# Patient Record
Sex: MALE | Race: WHITE | Employment: FULL TIME | URBAN - METROPOLITAN AREA
[De-identification: names, ages, dates, MRNs, and addresses within clinical notes are randomized per-mention and may not be internally consistent; named-entity substitution may affect disease eponyms.]

---

## 2017-02-09 ENCOUNTER — OFFICE VISIT (OUTPATIENT)
Dept: FAMILY MEDICINE | Facility: CLINIC | Age: 47
End: 2017-02-09
Payer: COMMERCIAL

## 2017-02-09 ENCOUNTER — TELEPHONE (OUTPATIENT)
Dept: FAMILY MEDICINE | Facility: CLINIC | Age: 47
End: 2017-02-09

## 2017-02-09 VITALS — DIASTOLIC BLOOD PRESSURE: 81 MMHG | HEART RATE: 83 BPM | SYSTOLIC BLOOD PRESSURE: 137 MMHG

## 2017-02-09 DIAGNOSIS — F41.1 GENERALIZED ANXIETY DISORDER: ICD-10-CM

## 2017-02-09 DIAGNOSIS — M62.831 CHARLEYHORSE: Primary | ICD-10-CM

## 2017-02-09 DIAGNOSIS — R79.89 ELEVATED FERRITIN: ICD-10-CM

## 2017-02-09 DIAGNOSIS — G25.81 RESTLESS LEG SYNDROME: ICD-10-CM

## 2017-02-09 DIAGNOSIS — Z78.9 HISTORY OF CLENCHING OF MANDIBLE: ICD-10-CM

## 2017-02-09 DIAGNOSIS — R07.89 ATYPICAL CHEST PAIN: ICD-10-CM

## 2017-02-09 LAB
ANION GAP SERPL CALCULATED.3IONS-SCNC: 9 MMOL/L (ref 3–14)
BUN SERPL-MCNC: 17 MG/DL (ref 7–30)
CALCIUM SERPL-MCNC: 8.7 MG/DL (ref 8.5–10.1)
CHLORIDE SERPL-SCNC: 105 MMOL/L (ref 94–109)
CO2 SERPL-SCNC: 23 MMOL/L (ref 20–32)
CREAT SERPL-MCNC: 0.74 MG/DL (ref 0.66–1.25)
FERRITIN SERPL-MCNC: 664 NG/ML (ref 26–388)
GFR SERPL CREATININE-BSD FRML MDRD: ABNORMAL ML/MIN/1.7M2
GLUCOSE SERPL-MCNC: 123 MG/DL (ref 70–99)
MAGNESIUM SERPL-MCNC: 2.2 MG/DL (ref 1.6–2.3)
POTASSIUM SERPL-SCNC: 3.9 MMOL/L (ref 3.4–5.3)
SODIUM SERPL-SCNC: 137 MMOL/L (ref 133–144)

## 2017-02-09 PROCEDURE — 80048 BASIC METABOLIC PNL TOTAL CA: CPT | Performed by: FAMILY MEDICINE

## 2017-02-09 PROCEDURE — 83550 IRON BINDING TEST: CPT | Performed by: FAMILY MEDICINE

## 2017-02-09 PROCEDURE — 36415 COLL VENOUS BLD VENIPUNCTURE: CPT | Performed by: FAMILY MEDICINE

## 2017-02-09 PROCEDURE — 83540 ASSAY OF IRON: CPT | Performed by: FAMILY MEDICINE

## 2017-02-09 PROCEDURE — 83735 ASSAY OF MAGNESIUM: CPT | Performed by: FAMILY MEDICINE

## 2017-02-09 PROCEDURE — 82728 ASSAY OF FERRITIN: CPT | Performed by: FAMILY MEDICINE

## 2017-02-09 PROCEDURE — 99214 OFFICE O/P EST MOD 30 MIN: CPT | Performed by: FAMILY MEDICINE

## 2017-02-09 RX ORDER — CLONAZEPAM 1 MG/1
TABLET ORAL
Qty: 60 TABLET | Refills: 0 | Status: SHIPPED | OUTPATIENT
Start: 2017-02-09 | End: 2017-06-02

## 2017-02-09 ASSESSMENT — ANXIETY QUESTIONNAIRES
7. FEELING AFRAID AS IF SOMETHING AWFUL MIGHT HAPPEN: SEVERAL DAYS
3. WORRYING TOO MUCH ABOUT DIFFERENT THINGS: NOT AT ALL
GAD7 TOTAL SCORE: 8
2. NOT BEING ABLE TO STOP OR CONTROL WORRYING: SEVERAL DAYS
1. FEELING NERVOUS, ANXIOUS, OR ON EDGE: SEVERAL DAYS
5. BEING SO RESTLESS THAT IT IS HARD TO SIT STILL: MORE THAN HALF THE DAYS
IF YOU CHECKED OFF ANY PROBLEMS ON THIS QUESTIONNAIRE, HOW DIFFICULT HAVE THESE PROBLEMS MADE IT FOR YOU TO DO YOUR WORK, TAKE CARE OF THINGS AT HOME, OR GET ALONG WITH OTHER PEOPLE: SOMEWHAT DIFFICULT
6. BECOMING EASILY ANNOYED OR IRRITABLE: SEVERAL DAYS

## 2017-02-09 ASSESSMENT — PATIENT HEALTH QUESTIONNAIRE - PHQ9: 5. POOR APPETITE OR OVEREATING: MORE THAN HALF THE DAYS

## 2017-02-09 NOTE — NURSING NOTE
"Chief Complaint   Patient presents with     Chest Pain     chest pain and SOB ongoing for a couple weeks. has been seen twice for this. He also states he has tingling and burning in hands and weakness/fatigue over entire body with occasional lightheadedness upon standing.        Initial /81 mmHg  Pulse 83 Estimated body mass index is 36.39 kg/(m^2) as calculated from the following:    Height as of 11/18/16: 5' 11\" (1.803 m).    Weight as of 12/30/16: 260 lb 12.8 oz (118.298 kg).  Medication Reconciliation: complete   Nadya Campos CMA    "

## 2017-02-09 NOTE — MR AVS SNAPSHOT
"              After Visit Summary   2/9/2017    Phi Ortiz Jr.    MRN: [id number]           Patient Information     Date Of Birth          1970        Visit Information        Provider Department      2/9/2017 2:00 PM Cayla Betancourt MD [facility] [place]        Today's Diagnoses     [place]    -  1     Restless leg syndrome         History of clenching of mandible         Atypical chest pain         Generalized anxiety disorder           Care Instructions    Try the clonazepam 1/2 to 1 tablet  (o.5 to 1 mg) at night for a week or ten days to see if this helps your restless legs and cramps at night. If it does, then you may add 1/2 to 1 tablet in the AM to reduce the jaw clenching. Recheck with your regular nurse practitioner or physician in a month or so.        Follow-ups after your visit        Who to contact     If you have questions or need follow up information about today's clinic visit or your schedule please contact [facility] directly at [phone].  Normal or non-critical lab and imaging results will be communicated to you by [employer], letter or phone within 4 business days after the clinic has received the results. If you do not hear from us within 7 days, please contact the clinic through [employer] or phone. If you have a critical or abnormal lab result, we will notify you by phone as soon as possible.  Submit refill requests through [employer] or call your pharmacy and they will forward the refill request to us. Please allow 3 business days for your refill to be completed.          Additional Information About Your Visit        [employer] Information     Hub[employer] lets you send messages to your doctor, view your test results, renew your prescriptions, schedule appointments and more. To sign up, go to [email or web address] . Click on \"Log in\" on the left side of the screen, which will take you to the Welcome page. Then click on \"Sign up Now\" on the right side " of the page.     You will be asked to enter the access code listed below, as well as some personal information. Please follow the directions to create your username and password.     Your access code is: [email or web address]  Expires: 2017  1:14 PM     Your access code will  in 90 days. If you need help or a new code, please call your [place] clinic or [phone].        Care EveryWhere ID     This is your Care EveryWhere ID. This could be used by other organizations to access your [place] medical records  [id number]        Your Vitals Were     Pulse                   83            Blood Pressure from Last 3 Encounters:   17 137/81   16 124/84   16 125/73    Weight from Last 3 Encounters:   16 260 lb 12.8 oz (118.298 kg)   16 261 lb (118.389 kg)   10/06/16 259 lb (117.482 kg)              We Performed the Following     Basic metabolic panel     Ferritin     Magnesium          Today's Medication Changes          These changes are accurate as of: 17  3:08 PM.  If you have any questions, ask your nurse or doctor.               Start taking these medicines.        Dose/Directions    clonazePAM 1 MG tablet   Commonly known as:  klonoPIN   Used for:  Restless leg syndrome, History of clenching of mandible, Generalized anxiety disorder   Started by:  Cayla Betancourt MD        Take 1/2 - 1 tablet at bedtime.  If cramps and restless legs improve at night, may increase to BID dosing prn.   Quantity:  60 tablet   Refills:  0            Where to get your medicines      Some of these will need a paper prescription and others can be bought over the counter.  Ask your nurse if you have questions.     Bring a paper prescription for each of these medications    - clonazePAM 1 MG tablet             Primary Care Provider Office Phone # Fax #    Genet Ortiz [phone]       [facility] [address] [facility] [address]        Thank you!     Thank  you for choosing [facility]  for your care. Our goal is always to provide you with excellent care. Hearing back from our patients is one way we can continue to improve our services. Please take a few minutes to complete the written survey that you may receive in the mail after your visit with us. Thank you!             Your Updated Medication List - Protect others around you: Learn how to safely use, store and throw away your medicines at www.disposemymeds.org.          This list is accurate as of: 2/9/17  3:08 PM.  Always use your most recent med list.                   Brand Name Dispense Instructions for use    albuterol 108 (90 BASE) MCG/ACT Inhaler    PROAIR HFA/PROVENTIL HFA/VENTOLIN HFA    1 Inhaler    Inhale 2 puffs into the lungs every 6 hours as needed for shortness of breath / dyspnea or wheezing       aspirin 81 MG EC tablet     90 tablet    Take 325 mg by mouth daily       blood glucose lancing device     30 each    Use to test blood sugars 1 times daily or as directed.       blood glucose monitoring meter device kit     1 kit    Use to test blood sugars 1 times daily or as directed.       blood glucose monitoring test strip    ONE TOUCH ULTRA    1 Box    Use to test blood sugars 1 times daily or as directed.       citalopram 40 MG tablet    celeXA    30 tablet    Take 1 tablet (40 mg) by mouth daily       clonazePAM 1 MG tablet    klonoPIN    60 tablet    Take 1/2 - 1 tablet at bedtime.  If cramps and restless legs improve at night, may increase to BID dosing prn.       lisinopril 2.5 MG tablet    PRINIVIL/Zestril    90 tablet    Take 1 tablet (2.5 mg) by mouth daily       metFORMIN 500 MG 24 hr tablet    GLUCOPHAGE-XR    180 tablet    Take 1 tablet (500 mg) by mouth 2 times daily (with meals)       omeprazole 40 MG capsule    priLOSEC    90 capsule    Take 1 capsule (40 mg) by mouth daily Take 30-60 minutes before a meal.       order for PATRICIA Yip was set up on [employer]  [employer]  Type REMstar Auto (System One 60 series)  Serial Number: [id number] Modem Number: [id number] Pressure: AUTO-TITRATE CPAP 7-12 cm H20 Mask of choice: AIRFIT F 10 FULL FACE MASK Size: MEDIUM HEATED TUBING AND MODEM PROVIDED       rosuvastatin 40 MG tablet    CRESTOR    90 tablet    Take 1 tablet (40 mg) by mouth daily

## 2017-02-09 NOTE — PROGRESS NOTES
SUBJECTIVE:                                                    Phi Ortiz Jr. is a 46 year old male who presents to clinic today for the following health issues:    Chief Complaint   Patient presents with     Chest Pain     chest pain and SOB ongoing for a couple weeks. has been seen twice for this and inhaler hasnt helped. He also states he has tingling and burning in hands, weakness/fatigue over entire body with occasional lightheadedness upon standing. He also added that he has been grinding his teeth and clenching his jaw lately.     Phi Ortiz Jr. is a 46 year old male with a history of RITA, mild depression, diabetes, GERD, hypertension, obstructive sleep apnea (now on CPAP), and non-obstructive coronary artery disease.  He has had intermittent episodes or periods of atypical chest pains, was last seen for this in the ED with a negative evaluation.  He does see a cardiologist, last had an angiogram in 2014. He presents now with complaints for two weeks of some focal chest discomfort which he can point to, over his lower sternum, left upper pectoral region, and left posterior shoulder. THis has not affected normal daily activitites.  He sometimes feels short of breath, but was seen somewhere else for this and given an inhaler which didn't help.   Additional complaints include periods of tingling in his hands. Once when he was driving, this sensation was accompanied by a burning feeling.  He notes Charley horses in his thighs and calves from time to time, mostly at night. He notes a need to move his legs, and even if sitting for a long period, will start to tap his feet or shuffle them.  He has a history of snoring, which apparently is still an issue despite his use of CPAP. He works a swing shift, which is difficult for him, but feels he gets eight hours of good quality sleep a night..  He notes that he has been clenching his jaws more recently, but denies dental issues. He is also not clear  whether this is happening more during the day or in sleep.    He does not think he is more anxious than previously, but admits to being more crabby.  There have been some changes in his life. His daughter had been living with her  significant other in [place], but the couple is now moving to [place], and she was just recently home for a visit.  His 18 year old son is graduating this year and will be attending college at [employer]. He is concerned about his son, who he feels has a more significant anxiety disorder than the patient.    Current Outpatient Prescriptions   Medication Sig     metFORMIN (GLUCOPHAGE-XR) 500 MG 24 hr tablet Take 1 tablet (500 mg) by mouth 2 times daily (with meals)     citalopram (CELEXA) 40 MG tablet Take 1 tablet (40 mg) by mouth daily     rosuvastatin (CRESTOR) 40 MG tablet Take 1 tablet (40 mg) by mouth daily     blood glucose monitoring (ONE TOUCH ULTRA 2) meter device kit Use to test blood sugars 1 times daily or as directed.     blood glucose (ONE TOUCH DELICA) lancing device Use to test blood sugars 1 times daily or as directed.     blood glucose monitoring (ONE TOUCH ULTRA) test strip Use to test blood sugars 1 times daily or as directed.     lisinopril (PRINIVIL,ZESTRIL) 2.5 MG tablet Take 1 tablet (2.5 mg) by mouth daily     ORDER FOR DME, SET TO Phi BYERS was set up on [employer]  Type REMstar Auto (System One 60 series)  Serial Number: [id number] Modem Number: [id number] Pressure: AUTO-TITRATE CPAP 7-12 cm H20 Mask of choice: AIRFIT F 10 FULL FACE MASK Size: MEDIUM HEATED TUBING AND MODEM PROVIDED     aspirin 81 MG EC tablet Take 4 tablets (325 mg) by mouth daily     omeprazole (PRILOSEC) 40 MG capsule Take 1 capsule (40 mg) by mouth daily Take 30-60 minutes before a meal.     albuterol (PROAIR HFA, PROVENTIL HFA, VENTOLIN HFA) 108 (90 BASE) MCG/ACT inhaler Inhale 2 puffs into the lungs every 6 hours as needed for shortness of breath / dyspnea or  wheezing           OBJECTIVE: /81 mmHg  Pulse 83  Recent recorded weigh: 260#    This is an obese pleasant gentleman in no apparent distress.  Ears are clear; TMs show no fluid or erythema.  Nose is unremarkable.  Throat is clear.  Neck is without adenopathy.  There is no tenderness over the TMJs. Dentition appears grossly normal.  The lungs are clear without wheezes, rales, or rhonchi.  Heart sounds are regular without murmur.  The abdomen is benign.  There is no edema.    Recent labs included the following:    Component      Latest Ref Rng 10/6/2016   Sodium      133 - 144 mmol/L 138   Potassium      3.4 - 5.3 mmol/L 3.9   Chloride      94 - 109 mmol/L 105   Carbon Dioxide      20 - 32 mmol/L 24   Anion Gap      3 - 14 mmol/L 9   Glucose      70 - 99 mg/dL 124 (H)   Urea Nitrogen      7 - 30 mg/dL 11   Creatinine      0.66 - 1.25 mg/dL 0.62 (L)   GFR Estimate      >60 mL/min/1.7m2 >90 . . .   GFR Estimate If Black      >60 mL/min/1.7m2 >90 . . .   Calcium      8.5 - 10.1 mg/dL 8.7   Hemoglobin A1C      4.3 - 6.0 % 6.1 (H)   Vitamin B12      193 - 986 pg/mL 860   TSH      0.40 - 4.00 mU/L 1.17       RITA-7   [employer], 2002; Used with Permission) 10/6/2016   RITA-7 Total Score =     1. Feeling nervous, anxious, or on edge 2   2. Not being able to stop or control worrying 3   3. Worrying too much about different things 3   4. Trouble relaxing 3   5. Being so restless that it is hard to sit still 3   6. Becoming easily annoyed or irritable 3   7. Feeling afraid, as if something awful might happen 1   RITA-7 Total Score 18   If you checked any problems, how difficult have they made it for you to do your work, take care of things at home, or get along with other people?      RITA-7   [employer], 2002; Used with Permission) 2/9/2017   RITA-7 Total Score =     1. Feeling nervous, anxious, or on edge 1   2. Not being able to stop or control worrying 1   3. Worrying too much about different things 0   4. Trouble  relaxing 2   5. Being so restless that it is hard to sit still 2   6. Becoming easily annoyed or irritable 1   7. Feeling afraid, as if something awful might happen 1   RITA-7 Total Score 8   If you checked any problems, how difficult have they made it for you to do your work, take care of things at home, or get along with other people? Somewhat difficult     ASSESSMENT:    1) atypical chest pain with jaw clenching and teeth grinding -- in the absence of other apparent etiologies, I suspect this is a reflection of his underlying anxiety, despite the fact that he is giving himself lower scores on his RITA-7 than previously. He is already on a maximum dose of citalopram.  2)lower extremity muscle cramps and probably restless legs    PLAN: We will check a BMP, magnesium level, and ferritin, as low iron could contribute to restless legs.   Rx clonazepam to assist both with anxiety and muscle tension and restless legs.  Start with 0.5 - 1 mg at bedtime for a week or so, and if nighttime symptoms improve, then may add 0.5-1 mg in the AM to help with jaw clenching.   He is normally followed at [facility] and will recheck with his PCP regarding whether the Klonopin is of benefit.    Cayla Betancourt md

## 2017-02-09 NOTE — PATIENT INSTRUCTIONS
Try the clonazepam 1/2 to 1 tablet  (o.5 to 1 mg) at night for a week or ten days to see if this helps your restless legs and cramps at night. If it does, then you may add 1/2 to 1 tablet in the AM to reduce the jaw clenching. Recheck with your regular nurse practitioner or physician in a month or so.

## 2017-02-09 NOTE — Clinical Note
[facility]  [address] [facility] [address]  Phone: [phone]    February 10, 2017    Phi Ortiz Jr.  [address] [facility] [address]          Dear Phi,    The results of your recent lab tests were The ferritin, which measures stored iron, was high, but your iron levels are normal. Ferritin can also be increased by obesity and diabetes, and the primary reason we had checked this was to make sure he was not too low on iron, since that could account for some of your restless legs. But the iron is fine.  Magnesium and all the other electrolytes and kidney functions are fine too. Enclosed is a copy of these results.  If you have any further questions or problems, please contact our office.  Results for orders placed or performed in visit on 02/09/17   Ferritin   Result Value Ref Range    Ferritin 664 (H) 26 - 388 ng/mL   Magnesium   Result Value Ref Range    Magnesium 2.2 1.6 - 2.3 mg/dL   Basic metabolic panel   Result Value Ref Range    Sodium 137 133 - 144 mmol/L    Potassium 3.9 3.4 - 5.3 mmol/L    Chloride 105 94 - 109 mmol/L    Carbon Dioxide 23 20 - 32 mmol/L    Anion Gap 9 3 - 14 mmol/L    Glucose 123 (H) 70 - 99 mg/dL    Urea Nitrogen 17 7 - 30 mg/dL    Creatinine 0.74 0.66 - 1.25 mg/dL    GFR Estimate >90  Non  GFR Calc   >60 mL/min/1.7m2    GFR Estimate If Black >90   GFR Calc   >60 mL/min/1.7m2    Calcium 8.7 8.5 - 10.1 mg/dL   Iron and iron binding capacity   Result Value Ref Range    Iron 95 35 - 180 ug/dL    Iron Binding Cap 299 240 - 430 ug/dL    Iron Saturation Index 32 15 - 46 %     Sincerely,      Cayla Betancourt MD/ llc

## 2017-02-09 NOTE — TELEPHONE ENCOUNTER
Dr. Betancourt is seeing a pt at 2pm today that normally sees Rad[facility]. It is for chest pain with sob, and weakness. I think they wanted him to talk to the care team but he ended up being transferred to [facility] . Can someone call him but he is at work.

## 2017-02-09 NOTE — TELEPHONE ENCOUNTER
Spoke with pt who reports onset recurrent irritating pains lt collar bone area 2 wks ago.   States pain/ irritation fairly constant 3/10 with periodic brief sharp pains.   Pain worsened with any activity involving arm muscle use.  Also reports noting some mild exertional SOA, fatigue.  Denies fevers, chills.   In addition, reports burning palms of hands 2 wks ago, no sx currently.  Reports current URI/ sinus sx so not sure if has anything to do with how he is feeling.   Has seen card in past 04-26-16-     ASSESSMENT AND PLAN:    Mr Ortiz is a 46 yo male with non-obstructive coronary artery disease, hypercholesterolemia, strong family history of premature CAD and obesity who is here for a follow up visit.  --LDL was 86, will increase crestor from 20 mg daily to 40 mg daily  --Continue ASA 81 mg daily.    --Given non-obstructive CAD and his risk profile we'll continue with risk factor modification which includes weight loss, regular exercise, low salt, low fat, low caloric diet with portion control. Applauded on 25 pound weight loss over the last one year.      Hx similar sx in past., eval in ED 08-17-16-      Assessments & Plan (with Medical Decision Making) labs EKG and chest x-ray were obtained.  EKG was within normal limits.  Unchanged from previous.  CBC and basic metabolic panel within normal limits.  BNP was not elevated.  Initial troponin was negative.  Patient had been initially given aspirin and later was given Toradol with significant improvement of his pain.  Findings were discussed with patient.  He is having minimal pain at this time.  It is only present with movement.  A repeat troponin was obtained roughly 3 hours from previous and was also negative.  He was fed and ambulated and denied any pain at this time.  I discussed that with his significant risk factors I felt admission for continued rule out and possible stress testing would be the safest thing for the patient.  Patient understands his risks  but does not think this is cardiac in nature he feels more comfortable following up with his cardiologist in the morning.  He is strongly advised to return if any return of chest pain.  He understands I cannot completely rule out a cardiac nature to this chest pain.  I considered numerous causes of chest pain including: MI, ACS, pneumonia, pneumothorax, pulmonary embolism, pericarditis, pleurisy, myocarditis, musculoskeletal, GERD, PUD etc..             Due to nature of sx, hx similar sx advised ED however pt insists sx are not cardiac related and will keep appt with Dr. Betancourt today.   KAYLEIGH Elizabeth RN

## 2017-02-10 LAB
IRON SATN MFR SERPL: 32 % (ref 15–46)
IRON SERPL-MCNC: 95 UG/DL (ref 35–180)
TIBC SERPL-MCNC: 299 UG/DL (ref 240–430)

## 2017-02-10 ASSESSMENT — ANXIETY QUESTIONNAIRES: GAD7 TOTAL SCORE: 8

## 2017-02-13 ENCOUNTER — TELEPHONE (OUTPATIENT)
Dept: FAMILY MEDICINE | Facility: CLINIC | Age: 47
End: 2017-02-13

## 2017-02-13 NOTE — TELEPHONE ENCOUNTER
Reason for Call:  Other call back    Detailed comments: Patient states he received a box of catheters and has no idea what to do with them.    Phone Number Patient can be reached at: Home number on file [phone] (home)    Best Time: any    Can we leave a detailed message on this number? YES    Call taken on 2/13/2017 at 8:57 AM by Cayla Green

## 2017-02-14 NOTE — TELEPHONE ENCOUNTER
I spoke with pt.    He clarifies that he received a box of catheters in the mail from [place].  Pt does not know why he received these.  There was no packing slip with the package.    Pt will bring the box in and meet with the RN at his next opportunity for assistance to determine if this is something he is supposed to be using vs if the package was received by error.  Thi Venegas, RN

## 2017-03-16 ENCOUNTER — OFFICE VISIT (OUTPATIENT)
Dept: FAMILY MEDICINE | Facility: CLINIC | Age: 47
End: 2017-03-16
Payer: COMMERCIAL

## 2017-03-16 VITALS
HEIGHT: 71 IN | OXYGEN SATURATION: 96 % | WEIGHT: 266 LBS | HEART RATE: 79 BPM | DIASTOLIC BLOOD PRESSURE: 76 MMHG | BODY MASS INDEX: 37.24 KG/M2 | RESPIRATION RATE: 20 BRPM | SYSTOLIC BLOOD PRESSURE: 128 MMHG | TEMPERATURE: 98.7 F

## 2017-03-16 DIAGNOSIS — K21.9 GASTROESOPHAGEAL REFLUX DISEASE WITHOUT ESOPHAGITIS: Primary | ICD-10-CM

## 2017-03-16 DIAGNOSIS — R10.13 EPIGASTRIC PAIN: ICD-10-CM

## 2017-03-16 PROCEDURE — 99214 OFFICE O/P EST MOD 30 MIN: CPT | Performed by: NURSE PRACTITIONER

## 2017-03-16 NOTE — PATIENT INSTRUCTIONS
[place] diet, rest, fluids as tolerated.     Use Medication as directed    Follow up with PCP in 2 weeks.    Go to Emergency Room if sx worsen or change.   May use acetaminophen, ibuprofen prn.    Patient voiced understanding of instructions given.        Follow-up with your primary care provider next week and as needed.    Indications for emergent return to emergency department discussed with patient, who verbalized good understanding and agreement.  Patient understands the limitations of today's evaluation.         Medications for Acid Reflux  Your health care provider has told you that you have acid reflux. This is a condition that causes stomach acid to wash up into your throat. For most people, acid reflux is troubling but not dangerous. However, left untreated, acid reflux sometimes damages the esophagus. Medications can help control acid reflux and limit your risk of future problems.  Medications for Acid Reflux  Your health care provider may prescribe medication to help treat your acid reflux. Medication will be based on your symptoms and the results of any tests. Your health care provider will explain how to take your medication. You will also be told about possible side effects.  Reducing Stomach Acid  Your doctor may suggest antacids that you can buy over the counter. Antacids can give fast relief. Or you may be told to take a type of medication called H2 blockers. These are available over the counter and by prescription (for higher doses).  Blocking Stomach Acid  In more severe cases, your doctor may suggest stronger medications such as proton pump inhibitors (PPIs). These keep the stomach from making acid. They are often prescribed for long-term use.  Other Medications  If medications to reduce or block stomach acid don t work, you may be switched to another type of medication that helps the stomach empty better.    0892-5708 [employer]. [address] [place] [address], [place] [address]. All  rights reserved. This information is not intended as a substitute for professional medical care. Always follow your healthcare professional's instructions.        Tips to Control Acid Reflux  To control acid reflux, you ll need to make some basic diet and lifestyle changes. The simple steps outlined below may be all you ll need to relieve discomfort.  Watch What You Eat      Avoid fatty foods and spicy foods.    Eat fewer acidic foods, such as citrus and tomato-based foods. These can increase symptoms.    Limit drinking alcohol, caffeine, and fizzy beverages. All increase acid reflux.    Try limiting chocolate, peppermint, and spearmint. These can worsen acid reflux in some people.  Watch When You Eat    Avoid lying down for 3 hours after eating.    Do not snack before going to bed.  Raise Your Head    Raising your head and upper body by 4 inches to 6 inches helps limit reflux when you re lying down. Put blocks under the head of the bed frame to raise it.  Other Changes    Lose weight, if you need to.    Don t work out near bedtime.    Avoid tight-fitting clothes.    Limit aspirin and ibuprofen.    Stop smoking.     5998-8407 [employer]. [address] [place] [address]. All rights reserved. This information is not intended as a substitute for professional medical care. Always follow your healthcare professional's instructions.        GERD (Adult)    The esophagus is a tube that carries food from the mouth to the stomach. A valve at the lower end of the esophagus prevents stomach acid from flowing upward. When this valve doesn't work properly, stomach contents may repeatedly flow back up (reflux) into the esophagus. This is called gastroesophageal reflux disease (GERD). GERD can irritate the esophagus. It can cause problems with swallowing or breathing. In severe cases, GERD can cause recurrent pneumonia or other serious problems.  Symptoms of reflux include burning, pressure or sharp pain in  "the upper abdomen or mid to lower chest. The pain can spread to the neck, back, or shoulder. There may be belching, an acid taste in the back of the throat, chronic cough, or sore throat or hoarseness. GERD symptoms often occur during the day after a big meal. They can also occur at night when lying down.   Home care  Lifestyle changes can help reduce symptoms. If needed, medicines may be prescribed. Symptoms often improve with treatment, but if treatment is stopped, the symptoms often return after a few months. So most persons with GERD will need to continue treatment.  Lifestyle changes    Limit or avoid fatty, fried, and spicy foods, as well as coffee, chocolate, mint, and foods with high acid content such as tomatoes and citrus fruit and juices (orange, grapefruit, lemon).    Don t eat large meals, especially at night. Frequent, smaller meals are best. Do not lie down right after eating. And don t eat anything 3 hours before going to bed.    Avoid drinking alcohol and smoking. As much as possible, stay away from second hand smoke.    If you are overweight, losing weight will reduce symptoms.     Avoid wearing tight clothing around your stomach area.    If your symptoms occur during sleep, use a foam wedge to elevate your upper body (not just your head.) Or, place 4\" blocks under the head of your bed.  Medicines  If needed, medicines can help relieve the symptoms of GERD and prevent damage to the esophagus. Discuss a medicine plan with your healthcare provider. This may include one or more of the following medicines:    Antacids to help neutralize the normal acids in your stomach.    Acid blockers (H2 blockers) to decrease acid production.    Acid inhibitors (PPIs) to decrease acid production in a different way than the blockers. They may work better, but can take a little longer to take effect.  Take an antacid 30-60 minutes after eating and at bedtime, but not at the same time as an acid blocker.  Try not to " take medicines such as ibuprofen and aspirin. If you are taking aspirin for your heart or other medical reasons, talk to your healthcare provider about stopping it.  Follow-up care  Follow up with your healthcare provider or as advised by our staff.  When to seek medical advice  Call your healthcare provider if any of the following occur:    Stomach pain gets worse or moves to the lower right abdomen (appendix area)    Chest pain appears or gets worse, or spreads to the back, neck, shoulder, or arm    Frequent vomiting (can t keep down liquids)    Blood in the stool or vomit (red or black in color)    Feeling weak or dizzy    Fever of 100.4 F (38 C) or higher, or as directed by your healthcare provider    1869-2585 [employer]. [address] [place] [address], [place] [address]. All rights reserved. This information is not intended as a substitute for professional medical care. Always follow your healthcare professional's instructions.

## 2017-03-16 NOTE — LETTER
My Depression Action Plan  Name: Phi Ortiz Jr.   Date of Birth 1970  Date: 3/16/2017    My doctor: Genet Ortiz   My clinic: [address] [id number] [phone]          GREEN    ZONE   Good Control    What it looks like:     Things are going generally well. You have normal up s and down s. You may even feel depressed from time to time, but bad moods usually last less than a day.   What you need to do:  1. Continue to care for yourself (see self care plan)  2. Check your depression survival kit and update it as needed  3. Follow your physician s recommendations including any medication.  4. Do not stop taking medication unless you consult with your physician first.           YELLOW         ZONE Getting Worse    What it looks like:     Depression is starting to interfere with your life.     It may be hard to get out of bed; you may be starting to isolate yourself from others.    Symptoms of depression are starting to last most all day and this has happened for several days.     You may have suicidal thoughts but they are not constant.   What you need to do:     1. Call your care team, your response to treatment will improve if you keep your care team informed of your progress. Yellow periods are signs an adjustment may need to be made.     2. Continue your self-care, even if you have to fake it!    3. Talk to someone in your support network    4. Open up your depression survival kit           RED    ZONE Medical Alert - Get Help    What it looks like:     Depression is seriously interfering with your life.     You may experience these or other symptoms: You can t get out of bed most days, can t work or engage in other necessary activities, you have trouble taking care of basic hygiene, or basic responsibilities, thoughts of suicide or death that will not go away, self-injurious behavior.     What you need to do:  1. Call your care  team and request a same-day appointment. If they are not available (weekends or after hours) call your local crisis line, emergency room or 911.      Electronically signed by: Mariza Richardson, March 16, 2017    Depression Self Care Plan / Survival Kit    Self-Care for Depression  Here s the deal. Your body and mind are really not as separate as most people think.  What you do and think affects how you feel and how you feel influences what you do and think. This means if you do things that people who feel good do, it will help you feel better.  Sometimes this is all it takes.  There is also a place for medication and therapy depending on how severe your depression is, so be sure to consult with your medical provider and/ or Behavioral Health Consultant if your symptoms are worsening or not improving.     In order to better manage my stress, I will:    Exercise  Get some form of exercise, every day. This will help reduce pain and release endorphins, the  feel good  chemicals in your brain. This is almost as good as taking antidepressants!  This is not the same as joining a gym and then never going! (they count on that by the way ) It can be as simple as just going for a walk or doing some gardening, anything that will get you moving.      Hygiene   Maintain good hygiene (Get out of bed in the morning, Make your bed, Brush your teeth, Take a shower, and Get dressed like you were going to work, even if you are unemployed).  If your clothes don't fit try to get ones that do.    Diet  I will strive to eat foods that are good for me, drink plenty of water, and avoid excessive sugar, caffeine, alcohol, and other mood-altering substances.  Some foods that are helpful in depression are: complex carbohydrates, B vitamins, flaxseed, fish or fish oil, fresh fruits and vegetables.    Psychotherapy  I agree to participate in Individual Therapy (if recommended).    Medication  If prescribed medications, I agree to take them.   Missing doses can result in serious side effects.  I understand that drinking alcohol, or other illicit drug use, may cause potential side effects.  I will not stop my medication abruptly without first discussing it with my provider.    Staying Connected With Others  I will stay in touch with my friends, family members, and my primary care provider/team.    Use your imagination  Be creative.  We all have a creative side; it doesn t matter if it s oil painting, sand castles, or mud pies! This will also kick up the endorphins.    Witness Beauty  (AKA stop and smell the roses) Take a look outside, even in mid-winter. Notice colors, textures. Watch the squirrels and birds.     Service to others  Be of service to others.  There is always someone else in need.  By helping others we can  get out of ourselves  and remember the really important things.  This also provides opportunities for practicing all the other parts of the program.    Humor  Laugh and be silly!  Adjust your TV habits for less news and crime-drama and more comedy.    Control your stress  Try breathing deep, massage therapy, biofeedback, and meditation. Find time to relax each day.     My support system    Clinic Contact:  Phone number:    Contact 1:  Phone number:    Contact 2:  Phone number:    Worship/:  Phone number:    Therapist:  Phone number:    Local crisis center:    Phone number:    Other community support:  Phone number:

## 2017-03-16 NOTE — NURSING NOTE
"Chief Complaint   Patient presents with     Gastric Problem     heart burn       Initial /76  Pulse 79  Temp 98.7  F (37.1  C) (Tympanic)  Resp 20  Ht 5' 11\" (1.803 m)  Wt 266 lb (120.7 kg)  SpO2 96%  BMI 37.1 kg/m2 Estimated body mass index is 37.1 kg/(m^2) as calculated from the following:    Height as of this encounter: 5' 11\" (1.803 m).    Weight as of this encounter: 266 lb (120.7 kg).  Medication Reconciliation: complete     Encouraged microalbumin and lipid screen. Did dap today!  "

## 2017-03-16 NOTE — PROGRESS NOTES
SUBJECTIVE:                                                    Phi Ortiz Jr. is a 46 year old male who presents to clinic today for the following health issues:      GERD/Heartburn      Duration: today    Description (location/character/radiation): substernal, up into neck behind L ear    Intensity:  severe    Accompanying signs and symptoms:  food getting stuck: no   nausea/vomiting/blood: no   abdominal pain: no   black/tarry or bloody stools: no :    History (similar episodes/previous evaluation): None    Precipitating or alleviating factors none.  current NSAID/Aspirin use: YES    Therapies tried and outcome: Omeprazole (Prilosec)- currently taking omeprazole- has eaten bland diet today    Denies chest pain shortness of breath or other concerns      Problem list and histories reviewed & adjusted, as indicated.  Additional history: as documented    Patient Active Problem List   Diagnosis     GERD (gastroesophageal reflux disease)     Generalized anxiety disorder     Fatty liver     Family history of coronary artery disease     Hypertriglyceridemia     Vitamin D deficiency     Chest pain     Chronic ischemic heart disease     Hyperlipidemia LDL goal <70     Type 2 diabetes mellitus without complications (H)     Mild major depression (H)     overweight BMI greater than 35     Essential hypertension, benign     Past Surgical History   Procedure Laterality Date     No history of surgery         Social History   Substance Use Topics     Smoking status: Never Smoker     Smokeless tobacco: Former User     Alcohol use Yes      Comment: 6 pack a month     Family History   Problem Relation Age of Onset     C.A.D. Father      heart attack 30's     CEREBROVASCULAR DISEASE Father      DIABETES Father      Hypertension Father      Hypertension Mother      GASTROINTESTINAL DISEASE Mother      colitis     DIABETES Maternal Grandmother      snores     C.A.D. Maternal Grandfather      MI, leg circulation problems      WILLIAMASTEFANIA. Paternal Grandfather      snores     Neurologic Disorder Sister      vertigo.  fibromyalgia     Hyperlipidemia Daughter      Breast Cancer No family hx of      Cancer - colorectal No family hx of      Prostate Cancer No family hx of          Current Outpatient Prescriptions   Medication Sig Dispense Refill     aspirin 81 MG EC tablet Take 4 tablets (325 mg) by mouth daily 90 tablet 3     omeprazole (PRILOSEC) 40 MG capsule Take 1 capsule (40 mg) by mouth daily Take 30-60 minutes before a meal. 90 capsule 2     clonazePAM (KLONOPIN) 1 MG tablet Take 1/2 - 1 tablet at bedtime.  If cramps and restless legs improve at night, may increase to BID dosing prn. 60 tablet 0     metFORMIN (GLUCOPHAGE-XR) 500 MG 24 hr tablet Take 1 tablet (500 mg) by mouth 2 times daily (with meals) 180 tablet 0     citalopram (CELEXA) 40 MG tablet Take 1 tablet (40 mg) by mouth daily 30 tablet 3     albuterol (PROAIR HFA, PROVENTIL HFA, VENTOLIN HFA) 108 (90 BASE) MCG/ACT inhaler Inhale 2 puffs into the lungs every 6 hours as needed for shortness of breath / dyspnea or wheezing 1 Inhaler 0     rosuvastatin (CRESTOR) 40 MG tablet Take 1 tablet (40 mg) by mouth daily 90 tablet 3     blood glucose monitoring (ONE TOUCH ULTRA 2) meter device kit Use to test blood sugars 1 times daily or as directed. 1 kit 0     blood glucose (ONE TOUCH DELICA) lancing device Use to test blood sugars 1 times daily or as directed. 30 each 6     blood glucose monitoring (ONE TOUCH ULTRA) test strip Use to test blood sugars 1 times daily or as directed. 1 Box 6     lisinopril (PRINIVIL,ZESTRIL) 2.5 MG tablet Take 1 tablet (2.5 mg) by mouth daily 90 tablet 1     ORDER FOR PATRICIA, SET TO Phi BYERS was set up on V-Key  Type [employer] Auto (System One 60 series)  Serial Number: [id number] Modem Number: [id number] Pressure: AUTO-TITRATE CPAP 7-12 cm H20 Mask of choice: AIRFIT F 10 FULL FACE MASK Size: MEDIUM HEATED TUBING AND MODEM PROVIDED       No  "Known Allergies    Reviewed and updated as needed this visit by clinical staff  Tobacco  Allergies  Med Hx  Surg Hx  Fam Hx  Soc Hx      Reviewed and updated as needed this visit by Provider         ROS:  Constitutional, HEENT, cardiovascular, pulmonary, GI, , musculoskeletal, neuro, skin, endocrine and psych systems are negative, except as otherwise noted.    OBJECTIVE:                                                    /76  Pulse 79  Temp 98.7  F (37.1  C) (Tympanic)  Resp 20  Ht 5' 11\" (1.803 m)  Wt 266 lb (120.7 kg)  SpO2 96%  BMI 37.1 kg/m2  Body mass index is 37.1 kg/(m^2).   GENERAL: healthy, alert and no distress  EYES: Eyes grossly normal to inspection, PERRL and conjunctivae and sclerae normal  HENT: ear canals and TM's normal, nose and mouth without ulcers or lesions  NECK: no adenopathy, no asymmetry, masses, or scars and thyroid normal to palpation  RESP: lungs clear to auscultation - no rales, rhonchi or wheezes  CV: regular rate and rhythm, normal S1 S2, no S3 or S4, no murmur, click or rub, no peripheral edema and peripheral pulses strong  Abdomen: No rebound tenderness, Markle sign, Rovsing sign and obturator and psoas sign negative   Positive for epigastric tenderness.  MS: no gross musculoskeletal defects noted, no edema  SKIN: no suspicious lesions or rashes  NEURO: Normal strength and tone, mentation intact and speech normal  PSYCH: mentation appears normal, affect normal/bright         ASSESSMENT:                                                        ICD-10-CM    1. Gastroesophageal reflux disease without esophagitis K21.9 ranitidine (ZANTAC) 15 MG/ML syrup   2. Epigastric pain R10.13          PLAN:                                                    Patient given a GI coccktail with relief of his GERD  Will have him start on zantac one table at night in addition to his Prilosec.     Patient Instructions       Saline diet, rest, fluids as tolerated.     Use Medication as " directed    Follow up with PCP in 2 weeks.    Go to Emergency Room if sx worsen or change.   May use acetaminophen, ibuprofen prn.    Patient voiced understanding of instructions given.        Follow-up with your primary care provider next week and as needed.    Indications for emergent return to emergency department discussed with patient, who verbalized good understanding and agreement.  Patient understands the limitations of today's evaluation.         Medications for Acid Reflux  Your health care provider has told you that you have acid reflux. This is a condition that causes stomach acid to wash up into your throat. For most people, acid reflux is troubling but not dangerous. However, left untreated, acid reflux sometimes damages the esophagus. Medications can help control acid reflux and limit your risk of future problems.  Medications for Acid Reflux  Your health care provider may prescribe medication to help treat your acid reflux. Medication will be based on your symptoms and the results of any tests. Your health care provider will explain how to take your medication. You will also be told about possible side effects.  Reducing Stomach Acid  Your doctor may suggest antacids that you can buy over the counter. Antacids can give fast relief. Or you may be told to take a type of medication called H2 blockers. These are available over the counter and by prescription (for higher doses).  Blocking Stomach Acid  In more severe cases, your doctor may suggest stronger medications such as proton pump inhibitors (PPIs). These keep the stomach from making acid. They are often prescribed for long-term use.  Other Medications  If medications to reduce or block stomach acid don t work, you may be switched to another type of medication that helps the stomach empty better.    0156-6464 [employer]. [address] [place] [address], [place] [address]. All rights reserved. This information is not intended as a substitute  for professional medical care. Always follow your healthcare professional's instructions.        Tips to Control Acid Reflux  To control acid reflux, you ll need to make some basic diet and lifestyle changes. The simple steps outlined below may be all you ll need to relieve discomfort.  Watch What You Eat      Avoid fatty foods and spicy foods.    Eat fewer acidic foods, such as citrus and tomato-based foods. These can increase symptoms.    Limit drinking alcohol, caffeine, and fizzy beverages. All increase acid reflux.    Try limiting chocolate, peppermint, and spearmint. These can worsen acid reflux in some people.  Watch When You Eat    Avoid lying down for 3 hours after eating.    Do not snack before going to bed.  Raise Your Head    Raising your head and upper body by 4 inches to 6 inches helps limit reflux when you re lying down. Put blocks under the head of the bed frame to raise it.  Other Changes    Lose weight, if you need to.    Don t work out near bedtime.    Avoid tight-fitting clothes.    Limit aspirin and ibuprofen.    Stop smoking.     0761-4311 [employer]. [address] [place] [address]. All rights reserved. This information is not intended as a substitute for professional medical care. Always follow your healthcare professional's instructions.        GERD (Adult)    The esophagus is a tube that carries food from the mouth to the stomach. A valve at the lower end of the esophagus prevents stomach acid from flowing upward. When this valve doesn't work properly, stomach contents may repeatedly flow back up (reflux) into the esophagus. This is called gastroesophageal reflux disease (GERD). GERD can irritate the esophagus. It can cause problems with swallowing or breathing. In severe cases, GERD can cause recurrent pneumonia or other serious problems.  Symptoms of reflux include burning, pressure or sharp pain in the upper abdomen or mid to lower chest. The pain can spread to  "the neck, back, or shoulder. There may be belching, an acid taste in the back of the throat, chronic cough, or sore throat or hoarseness. GERD symptoms often occur during the day after a big meal. They can also occur at night when lying down.   Home care  Lifestyle changes can help reduce symptoms. If needed, medicines may be prescribed. Symptoms often improve with treatment, but if treatment is stopped, the symptoms often return after a few months. So most persons with GERD will need to continue treatment.  Lifestyle changes    Limit or avoid fatty, fried, and spicy foods, as well as coffee, chocolate, mint, and foods with high acid content such as tomatoes and citrus fruit and juices (orange, grapefruit, lemon).    Don t eat large meals, especially at night. Frequent, smaller meals are best. Do not lie down right after eating. And don t eat anything 3 hours before going to bed.    Avoid drinking alcohol and smoking. As much as possible, stay away from second hand smoke.    If you are overweight, losing weight will reduce symptoms.     Avoid wearing tight clothing around your stomach area.    If your symptoms occur during sleep, use a foam wedge to elevate your upper body (not just your head.) Or, place 4\" blocks under the head of your bed.  Medicines  If needed, medicines can help relieve the symptoms of GERD and prevent damage to the esophagus. Discuss a medicine plan with your healthcare provider. This may include one or more of the following medicines:    Antacids to help neutralize the normal acids in your stomach.    Acid blockers (H2 blockers) to decrease acid production.    Acid inhibitors (PPIs) to decrease acid production in a different way than the blockers. They may work better, but can take a little longer to take effect.  Take an antacid 30-60 minutes after eating and at bedtime, but not at the same time as an acid blocker.  Try not to take medicines such as ibuprofen and aspirin. If you are taking " aspirin for your heart or other medical reasons, talk to your healthcare provider about stopping it.  Follow-up care  Follow up with your healthcare provider or as advised by our staff.  When to seek medical advice  Call your healthcare provider if any of the following occur:    Stomach pain gets worse or moves to the lower right abdomen (appendix area)    Chest pain appears or gets worse, or spreads to the back, neck, shoulder, or arm    Frequent vomiting (can t keep down liquids)    Blood in the stool or vomit (red or black in color)    Feeling weak or dizzy    Fever of 100.4 F (38 C) or higher, or as directed by your healthcare provider    1107-2624 [employer]. [address] [place] [address], [place] [address]. All rights reserved. This information is not intended as a substitute for professional medical care. Always follow your healthcare professional's instructions.            MICHELLE Nassar [facility]

## 2017-03-17 ENCOUNTER — OFFICE VISIT (OUTPATIENT)
Dept: FAMILY MEDICINE | Facility: CLINIC | Age: 47
End: 2017-03-17
Payer: COMMERCIAL

## 2017-03-17 VITALS
HEIGHT: 71 IN | BODY MASS INDEX: 37.24 KG/M2 | RESPIRATION RATE: 16 BRPM | TEMPERATURE: 97.1 F | OXYGEN SATURATION: 97 % | WEIGHT: 266 LBS | SYSTOLIC BLOOD PRESSURE: 128 MMHG | HEART RATE: 75 BPM | DIASTOLIC BLOOD PRESSURE: 81 MMHG

## 2017-03-17 DIAGNOSIS — R07.89 ATYPICAL CHEST PAIN: Primary | ICD-10-CM

## 2017-03-17 DIAGNOSIS — K21.9 GASTROESOPHAGEAL REFLUX DISEASE WITHOUT ESOPHAGITIS: ICD-10-CM

## 2017-03-17 PROCEDURE — 93000 ELECTROCARDIOGRAM COMPLETE: CPT | Performed by: NURSE PRACTITIONER

## 2017-03-17 PROCEDURE — 99213 OFFICE O/P EST LOW 20 MIN: CPT | Performed by: NURSE PRACTITIONER

## 2017-03-17 RX ORDER — PANTOPRAZOLE SODIUM 40 MG/1
40 TABLET, DELAYED RELEASE ORAL DAILY
Qty: 30 TABLET | Refills: 1 | Status: SHIPPED | OUTPATIENT
Start: 2017-03-17 | End: 2017-06-19

## 2017-03-17 NOTE — MR AVS SNAPSHOT
After Visit Summary   3/17/2017    Phi Ortiz Jr.    MRN: [id number]           Patient Information     Date Of Birth          1970        Visit Information        Provider Department      3/17/2017 2:20 PM Peyton Mustafa APRN [facility]        Today's Diagnoses     Atypical chest pain    -  1    Gastroesophageal reflux disease without esophagitis          Care Instructions      GERD (Adult)    The esophagus is a tube that carries food from the mouth to the stomach. A valve at the lower end of the esophagus prevents stomach acid from flowing upward. When this valve doesn't work properly, stomach contents may repeatedly flow back up (reflux) into the esophagus. This is called gastroesophageal reflux disease (GERD). GERD can irritate the esophagus. It can cause problems with swallowing or breathing. In severe cases, GERD can cause recurrent pneumonia or other serious problems.  Symptoms of reflux include burning, pressure or sharp pain in the upper abdomen or mid to lower chest. The pain can spread to the neck, back, or shoulder. There may be belching, an acid taste in the back of the throat, chronic cough, or sore throat or hoarseness. GERD symptoms often occur during the day after a big meal. They can also occur at night when lying down.   Home care  Lifestyle changes can help reduce symptoms. If needed, medicines may be prescribed. Symptoms often improve with treatment, but if treatment is stopped, the symptoms often return after a few months. So most persons with GERD will need to continue treatment.  Lifestyle changes    Limit or avoid fatty, fried, and spicy foods, as well as coffee, chocolate, mint, and foods with high acid content such as tomatoes and citrus fruit and juices (orange, grapefruit, lemon).    Don t eat large meals, especially at night. Frequent, smaller meals are best. Do not lie down right after eating. And don t eat anything 3 hours before  "going to bed.    Avoid drinking alcohol and smoking. As much as possible, stay away from second hand smoke.    If you are overweight, losing weight will reduce symptoms.     Avoid wearing tight clothing around your stomach area.    If your symptoms occur during sleep, use a foam wedge to elevate your upper body (not just your head.) Or, place 4\" blocks under the head of your bed.  Medicines  If needed, medicines can help relieve the symptoms of GERD and prevent damage to the esophagus. Discuss a medicine plan with your healthcare provider. This may include one or more of the following medicines:    Antacids to help neutralize the normal acids in your stomach.    Acid blockers (H2 blockers) to decrease acid production.    Acid inhibitors (PPIs) to decrease acid production in a different way than the blockers. They may work better, but can take a little longer to take effect.  Take an antacid 30-60 minutes after eating and at bedtime, but not at the same time as an acid blocker.  Try not to take medicines such as ibuprofen and aspirin. If you are taking aspirin for your heart or other medical reasons, talk to your healthcare provider about stopping it.  Follow-up care  Follow up with your healthcare provider or as advised by our staff.  When to seek medical advice  Call your healthcare provider if any of the following occur:    Stomach pain gets worse or moves to the lower right abdomen (appendix area)    Chest pain appears or gets worse, or spreads to the back, neck, shoulder, or arm    Frequent vomiting (can t keep down liquids)    Blood in the stool or vomit (red or black in color)    Feeling weak or dizzy    Fever of 100.4 F (38 C) or higher, or as directed by your healthcare provider    2679-0072 [employer]. [address] [place] [address], [place] [address]. All rights reserved. This information is not intended as a substitute for professional medical care. Always follow your healthcare professional's " instructions.        Lifestyle Changes for Controlling GERD  When you have GERD, stomach acid feels as if it s backing up toward your mouth. Whether or not you take medication to control your GERD, your symptoms can often be improved with lifestyle changes. Talk to your doctor about the following suggestions, which may help you get relief from your symptoms.  Raise Your Head    Reflux is more likely to strike when you re lying down flat, because stomach fluid can flow backward more easily. Raising the head of your bed 4 to 6 inches can help. To do this:    Slide blocks or books under the legs at the head of your bed. Or, place a wedge under the mattress. [employer] can make a suitable wedge for you. The wedge should run from your waist to the top of your head.    Don t just prop your head on several pillows. This increases pressure on your stomach. It can make GERD worse.  Watch Your Eating Habits  Certain foods may increase the acid in your stomach or relax the lower esophageal sphincter, making GERD more likely. It s best to avoid the following:    Coffee, tea, and carbonated drinks (with and without caffeine)    Fatty, fried, or spicy food    Mint, chocolate, onions, and tomatoes    Any other foods that seem to irritate your stomach or cause you pain  Relieve the Pressure    Eat smaller meals, even if you have to eat more often.    Don t lie down right after you eat. Wait a few hours for your stomach to empty.    Avoid tight belts and tight-fitting clothes.    Lose excess weight.  Tobacco and Alcohol  Avoid smoking tobacco and drinking alcohol. They can make GERD symptoms worse.    4021-6128 [employer]. [address] [place] [address], [place] [address]. All rights reserved. This information is not intended as a substitute for professional medical care. Always follow your healthcare professional's instructions.        Surgery for GERD (Fundoplication)  You have gastroesophageal reflux disease (GERD).  This is a problem where food and fluid flow back (reflux) into your esophagus. Other treatments have not brought relief. Your doctor is now recommending a surgery called fundoplication. Read on to learn more.        The top of the stomach is wrapped around the esophagus.         The wrap is permanently stitched in place. Two commonly used wraps are full and partial.      What the Surgery Does  Your lower esophageal sphincter (LES) is a one-way valve at the top of the stomach. It keeps food and fluid from flowing backward. Your LES is weak. It does not close off the top of the stomach. This allows food and fluid to reflux into the esophagus. During fundoplication, the LES is restructured. This is done by wrapping the very top of the stomach around the lower part of the esophagus.  Two Techniques for Surgery  The surgery is most often done with laparoscopy. But it may also be done with open surgery.    Laparoscopy: This is surgery through a few small incisions. A thin, lighted tube called a laparoscope is used. The scope allows the doctor to see inside the body and work through the small incisions.    Open surgery: This is surgery through one larger incision. The doctor sees and works through this incision. It may be used if your doctor feels it isn t safe to continue with laparoscopic surgery.  During the Surgery  An intravenous line is put into a vein in your arm or hand. This line gives you fluids and medications. You are then given anesthesia. This is medication to keep you free from pain during surgery. Most often, general anesthesia is used. This puts you into a state like deep sleep during the surgery. Once the surgery begins:    If done by laparoscopy.    The doctor makes 2 to 4 small incisions in the abdomen. The scope is put through one of the incisions. The scope sends live pictures to a video screen. This allows the doctor see inside the abdomen.    Surgical tools are placed through the other small  incisions.    Your abdomen is  inflated with carbon dioxide. This gas provides space for the doctor to see and work.    If done by open surgery.    One larger incision will be made that will allow the doctor to see and work through.      The opening in the diaphragm that the esophagus travels through is called the hiatus. If the hiatus is too large, it s called a hiatal hernia. If this is present, the hiatus is tightened with a few stitches.    The stomach is wrapped around the outside of the esophagus. The wrap is stitched into place.    When the surgery is done, all tools are removed. Any incisions are closed with sutures or staples.  Risks and Complications of Fundoplication    Injury to the liver, spleen, esophagus, or stomach    Infection    Increased gas or bloating    Bleeding    Inability to vomit    Trouble swallowing    Failure to eliminate GERD     4520-6310 The [employer]. [address] [place] [address]. All rights reserved. This information is not intended as a substitute for professional medical care. Always follow your healthcare professional's instructions.              Follow-ups after your visit        Additional Services     GASTROENTEROLOGY ADULT REF PROCEDURE ONLY       Last Lab Result: Creatinine (mg/dL)       Date                     Value                 02/09/2017               0.74             ----------  Body mass index is 37.1 kg/(m^2).     Needed:  No  Language:  English    Patient will be contacted to schedule procedure.     Please be aware that coverage of these services is subject to the terms and limitations of your health insurance plan.  Call member services at your health plan with any benefit or coverage questions.  Any procedures must be performed at a [facility] facility OR coordinated by your clinic's referral office.    Please bring the following with you to your appointment:    (1) Any X-Rays, CTs or MRIs which have been performed.  Contact the  "facility where they were done to arrange for  prior to your scheduled appointment.    (2) List of current medications   (3) This referral request   (4) Any documents/labs given to you for this referral                  Who to contact     If you have questions or need follow up information about today's clinic visit or your schedule please contact [facility] directly at [phone].  Normal or non-critical lab and imaging results will be communicated to you by MyChart, letter or phone within 4 business days after the clinic has received the results. If you do not hear from us within 7 days, please contact the clinic through [employer] or phone. If you have a critical or abnormal lab result, we will notify you by phone as soon as possible.  Submit refill requests through [employer] or call your pharmacy and they will forward the refill request to us. Please allow 3 business days for your refill to be completed.          Additional Information About Your Visit        [employer] Information     [employer] lets you send messages to your doctor, view your test results, renew your prescriptions, schedule appointments and more. To sign up, go to [email or web address] . Click on \"Log in\" on the left side of the screen, which will take you to the Welcome page. Then click on \"Sign up Now\" on the right side of the page.     You will be asked to enter the access code listed below, as well as some personal information. Please follow the directions to create your username and password.     Your access code is: [email or web address]  Expires: 2017  4:44 PM     Your access code will  in 90 days. If you need help or a new code, please call your [facility] or [phone].        Care EveryWhere ID     This is your Care EveryWhere ID. This could be used by other organizations to access your [facility] medical records  [id number]        Your Vitals Were     Pulse Temperature Respirations Height Pulse Oximetry " "BMI (Body Mass Index)    75 97.1  F (36.2  C) (Tympanic) 16 5' 11\" (1.803 m) 97% 37.1 kg/m2       Blood Pressure from Last 3 Encounters:   03/17/17 128/81   03/16/17 128/76   02/09/17 137/81    Weight from Last 3 Encounters:   03/17/17 266 lb (120.7 kg)   03/16/17 266 lb (120.7 kg)   12/30/16 260 lb 12.8 oz (118.3 kg)              We Performed the Following     EKG 12-lead complete w/read - Clinics     GASTROENTEROLOGY ADULT REF PROCEDURE ONLY          Today's Medication Changes          These changes are accurate as of: 3/17/17  3:31 PM.  If you have any questions, ask your nurse or doctor.               Start taking these medicines.        Dose/Directions    pantoprazole 40 MG EC tablet   Commonly known as:  PROTONIX   Used for:  Gastroesophageal reflux disease without esophagitis   Started by:  Peyton Mustafa APRN CNP        Dose:  40 mg   Take 1 tablet (40 mg) by mouth daily Take 30-60 minutes before a meal.   Quantity:  30 tablet   Refills:  1         Stop taking these medicines if you haven't already. Please contact your care team if you have questions.     omeprazole 40 MG capsule   Commonly known as:  priLOSEC   Stopped by:  Peyton Mustafa APRN CNP                Where to get your medicines      These medications were sent to [facility] – [place], [place] - [address]  [address] [address] [facility] [address]     Phone:  [phone]     pantoprazole 40 MG EC tablet                Primary Care Provider Office Phone # Fax #    Genet Ortiz [phone]       [facility] [address] [facility] [address]        Thank you!     Thank you for choosing [facility]  for your care. Our goal is always to provide you with excellent care. Hearing back from our patients is one way we can continue to improve our services. Please take a few minutes to complete the written survey that you may receive in the mail " after your visit with us. Thank you!             Your Updated Medication List - Protect others around you: Learn how to safely use, store and throw away your medicines at www.disposemymeds.org.          This list is accurate as of: 3/17/17  3:31 PM.  Always use your most recent med list.                   Brand Name Dispense Instructions for use    albuterol 108 (90 BASE) MCG/ACT Inhaler    PROAIR HFA/PROVENTIL HFA/VENTOLIN HFA    1 Inhaler    Inhale 2 puffs into the lungs every 6 hours as needed for shortness of breath / dyspnea or wheezing       aspirin 81 MG EC tablet     90 tablet    Take 4 tablets (325 mg) by mouth daily       blood glucose lancing device     30 each    Use to test blood sugars 1 times daily or as directed.       blood glucose monitoring meter device kit     1 kit    Use to test blood sugars 1 times daily or as directed.       blood glucose monitoring test strip    ONE TOUCH ULTRA    1 Box    Use to test blood sugars 1 times daily or as directed.       citalopram 40 MG tablet    celeXA    30 tablet    Take 1 tablet (40 mg) by mouth daily       clonazePAM 1 MG tablet    klonoPIN    60 tablet    Take 1/2 - 1 tablet at bedtime.  If cramps and restless legs improve at night, may increase to BID dosing prn.       lisinopril 2.5 MG tablet    PRINIVIL/Zestril    90 tablet    Take 1 tablet (2.5 mg) by mouth daily       metFORMIN 500 MG 24 hr tablet    GLUCOPHAGE-XR    180 tablet    Take 1 tablet (500 mg) by mouth 2 times daily (with meals)       order for DME      Phi was set up on [employer]  Type [employer] (System One 60 series)  Serial Number: [id number] Modem Number: [id number] Pressure: AUTO-TITRATE CPAP 7-12 cm H20 Mask of choice: AIRFIT F 10 FULL FACE MASK Size: MEDIUM HEATED TUBING AND MODEM PROVIDED       pantoprazole 40 MG EC tablet    PROTONIX    30 tablet    Take 1 tablet (40 mg) by mouth daily Take 30-60 minutes before a meal.       rosuvastatin 40 MG tablet    CRESTOR     90 tablet    Take 1 tablet (40 mg) by mouth daily

## 2017-03-17 NOTE — PATIENT INSTRUCTIONS
"  GERD (Adult)    The esophagus is a tube that carries food from the mouth to the stomach. A valve at the lower end of the esophagus prevents stomach acid from flowing upward. When this valve doesn't work properly, stomach contents may repeatedly flow back up (reflux) into the esophagus. This is called gastroesophageal reflux disease (GERD). GERD can irritate the esophagus. It can cause problems with swallowing or breathing. In severe cases, GERD can cause recurrent pneumonia or other serious problems.  Symptoms of reflux include burning, pressure or sharp pain in the upper abdomen or mid to lower chest. The pain can spread to the neck, back, or shoulder. There may be belching, an acid taste in the back of the throat, chronic cough, or sore throat or hoarseness. GERD symptoms often occur during the day after a big meal. They can also occur at night when lying down.   Home care  Lifestyle changes can help reduce symptoms. If needed, medicines may be prescribed. Symptoms often improve with treatment, but if treatment is stopped, the symptoms often return after a few months. So most persons with GERD will need to continue treatment.  Lifestyle changes    Limit or avoid fatty, fried, and spicy foods, as well as coffee, chocolate, mint, and foods with high acid content such as tomatoes and citrus fruit and juices (orange, grapefruit, lemon).    Don t eat large meals, especially at night. Frequent, smaller meals are best. Do not lie down right after eating. And don t eat anything 3 hours before going to bed.    Avoid drinking alcohol and smoking. As much as possible, stay away from second hand smoke.    If you are overweight, losing weight will reduce symptoms.     Avoid wearing tight clothing around your stomach area.    If your symptoms occur during sleep, use a foam wedge to elevate your upper body (not just your head.) Or, place 4\" blocks under the head of your bed.  Medicines  If needed, medicines can help relieve " the symptoms of GERD and prevent damage to the esophagus. Discuss a medicine plan with your healthcare provider. This may include one or more of the following medicines:    Antacids to help neutralize the normal acids in your stomach.    Acid blockers (H2 blockers) to decrease acid production.    Acid inhibitors (PPIs) to decrease acid production in a different way than the blockers. They may work better, but can take a little longer to take effect.  Take an antacid 30-60 minutes after eating and at bedtime, but not at the same time as an acid blocker.  Try not to take medicines such as ibuprofen and aspirin. If you are taking aspirin for your heart or other medical reasons, talk to your healthcare provider about stopping it.  Follow-up care  Follow up with your healthcare provider or as advised by our staff.  When to seek medical advice  Call your healthcare provider if any of the following occur:    Stomach pain gets worse or moves to the lower right abdomen (appendix area)    Chest pain appears or gets worse, or spreads to the back, neck, shoulder, or arm    Frequent vomiting (can t keep down liquids)    Blood in the stool or vomit (red or black in color)    Feeling weak or dizzy    Fever of 100.4 F (38 C) or higher, or as directed by your healthcare provider    0536-3722 [employer]. [address] [place] [address], [place] [address]. All rights reserved. This information is not intended as a substitute for professional medical care. Always follow your healthcare professional's instructions.        Lifestyle Changes for Controlling GERD  When you have GERD, stomach acid feels as if it s backing up toward your mouth. Whether or not you take medication to control your GERD, your symptoms can often be improved with lifestyle changes. Talk to your doctor about the following suggestions, which may help you get relief from your symptoms.  Raise Your Head    Reflux is more likely to strike when you re lying down  flat, because stomach fluid can flow backward more easily. Raising the head of your bed 4 to 6 inches can help. To do this:    Slide blocks or books under the legs at the head of your bed. Or, place a wedge under the mattress. Many foam stores can make a suitable wedge for you. The wedge should run from your waist to the top of your head.    Don t just prop your head on several pillows. This increases pressure on your stomach. It can make GERD worse.  Watch Your Eating Habits  Certain foods may increase the acid in your stomach or relax the lower esophageal sphincter, making GERD more likely. It s best to avoid the following:    Coffee, tea, and carbonated drinks (with and without caffeine)    Fatty, fried, or spicy food    Mint, chocolate, onions, and tomatoes    Any other foods that seem to irritate your stomach or cause you pain  Relieve the Pressure    Eat smaller meals, even if you have to eat more often.    Don t lie down right after you eat. Wait a few hours for your stomach to empty.    Avoid tight belts and tight-fitting clothes.    Lose excess weight.  Tobacco and Alcohol  Avoid smoking tobacco and drinking alcohol. They can make GERD symptoms worse.    8968-3237 [employer]. [address] [place] [address]. All rights reserved. This information is not intended as a substitute for professional medical care. Always follow your healthcare professional's instructions.        Surgery for GERD (Fundoplication)  You have gastroesophageal reflux disease (GERD). This is a problem where food and fluid flow back (reflux) into your esophagus. Other treatments have not brought relief. Your doctor is now recommending a surgery called fundoplication. Read on to learn more.        The top of the stomach is wrapped around the esophagus.         The wrap is permanently stitched in place. Two commonly used wraps are full and partial.      What the Surgery Does  Your lower esophageal sphincter (LES)  is a one-way valve at the top of the stomach. It keeps food and fluid from flowing backward. Your LES is weak. It does not close off the top of the stomach. This allows food and fluid to reflux into the esophagus. During fundoplication, the LES is restructured. This is done by wrapping the very top of the stomach around the lower part of the esophagus.  Two Techniques for Surgery  The surgery is most often done with laparoscopy. But it may also be done with open surgery.    Laparoscopy: This is surgery through a few small incisions. A thin, lighted tube called a laparoscope is used. The scope allows the doctor to see inside the body and work through the small incisions.    Open surgery: This is surgery through one larger incision. The doctor sees and works through this incision. It may be used if your doctor feels it isn t safe to continue with laparoscopic surgery.  During the Surgery  An intravenous line is put into a vein in your arm or hand. This line gives you fluids and medications. You are then given anesthesia. This is medication to keep you free from pain during surgery. Most often, general anesthesia is used. This puts you into a state like deep sleep during the surgery. Once the surgery begins:    If done by laparoscopy.    The doctor makes 2 to 4 small incisions in the abdomen. The scope is put through one of the incisions. The scope sends live pictures to a video screen. This allows the doctor see inside the abdomen.    Surgical tools are placed through the other small incisions.    Your abdomen is  inflated with carbon dioxide. This gas provides space for the doctor to see and work.    If done by open surgery.    One larger incision will be made that will allow the doctor to see and work through.      The opening in the diaphragm that the esophagus travels through is called the hiatus. If the hiatus is too large, it s called a hiatal hernia. If this is present, the hiatus is tightened with a few  stitches.    The stomach is wrapped around the outside of the esophagus. The wrap is stitched into place.    When the surgery is done, all tools are removed. Any incisions are closed with sutures or staples.  Risks and Complications of Fundoplication    Injury to the liver, spleen, esophagus, or stomach    Infection    Increased gas or bloating    Bleeding    Inability to vomit    Trouble swallowing    Failure to eliminate GERD     2475-1330 [employer]. [address] [place] [address], [place] [address]. All rights reserved. This information is not intended as a substitute for professional medical care. Always follow your healthcare professional's instructions.

## 2017-03-17 NOTE — NURSING NOTE
"Chief Complaint   Patient presents with     Abdominal Pain     heartburn       Initial /81 (BP Location: Right arm, Patient Position: Chair, Cuff Size: Adult Large)  Pulse 75  Temp 97.1  F (36.2  C) (Tympanic)  Resp 16  Ht 5' 11\" (1.803 m)  Wt 266 lb (120.7 kg)  SpO2 97%  BMI 37.1 kg/m2 Estimated body mass index is 37.1 kg/(m^2) as calculated from the following:    Height as of this encounter: 5' 11\" (1.803 m).    Weight as of this encounter: 266 lb (120.7 kg).  Medication Reconciliation: complete  "

## 2017-05-03 ENCOUNTER — TRANSFERRED RECORDS (OUTPATIENT)
Dept: HEALTH INFORMATION MANAGEMENT | Facility: CLINIC | Age: 47
End: 2017-05-03

## 2017-05-24 ENCOUNTER — OFFICE VISIT (OUTPATIENT)
Dept: FAMILY MEDICINE | Facility: CLINIC | Age: 47
End: 2017-05-24
Payer: COMMERCIAL

## 2017-05-24 VITALS
WEIGHT: 268 LBS | HEART RATE: 62 BPM | RESPIRATION RATE: 16 BRPM | HEIGHT: 71 IN | TEMPERATURE: 97.9 F | DIASTOLIC BLOOD PRESSURE: 84 MMHG | SYSTOLIC BLOOD PRESSURE: 128 MMHG | BODY MASS INDEX: 37.52 KG/M2

## 2017-05-24 DIAGNOSIS — R30.0 DYSURIA: ICD-10-CM

## 2017-05-24 DIAGNOSIS — R35.0 URINARY FREQUENCY: Primary | ICD-10-CM

## 2017-05-24 LAB
ALBUMIN UR-MCNC: NEGATIVE MG/DL
APPEARANCE UR: CLEAR
BACTERIA #/AREA URNS HPF: ABNORMAL /HPF
BILIRUB UR QL STRIP: NEGATIVE
COLOR UR AUTO: YELLOW
GLUCOSE UR STRIP-MCNC: NEGATIVE MG/DL
HGB UR QL STRIP: ABNORMAL
KETONES UR STRIP-MCNC: NEGATIVE MG/DL
LEUKOCYTE ESTERASE UR QL STRIP: NEGATIVE
NITRATE UR QL: NEGATIVE
NON-SQ EPI CELLS #/AREA URNS LPF: ABNORMAL /LPF
PH UR STRIP: 6 PH (ref 5–7)
RBC #/AREA URNS AUTO: ABNORMAL /HPF (ref 0–2)
SP GR UR STRIP: 1.02 (ref 1–1.03)
URN SPEC COLLECT METH UR: ABNORMAL
UROBILINOGEN UR STRIP-ACNC: 0.2 EU/DL (ref 0.2–1)
WBC #/AREA URNS AUTO: ABNORMAL /HPF (ref 0–2)

## 2017-05-24 PROCEDURE — 87086 URINE CULTURE/COLONY COUNT: CPT | Performed by: NURSE PRACTITIONER

## 2017-05-24 PROCEDURE — 81001 URINALYSIS AUTO W/SCOPE: CPT | Performed by: NURSE PRACTITIONER

## 2017-05-24 PROCEDURE — 99213 OFFICE O/P EST LOW 20 MIN: CPT | Performed by: NURSE PRACTITIONER

## 2017-05-24 ASSESSMENT — ANXIETY QUESTIONNAIRES
7. FEELING AFRAID AS IF SOMETHING AWFUL MIGHT HAPPEN: SEVERAL DAYS
5. BEING SO RESTLESS THAT IT IS HARD TO SIT STILL: NEARLY EVERY DAY
2. NOT BEING ABLE TO STOP OR CONTROL WORRYING: SEVERAL DAYS
6. BECOMING EASILY ANNOYED OR IRRITABLE: MORE THAN HALF THE DAYS
1. FEELING NERVOUS, ANXIOUS, OR ON EDGE: NEARLY EVERY DAY
GAD7 TOTAL SCORE: 15
3. WORRYING TOO MUCH ABOUT DIFFERENT THINGS: MORE THAN HALF THE DAYS

## 2017-05-24 ASSESSMENT — PATIENT HEALTH QUESTIONNAIRE - PHQ9: 5. POOR APPETITE OR OVEREATING: NEARLY EVERY DAY

## 2017-05-24 NOTE — NURSING NOTE
"Chief Complaint   Patient presents with     Urinary Problem       Initial /84 (BP Location: Right arm, Patient Position: Chair, Cuff Size: Adult Large)  Pulse 62  Temp 97.9  F (36.6  C) (Oral)  Resp 16  Ht 5' 11\" (1.803 m)  Wt 268 lb (121.6 kg)  BMI 37.38 kg/m2 Estimated body mass index is 37.38 kg/(m^2) as calculated from the following:    Height as of this encounter: 5' 11\" (1.803 m).    Weight as of this encounter: 268 lb (121.6 kg).  Medication Reconciliation: complete  "

## 2017-05-24 NOTE — PROGRESS NOTES
"  SUBJECTIVE:                                                    Phi Ortiz Jr. is a 46 year old male who presents to clinic today for the following health issues:      Genitourinary symptoms      Duration: last week    Description:  frequency and nocturia x 2-3 towards the end of his night.    Intensity:  moderate    Accompanying signs and symptoms (fever/discharge/nausea/vomiting/back or abdominal pain):  Right back pain, dizzy, and left chest pain that he has had for 1 month, has had a work up done and nothing has been found. No change in the chest pain that he has had the work up done on, they are treating it as anxiety.    History (frequent UTI's/kidney stones/prostate problems): history of kidney stones.  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    Therapies tried and outcome: none   Outcome: na           Problem list and histories reviewed & adjusted, as indicated.  Additional history: he is monogamous, . He states symptoms have remained unchanged for the past week or so.  States it's just he feels he needs to void more often. Denies waking more at night, denies dribbling, denies feeling like he's not emptying his bladder. Denies dysuria.  Denies hematuria. Denies pelvic pain. He states his blood sugars have been \"good\" he is due for an a1c and diabetes check up and states he'll see his PCP for that.  He has a history of kidney stones, but has no pain with current complaints. He has switched his work shift and as a result of that, has missed some of his medications. He was recently started on klonopin for leg cramps and sleep. He denies any other changes or new medications recently.  Denies any other concerns and is otherwise feeling well.      Patient Active Problem List   Diagnosis     GERD (gastroesophageal reflux disease)     Generalized anxiety disorder     Fatty liver     Family history of coronary artery disease     " Hypertriglyceridemia     Vitamin D deficiency     Chest pain     Chronic ischemic heart disease     Hyperlipidemia LDL goal <70     Type 2 diabetes mellitus without complications (H)     Mild major depression (H)     overweight BMI greater than 35     Essential hypertension, benign     Past Surgical History:   Procedure Laterality Date     NO HISTORY OF SURGERY         Social History   Substance Use Topics     Smoking status: Never Smoker     Smokeless tobacco: Former User     Alcohol use Yes      Comment: 6 pack a month     Family History   Problem Relation Age of Onset     C.A.D. Father      heart attack 30's     CEREBROVASCULAR DISEASE Father      DIABETES Father      Hypertension Father      Hypertension Mother      GASTROINTESTINAL DISEASE Mother      colitis     DIABETES Maternal Grandmother      snores     C.A.D. Maternal Grandfather      MI, leg circulation problems     C.A.D. Paternal Grandfather      snores     Neurologic Disorder Sister      vertigo.  fibromyalgia     Hyperlipidemia Daughter      Breast Cancer No family hx of      Cancer - colorectal No family hx of      Prostate Cancer No family hx of          Current Outpatient Prescriptions   Medication Sig Dispense Refill     ASPIRIN EC PO Take 325 mg by mouth daily       pantoprazole (PROTONIX) 40 MG EC tablet Take 1 tablet (40 mg) by mouth daily Take 30-60 minutes before a meal. 30 tablet 1     clonazePAM (KLONOPIN) 1 MG tablet Take 1/2 - 1 tablet at bedtime.  If cramps and restless legs improve at night, may increase to BID dosing prn. 60 tablet 0     metFORMIN (GLUCOPHAGE-XR) 500 MG 24 hr tablet Take 1 tablet (500 mg) by mouth 2 times daily (with meals) 180 tablet 0     citalopram (CELEXA) 40 MG tablet Take 1 tablet (40 mg) by mouth daily 30 tablet 3     albuterol (PROAIR HFA, PROVENTIL HFA, VENTOLIN HFA) 108 (90 BASE) MCG/ACT inhaler Inhale 2 puffs into the lungs every 6 hours as needed for shortness of breath / dyspnea or wheezing 1 Inhaler 0  "    rosuvastatin (CRESTOR) 40 MG tablet Take 1 tablet (40 mg) by mouth daily 90 tablet 3     blood glucose monitoring (ONE TOUCH ULTRA 2) meter device kit Use to test blood sugars 1 times daily or as directed. 1 kit 0     blood glucose (ONE TOUCH DELICA) lancing device Use to test blood sugars 1 times daily or as directed. 30 each 6     blood glucose monitoring (ONE TOUCH ULTRA) test strip Use to test blood sugars 1 times daily or as directed. 1 Box 6     lisinopril (PRINIVIL,ZESTRIL) 2.5 MG tablet Take 1 tablet (2.5 mg) by mouth daily 90 tablet 1     ORDER FOR DME, SET TO FAX Phi was set up on [employer]  Type [employer] Auto (System One 60 series)  Serial Number: [id number] Modem Number: [id number] Pressure: AUTO-TITRATE CPAP 7-12 cm H20 Mask of choice: AIRFIT F 10 FULL FACE MASK Size: MEDIUM HEATED TUBING AND MODEM PROVIDED       No Known Allergies    Reviewed and updated as needed this visit by clinical staff  Tobacco  Allergies  Med Hx  Surg Hx  Fam Hx  Soc Hx      Reviewed and updated as needed this visit by Provider          ROS: 10 point ROS neg other than the symptoms noted above in the HPI.    OBJECTIVE:                                                    /84 (BP Location: Right arm, Patient Position: Chair, Cuff Size: Adult Large)  Pulse 62  Temp 97.9  F (36.6  C) (Oral)  Resp 16  Ht 5' 11\" (1.803 m)  Wt 268 lb (121.6 kg)  BMI 37.38 kg/m2  Body mass index is 37.38 kg/(m^2).  GENERAL: healthy, alert and no distress  HENT: ear canals and TM's normal, pharynx without erythema  NECK: no adenopathy, no asymmetry  RESP: lungs clear to auscultation - no rales, rhonchi or wheezes  CV: regular rate and rhythm, normal S1 S2, no S3 or S4, no murmur  ABDOMEN: soft, obese, nontender, no hepatosplenomegaly, no masses and bowel sounds normal, no CVA tenderness  MS: no gross musculoskeletal defects noted  Refused rectal exam.      Diagnostic Test Results:  Results for orders placed or " performed in visit on 05/24/17 (from the past 24 hour(s))   UA reflex to Microscopic and Culture   Result Value Ref Range    Color Urine Yellow     Appearance Urine Clear     Glucose Urine Negative NEG mg/dL    Bilirubin Urine Negative NEG    Ketones Urine Negative NEG mg/dL    Specific Gravity Urine 1.025 1.003 - 1.035    Blood Urine Trace (A) NEG    pH Urine 6.0 5.0 - 7.0 pH    Protein Albumin Urine Negative NEG mg/dL    Urobilinogen Urine 0.2 0.2 - 1.0 EU/dL    Nitrite Urine Negative NEG    Leukocyte Esterase Urine Negative NEG    Source Midstream Urine    Urine Microscopic   Result Value Ref Range    WBC Urine O - 2 0 - 2 /HPF    RBC Urine O - 2 0 - 2 /HPF    Squamous Epithelial /LPF Urine Few FEW /LPF    Bacteria Urine Few (A) NEG /HPF        ASSESSMENT/PLAN:                                                            1. Urinary frequency    - Urine Culture Aerobic Bacterial  No clear cut answers for his concerns. Could be prostatitis, will obtain UC and he will follow clinically. He will schedule appointment with PCP for diabetes check up. He had no glucose in urine and exam was normal.    2. Dysuria    - UA reflex to Microscopic and Culture  - Urine Microscopic    See Patient Instructions  Patient Instructions   Resume Citalopram daily.  Watch blood sugars. Schedule appointment for diabetes check up with PCP.  Will let you know results of urine culture.  If that's normal and you still have symptoms, recommend a consult with urology for further evaluation.  Follow up if symptoms persist or worsen and as needed.        Thank you for choosing [facility].  You may be receiving a survey in the mail from [employer] regarding your visit today.  Please take a few minutes to complete and return the survey to let us know how we are doing.      Our Clinic hours are:  Mondays    7:20 am - 7 pm  Tues -  Fri  7:20 am - 5 pm    Clinic Phone: [phone]    The clinic lab opens at 7:30 am Mon - Fri and appointments  are required.    [facility]  Ph. [phone]  Monday-Thursday 8 am - 7pm  Tues/Wed/Fri 8 am - 5:30 pm             MICHELLE Garcia [facility]

## 2017-05-24 NOTE — MR AVS SNAPSHOT
After Visit Summary   5/24/2017    Phi Ortiz Jr.    MRN: [id number]           Patient Information     Date Of Birth          1970        Visit Information        Provider Department      5/24/2017 8:00 AM Mckenna Caldwell APRN CNP [facility]        Today's Diagnoses     Urinary frequency    -  1    Dysuria          Care Instructions    Resume Citalopram daily.  Watch blood sugars. Schedule appointment for diabetes check up with PCP.  Will let you know results of urine culture.  If that's normal and you still have symptoms, recommend a consult with urology for further evaluation.  Follow up if symptoms persist or worsen and as needed.        Thank you for choosing [facility].  You may be receiving a survey in the mail from [employer] regarding your visit today.  Please take a few minutes to complete and return the survey to let us know how we are doing.      Our Clinic hours are:  Mondays    7:20 am - 7 pm  Tues -  Fri  7:20 am - 5 pm    Clinic Phone: [phone]    The clinic lab opens at 7:30 am Mon - Fri and appointments are required.    [facility] [place]  Ph. [phone]  Monday-Thursday 8 am - 7pm  Tues/Wed/Fri 8 am - 5:30 pm                 Follow-ups after your visit        Who to contact     If you have questions or need follow up information about today's clinic visit or your schedule please contact [facility] directly at [phone].  Normal or non-critical lab and imaging results will be communicated to you by MyChart, letter or phone within 4 business days after the clinic has received the results. If you do not hear from us within 7 days, please contact the clinic through MyChart or phone. If you have a critical or abnormal lab result, we will notify you by phone as soon as possible.  Submit refill requests through [employer] or call your pharmacy and they will forward the refill request to us. Please allow 3 business  "days for your refill to be completed.          Additional Information About Your Visit        [employer] Information     [employer] lets you send messages to your doctor, view your test results, renew your prescriptions, schedule appointments and more. To sign up, go to [email or web address] . Click on \"Log in\" on the left side of the screen, which will take you to the Welcome page. Then click on \"Sign up Now\" on the right side of the page.     You will be asked to enter the access code listed below, as well as some personal information. Please follow the directions to create your username and password.     Your access code is: [email or web address]  Expires: 2017  4:44 PM     Your access code will  in 90 days. If you need help or a new code, please call your [place] clinic or [phone].        Care EveryWhere ID     This is your [facility] EveryWhere ID. This could be used by other organizations to access your [place] medical records  [id number]        Your Vitals Were     Pulse Temperature Respirations Height BMI (Body Mass Index)       62 97.9  F (36.6  C) (Oral) 16 5' 11\" (1.803 m) 37.38 kg/m2        Blood Pressure from Last 3 Encounters:   17 128/84   17 128/81   17 128/76    Weight from Last 3 Encounters:   17 268 lb (121.6 kg)   17 266 lb (120.7 kg)   17 266 lb (120.7 kg)              We Performed the Following     UA reflex to Microscopic and Culture     Urine Culture Aerobic Bacterial     Urine Microscopic          Today's Medication Changes          These changes are accurate as of: 17  8:44 AM.  If you have any questions, ask your nurse or doctor.               These medicines have changed or have updated prescriptions.        Dose/Directions    ASPIRIN EC PO   This may have changed:  Another medication with the same name was removed. Continue taking this medication, and follow the directions you see here.   Changed by:  Mckenna Caldwell APRN CNP        Dose:  " 325 mg   Take 325 mg by mouth daily   Refills:  0                Primary Care Provider Office Phone # Fax Kenna Ortiz -[phone]       [place] [address] [address] [facility] [address]        Thank you!     Thank you for choosing [facility]  for your care. Our goal is always to provide you with excellent care. Hearing back from our patients is one way we can continue to improve our services. Please take a few minutes to complete the written survey that you may receive in the mail after your visit with us. Thank you!             Your Updated Medication List - Protect others around you: Learn how to safely use, store and throw away your medicines at www.disposemymeds.org.          This list is accurate as of: 5/24/17  8:44 AM.  Always use your most recent med list.                   Brand Name Dispense Instructions for use    albuterol 108 (90 BASE) MCG/ACT Inhaler    PROAIR HFA/PROVENTIL HFA/VENTOLIN HFA    1 Inhaler    Inhale 2 puffs into the lungs every 6 hours as needed for shortness of breath / dyspnea or wheezing       ASPIRIN EC PO      Take 325 mg by mouth daily       blood glucose lancing device     30 each    Use to test blood sugars 1 times daily or as directed.       blood glucose monitoring meter device kit     1 kit    Use to test blood sugars 1 times daily or as directed.       blood glucose monitoring test strip    ONE TOUCH ULTRA    1 Box    Use to test blood sugars 1 times daily or as directed.       citalopram 40 MG tablet    celeXA    30 tablet    Take 1 tablet (40 mg) by mouth daily       clonazePAM 1 MG tablet    klonoPIN    60 tablet    Take 1/2 - 1 tablet at bedtime.  If cramps and restless legs improve at night, may increase to BID dosing prn.       lisinopril 2.5 MG tablet    PRINIVIL/Zestril    90 tablet    Take 1 tablet (2.5 mg) by mouth daily       metFORMIN 500 MG 24 hr tablet    GLUCOPHAGE-XR    180 tablet    Take 1 tablet  (500 mg) by mouth 2 times daily (with meals)       order for DME      Phi was set up on [employer]  Type [employer] Auto (System One 60 series)  Serial Number: [id number] Modem Number: [id number] Pressure: AUTO-TITRATE CPAP 7-12 cm H20 Mask of choice: AIRFIT F 10 FULL FACE MASK Size: MEDIUM HEATED TUBING AND MODEM PROVIDED       pantoprazole 40 MG EC tablet    PROTONIX    30 tablet    Take 1 tablet (40 mg) by mouth daily Take 30-60 minutes before a meal.       rosuvastatin 40 MG tablet    CRESTOR    90 tablet    Take 1 tablet (40 mg) by mouth daily

## 2017-05-24 NOTE — PATIENT INSTRUCTIONS
Resume Citalopram daily.  Watch blood sugars. Schedule appointment for diabetes check up with PCP.  Will let you know results of urine culture.  If that's normal and you still have symptoms, recommend a consult with urology for further evaluation.  Follow up if symptoms persist or worsen and as needed.        Thank you for choosing [facility].  You may be receiving a survey in the mail from [facility] regarding your visit today.  Please take a few minutes to complete and return the survey to let us know how we are doing.      Our Clinic hours are:  Mondays    7:20 am - 7 pm  Tues -  Fri  7:20 am - 5 pm    Clinic Phone: [phone]    The clinic lab opens at 7:30 am Mon - Fri and appointments are required.    [facility]. [phone]  Monday-Thursday 8 am - 7pm  Tues/Wed/Fri 8 am - 5:30 pm

## 2017-05-25 ASSESSMENT — PATIENT HEALTH QUESTIONNAIRE - PHQ9: SUM OF ALL RESPONSES TO PHQ QUESTIONS 1-9: 13

## 2017-05-25 ASSESSMENT — ANXIETY QUESTIONNAIRES: GAD7 TOTAL SCORE: 15

## 2017-05-26 LAB
BACTERIA SPEC CULT: NORMAL
MICRO REPORT STATUS: NORMAL
SPECIMEN SOURCE: NORMAL

## 2017-05-30 DIAGNOSIS — R35.0 INCREASED FREQUENCY OF URINATION: Primary | ICD-10-CM

## 2017-06-01 ENCOUNTER — TELEPHONE (OUTPATIENT)
Dept: CARDIOLOGY | Facility: CLINIC | Age: 47
End: 2017-06-01

## 2017-06-01 NOTE — TELEPHONE ENCOUNTER
"Pt called to report that he continues to experience CP, SOB, particularly with exertion. Pt states he has been experiencing this on/off for \"years\" and is frustrated that \"no one has an answer for me.\" Pt saw Dr. Dey in 2016, and states he has been to the ER and started on anti-anxiety medications with no improvement. Informed pt that I would update Dr. Dey's nurse of his symptoms and they will follow-up with him. Pt verbalized understanding.  "

## 2017-06-02 ENCOUNTER — OFFICE VISIT (OUTPATIENT)
Dept: FAMILY MEDICINE | Facility: CLINIC | Age: 47
End: 2017-06-02
Payer: COMMERCIAL

## 2017-06-02 VITALS
WEIGHT: 260.3 LBS | HEIGHT: 71 IN | BODY MASS INDEX: 36.44 KG/M2 | HEART RATE: 77 BPM | SYSTOLIC BLOOD PRESSURE: 121 MMHG | DIASTOLIC BLOOD PRESSURE: 78 MMHG | TEMPERATURE: 98.2 F

## 2017-06-02 DIAGNOSIS — R53.83 OTHER FATIGUE: ICD-10-CM

## 2017-06-02 DIAGNOSIS — I10 ESSENTIAL HYPERTENSION, BENIGN: ICD-10-CM

## 2017-06-02 DIAGNOSIS — E11.9 TYPE 2 DIABETES MELLITUS WITHOUT COMPLICATION, WITHOUT LONG-TERM CURRENT USE OF INSULIN (H): ICD-10-CM

## 2017-06-02 DIAGNOSIS — R07.89 ATYPICAL CHEST PAIN: Primary | ICD-10-CM

## 2017-06-02 DIAGNOSIS — R35.0 URINARY FREQUENCY: ICD-10-CM

## 2017-06-02 LAB
ALBUMIN UR-MCNC: ABNORMAL MG/DL
APPEARANCE UR: CLEAR
BACTERIA #/AREA URNS HPF: ABNORMAL /HPF
BASOPHILS # BLD AUTO: 0.1 10E9/L (ref 0–0.2)
BASOPHILS NFR BLD AUTO: 0.9 %
BILIRUB UR QL STRIP: NEGATIVE
COLOR UR AUTO: YELLOW
DIFFERENTIAL METHOD BLD: NORMAL
EOSINOPHIL # BLD AUTO: 0.1 10E9/L (ref 0–0.7)
EOSINOPHIL NFR BLD AUTO: 1.2 %
ERYTHROCYTE [DISTWIDTH] IN BLOOD BY AUTOMATED COUNT: 12.7 % (ref 10–15)
GLUCOSE UR STRIP-MCNC: NEGATIVE MG/DL
HBA1C MFR BLD: 7 % (ref 4.3–6)
HCT VFR BLD AUTO: 46.7 % (ref 40–53)
HGB BLD-MCNC: 15.9 G/DL (ref 13.3–17.7)
HGB UR QL STRIP: ABNORMAL
KETONES UR STRIP-MCNC: NEGATIVE MG/DL
LEUKOCYTE ESTERASE UR QL STRIP: NEGATIVE
LYMPHOCYTES # BLD AUTO: 2 10E9/L (ref 0.8–5.3)
LYMPHOCYTES NFR BLD AUTO: 33.9 %
MCH RBC QN AUTO: 31.1 PG (ref 26.5–33)
MCHC RBC AUTO-ENTMCNC: 34 G/DL (ref 31.5–36.5)
MCV RBC AUTO: 91 FL (ref 78–100)
MONOCYTES # BLD AUTO: 0.6 10E9/L (ref 0–1.3)
MONOCYTES NFR BLD AUTO: 9.7 %
NEUTROPHILS # BLD AUTO: 3.1 10E9/L (ref 1.6–8.3)
NEUTROPHILS NFR BLD AUTO: 54.3 %
NITRATE UR QL: NEGATIVE
NON-SQ EPI CELLS #/AREA URNS LPF: ABNORMAL /LPF
PH UR STRIP: 5 PH (ref 5–7)
PLATELET # BLD AUTO: 203 10E9/L (ref 150–450)
PSA SERPL-MCNC: 0.75 UG/L (ref 0–4)
RBC # BLD AUTO: 5.11 10E12/L (ref 4.4–5.9)
RBC #/AREA URNS AUTO: ABNORMAL /HPF (ref 0–2)
SP GR UR STRIP: 1.02 (ref 1–1.03)
URN SPEC COLLECT METH UR: ABNORMAL
UROBILINOGEN UR STRIP-ACNC: 0.2 EU/DL (ref 0.2–1)
WBC # BLD AUTO: 5.8 10E9/L (ref 4–11)
WBC #/AREA URNS AUTO: ABNORMAL /HPF (ref 0–2)

## 2017-06-02 PROCEDURE — 85025 COMPLETE CBC W/AUTO DIFF WBC: CPT | Performed by: FAMILY MEDICINE

## 2017-06-02 PROCEDURE — 36415 COLL VENOUS BLD VENIPUNCTURE: CPT | Performed by: FAMILY MEDICINE

## 2017-06-02 PROCEDURE — 93000 ELECTROCARDIOGRAM COMPLETE: CPT | Performed by: FAMILY MEDICINE

## 2017-06-02 PROCEDURE — 99214 OFFICE O/P EST MOD 30 MIN: CPT | Performed by: FAMILY MEDICINE

## 2017-06-02 PROCEDURE — 83036 HEMOGLOBIN GLYCOSYLATED A1C: CPT | Performed by: FAMILY MEDICINE

## 2017-06-02 PROCEDURE — 84153 ASSAY OF PSA TOTAL: CPT | Performed by: FAMILY MEDICINE

## 2017-06-02 PROCEDURE — 81001 URINALYSIS AUTO W/SCOPE: CPT | Performed by: FAMILY MEDICINE

## 2017-06-02 NOTE — PATIENT INSTRUCTIONS
Thank you for choosing [facility].  You may be receiving a survey in the mail from [facility] regarding your visit today.  Please take a few minutes to complete and return the survey to let us know how we are doing.      Our Clinic hours are:  Mondays    7:20 am - 7 pm  Tues -  Fri  7:20 am - 5 pm    Clinic Phone: [phone]    The clinic lab opens at 7:30 am Mon - Fri and appointments are required.    [facility]. [phone]  Monday-Thursday 8 am - 7pm  Tues/Wed/Fri 8 am - 5:30 pm

## 2017-06-02 NOTE — MR AVS SNAPSHOT
After Visit Summary   6/2/2017    Phi Ortiz Jr.    MRN: [id number]           Patient Information     Date Of Birth          1970        Visit Information        Provider Department      6/2/2017 11:00 AM Zhao Crook MD [facility]        Today's Diagnoses     Atypical chest pain    -  1    Essential hypertension, benign        Type 2 diabetes mellitus without complication, without long-term current use of insulin (H)        Urinary frequency        Other fatigue          Care Instructions          Thank you for choosing [facility].  You may be receiving a survey in the mail from [employer] regarding your visit today.  Please take a few minutes to complete and return the survey to let us know how we are doing.      Our Clinic hours are:  Mondays    7:20 am - 7 pm  Tues -  Fri  7:20 am - 5 pm    Clinic Phone: [phone]    The clinic lab opens at 7:30 am Mon - Fri and appointments are required.    [facility] [place]  Ph. [phone]  Monday-Thursday 8 am - 7pm  Tues/Wed/Fri 8 am - 5:30 pm                 Follow-ups after your visit        Your next 10 appointments already scheduled     Jul 06, 2017  2:00 PM CDT   New Visit with [facility]    [address] [facility] [address] [phone]              Future tests that were ordered for you today     Open Future Orders        Priority Expected Expires Ordered    NM Exercise stress test Routine  6/2/2018 6/2/2017            Who to contact     If you have questions or need follow up information about today's clinic visit or your schedule please contact [facility] directly at [phone].  Normal or non-critical lab and imaging results will be communicated to you by MyChart, letter or phone within 4 business days after the clinic has received the results. If you do not hear from us within  "7 days, please contact the clinic through [employer] or phone. If you have a critical or abnormal lab result, we will notify you by phone as soon as possible.  Submit refill requests through [employer] or call your pharmacy and they will forward the refill request to us. Please allow 3 business days for your refill to be completed.          Additional Information About Your Visit        [employer] Information     [employer] lets you send messages to your doctor, view your test results, renew your prescriptions, schedule appointments and more. To sign up, go to [email or web address] . Click on \"Log in\" on the left side of the screen, which will take you to the Welcome page. Then click on \"Sign up Now\" on the right side of the page.     You will be asked to enter the access code listed below, as well as some personal information. Please follow the directions to create your username and password.     Your access code is: [email or web address]  Expires: 2017  4:44 PM     Your access code will  in 90 days. If you need help or a new code, please call your [place] clinic or [phone].        Care EveryWhere ID     This is your Care EveryWhere ID. This could be used by other organizations to access your [place] medical records  [id number]        Your Vitals Were     Pulse Temperature Height BMI (Body Mass Index)          77 98.2  F (36.8  C) (Tympanic) 5' 11\" (1.803 m) 36.3 kg/m2         Blood Pressure from Last 3 Encounters:   17 121/78   17 128/84   17 128/81    Weight from Last 3 Encounters:   17 260 lb 4.8 oz (118.1 kg)   17 268 lb (121.6 kg)   17 266 lb (120.7 kg)              We Performed the Following     CBC with platelets differential     EKG 12-lead complete w/read - Clinics     Hemoglobin A1c     PSA tumor marker     UA reflex to Microscopic and Culture     Urine Microscopic          Today's Medication Changes          These changes are accurate as of: 17 12:56 PM.  If you " have any questions, ask your nurse or doctor.               Start taking these medicines.        Dose/Directions    order for DME   Used for:  Essential hypertension, benign        Automated blood pressure cuff.   Quantity:  1 each   Refills:  0         Stop taking these medicines if you haven't already. Please contact your care team if you have questions.     citalopram 40 MG tablet   Commonly known as:  celeXA           clonazePAM 1 MG tablet   Commonly known as:  klonoPIN                Where to get your medicines      Some of these will need a paper prescription and others can be bought over the counter.  Ask your nurse if you have questions.     Bring a paper prescription for each of these medications     order for DME                Primary Care Provider Office Phone # Fax #    Genet JONE Ortiz [phone]       [address] [address]        Thank you!     Thank you for choosing [facility]  for your care. Our goal is always to provide you with excellent care. Hearing back from our patients is one way we can continue to improve our services. Please take a few minutes to complete the written survey that you may receive in the mail after your visit with us. Thank you!             Your Updated Medication List - Protect others around you: Learn how to safely use, store and throw away your medicines at www.disposemymeds.org.          This list is accurate as of: 6/2/17 12:56 PM.  Always use your most recent med list.                   Brand Name Dispense Instructions for use    albuterol 108 (90 BASE) MCG/ACT Inhaler    PROAIR HFA/PROVENTIL HFA/VENTOLIN HFA    1 Inhaler    Inhale 2 puffs into the lungs every 6 hours as needed for shortness of breath / dyspnea or wheezing       ASPIRIN EC PO      Take 325 mg by mouth daily       blood glucose lancing device     30 each    Use to test blood sugars 1 times daily or as directed.       blood  glucose monitoring meter device kit     1 kit    Use to test blood sugars 1 times daily or as directed.       blood glucose monitoring test strip    ONE TOUCH ULTRA    1 Box    Use to test blood sugars 1 times daily or as directed.       lisinopril 2.5 MG tablet    PRINIVIL/Zestril    90 tablet    Take 1 tablet (2.5 mg) by mouth daily       metFORMIN 500 MG 24 hr tablet    GLUCOPHAGE-XR    180 tablet    Take 1 tablet (500 mg) by mouth 2 times daily (with meals)       order for DME      Phi was set up on [employer]  Type [employer] Auto (System One 60 series)  Serial Number: [id number] Modem Number: [id number] Pressure: AUTO-TITRATE CPAP 7-12 cm H20 Mask of choice: AIRFIT F 10 FULL FACE MASK Size: MEDIUM HEATED TUBING AND MODEM PROVIDED       order for DME     1 each    Automated blood pressure cuff.       pantoprazole 40 MG EC tablet    PROTONIX    30 tablet    Take 1 tablet (40 mg) by mouth daily Take 30-60 minutes before a meal.       rosuvastatin 40 MG tablet    CRESTOR    90 tablet    Take 1 tablet (40 mg) by mouth daily

## 2017-06-02 NOTE — PROGRESS NOTES
SUBJECTIVE:                                                    Phi Ortiz Jr. is a 46 year old male who presents to clinic today for the following health issues:    Chief Complaint   Patient presents with     Memory Loss     /forgetfulness, x3 weeks       Chest Pain     chest pains, shortness of breath off and on for years. Has a history of 30% LAD occlusion on angiogram last year - saw cardiology medically managed. Has noticed that chest pain is occurring more often recently. Feeling increasingly short of breath. More easily short of breath in general. No pain at rest.     Frequency     urine frequency 3-4 weeks, waking up at night to urinate. No dysuria. No hematuria. No change in stream quality. 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     Anxiety     Not feeling anxious,  stopped taking anxiety medication - didn't really seem to have any effect on chest pain.     Diabetes     would like labs checked for his diabetes     Medication Reconciliation     pt not taking klonopin and celexa         Diabetes Follow-up    Patient is checking blood sugars: once daily.  Results are as follows:         Ranging around 108 - 112    Diabetic concerns: None     Symptoms of hypoglycemia (low blood sugar): none     Paresthesias (numbness or burning in feet) or sores: No     Date of last diabetic eye exam: 2017 [place]     Hyperlipidemia Follow-Up      Rate your low fat/cholesterol diet?: good    Taking statin?  Yes, no muscle aches from statin    Other lipid medications/supplements?:  none     Hypertension Follow-up      Outpatient blood pressures are not being checked.    Low Salt Diet: not monitoring salt         Amount of exercise or physical activity: routine daily activies    Problems taking medications regularly: No    Medication side effects: none    Diet: regular (no restrictions) and breakfast skipped    ADDITIONAL HPI: 46 year old male here for above issue.       ROS: 10 point review of systems negative except as per HPI.    PAST MEDICAL HISTORY:  Past Medical History:   Diagnosis Date     Anxiety      Chest pain 1/27/2014     GERD (gastroesophageal reflux disease)         ACTIVE MEDICAL PROBLEMS:  Patient Active Problem List   Diagnosis     GERD (gastroesophageal reflux disease)     Generalized anxiety disorder     Fatty liver     Family history of coronary artery disease     Hypertriglyceridemia     Vitamin D deficiency     Chest pain     Chronic ischemic heart disease     Hyperlipidemia LDL goal <70     Type 2 diabetes mellitus without complications (H)     Mild major depression (H)     overweight BMI greater than 35     Essential hypertension, benign        FAMILY HISTORY:  Family History   Problem Relation Age of Onset     C.A.D. Father      heart attack 30's     CEREBROVASCULAR DISEASE Father      DIABETES Father      Hypertension Father      Hypertension Mother      GASTROINTESTINAL DISEASE Mother      colitis     DIABETES Maternal Grandmother      snores     C.A.D. Maternal Grandfather      MI, leg circulation problems     C.A.D. Paternal Grandfather      snores     Neurologic Disorder Sister      vertigo.  fibromyalgia     Hyperlipidemia Daughter      Breast Cancer No family hx of      Cancer - colorectal No family hx of      Prostate Cancer No family hx of        SOCIAL HISTORY:  Social History     Social History     Marital status:      Spouse name: N/A     Number of children: N/A     Years of education: N/A     Occupational History     Not on file.     Social History Main Topics     Smoking status: Never Smoker     Smokeless tobacco: Former User     Alcohol use Yes      Comment: 6 pack a month     Drug use: No     Sexual activity: Yes     Partners: Female     Other Topics Concern     Parent/Sibling W/ Cabg, Mi Or Angioplasty Before 65f 55m? Yes     Social History Narrative       MEDICATIONS:  Current Outpatient Prescriptions   Medication Sig Dispense  "Refill     order for DME Automated blood pressure cuff. 1 each 0     ASPIRIN EC PO Take 325 mg by mouth daily       pantoprazole (PROTONIX) 40 MG EC tablet Take 1 tablet (40 mg) by mouth daily Take 30-60 minutes before a meal. 30 tablet 1     metFORMIN (GLUCOPHAGE-XR) 500 MG 24 hr tablet Take 1 tablet (500 mg) by mouth 2 times daily (with meals) 180 tablet 0     albuterol (PROAIR HFA, PROVENTIL HFA, VENTOLIN HFA) 108 (90 BASE) MCG/ACT inhaler Inhale 2 puffs into the lungs every 6 hours as needed for shortness of breath / dyspnea or wheezing 1 Inhaler 0     rosuvastatin (CRESTOR) 40 MG tablet Take 1 tablet (40 mg) by mouth daily 90 tablet 3     blood glucose monitoring (ONE TOUCH ULTRA 2) meter device kit Use to test blood sugars 1 times daily or as directed. 1 kit 0     blood glucose (ONE TOUCH DELICA) lancing device Use to test blood sugars 1 times daily or as directed. 30 each 6     blood glucose monitoring (ONE TOUCH ULTRA) test strip Use to test blood sugars 1 times daily or as directed. 1 Box 6     ORDER FOR DME, SET TO FAX, Phi was set up on [employer]  Type [employer] Auto (System One 60 series)  Serial Number: [id number] Modem Number: [id number] Pressure: AUTO-TITRATE CPAP 7-12 cm H20 Mask of choice: AIRFIT F 10 FULL FACE MASK Size: MEDIUM HEATED TUBING AND MODEM PROVIDED       lisinopril (PRINIVIL,ZESTRIL) 2.5 MG tablet Take 1 tablet (2.5 mg) by mouth daily (Patient not taking: Reported on 6/2/2017) 90 tablet 1       ALLERGIES:   No Known Allergies    Problem list, Medication list, Allergies, and Medical/Social/Surgical histories reviewed in [facility] and updated as appropriate.    OBJECTIVE:                                                    VITALS: /78 (BP Location: Right arm, Cuff Size: Adult Large)  Pulse 77  Temp 98.2  F (36.8  C) (Tympanic)  Ht 5' 11\" (1.803 m)  Wt 260 lb 4.8 oz (118.1 kg)  BMI 36.3 kg/m2 Body mass index is 36.3 kg/(m^2).  GENERAL: Pleasant, well appearing " male.  HEENT: PERRL, EOMI, oropharynx clear.  NECK: supple, no thyromegaly or thyroid masses, no lymphadenopathy.  CV: RRR, no murmurs, rubs or gallops. No carotid bruits.   LUNGS: Clear to auscultation bilaterally, normal effort.  ABDOMEN: Soft, non-distended, non-tender.  No hepatosplenomegaly or palpable masses.    SKIN: warm and dry without obvious rashes.   EXTREMITIES: No edema, normal pulses.     EKG: (read and interpreted by me) NSR no acute st-t changes.    Results for orders placed or performed in visit on 06/02/17   UA reflex to Microscopic and Culture   Result Value Ref Range    Color Urine Yellow     Appearance Urine Clear     Glucose Urine Negative NEG mg/dL    Bilirubin Urine Negative NEG    Ketones Urine Negative NEG mg/dL    Specific Gravity Urine 1.025 1.003 - 1.035    Blood Urine Trace (A) NEG    pH Urine 5.0 5.0 - 7.0 pH    Protein Albumin Urine Trace (A) NEG mg/dL    Urobilinogen Urine 0.2 0.2 - 1.0 EU/dL    Nitrite Urine Negative NEG    Leukocyte Esterase Urine Negative NEG    Source Midstream Urine    Urine Microscopic   Result Value Ref Range    WBC Urine O - 2 0 - 2 /HPF    RBC Urine O - 2 0 - 2 /HPF    Squamous Epithelial /LPF Urine Few FEW /LPF    Bacteria Urine Few (A) NEG /HPF        ASSESSMENT/PLAN:                                                    1. Atypical chest pain  Has a history of known CAD. 30% LAD occlusion on 2014 cath.  Given progression of exertional chest pain/sob needs further cardiac work-up despite normal ekg. Will schedule for nuc stress. Further work-up and management as dictated by results.   - NM Exercise stress test; Future  - EKG 12-lead complete w/read - Clinics    2. Essential hypertension, benign  Stable    - order for DME; Automated blood pressure cuff.  Dispense: 1 each; Refill: 0    3. Type 2 diabetes mellitus without complication, without long-term current use of insulin (H)  Stable check labs.   - Hemoglobin A1c    4. Urinary frequency  Unclear etiology.  Will check PSA. Had urine cultured recently and this was negative. May have to do with increase water intake. Further work-up and management as dictated by results.   - UA reflex to Microscopic and Culture  - Urine Microscopic  - PSA tumor marker    5. Other fatigue  Discussed often multi-factorial etiology of fatigue. Will initiate further work-up with labs as below. Further work-up and management as dictated by results. Also discussed good sleep hygiene and stress management as contributing lifestyle factors.    - CBC with platelets differential

## 2017-06-02 NOTE — LETTER
Formerly named [facility]  [address] [address]  Phone: [phone]    June 8, 2017    Phi Ortiz Jr.  [address] [address] [address]          Dear Phi,    The results of your recent lab tests were Sugars slightly worse than previous but within acceptable range. Otherwise labs look good. Enclosed is a copy of these results.  If you have any further questions or problems, please contact our office.  Results for orders placed or performed in visit on 06/02/17   UA reflex to Microscopic and Culture   Result Value Ref Range    Color Urine Yellow     Appearance Urine Clear     Glucose Urine Negative NEG mg/dL    Bilirubin Urine Negative NEG    Ketones Urine Negative NEG mg/dL    Specific Gravity Urine 1.025 1.003 - 1.035    Blood Urine Trace (A) NEG    pH Urine 5.0 5.0 - 7.0 pH    Protein Albumin Urine Trace (A) NEG mg/dL    Urobilinogen Urine 0.2 0.2 - 1.0 EU/dL    Nitrite Urine Negative NEG    Leukocyte Esterase Urine Negative NEG    Source Midstream Urine    Urine Microscopic   Result Value Ref Range    WBC Urine O - 2 0 - 2 /HPF    RBC Urine O - 2 0 - 2 /HPF    Squamous Epithelial /LPF Urine Few FEW /LPF    Bacteria Urine Few (A) NEG /HPF   Hemoglobin A1c   Result Value Ref Range    Hemoglobin A1C 7.0 (H) 4.3 - 6.0 %   CBC with platelets differential   Result Value Ref Range    WBC 5.8 4.0 - 11.0 10e9/L    RBC Count 5.11 4.4 - 5.9 10e12/L    Hemoglobin 15.9 13.3 - 17.7 g/dL    Hematocrit 46.7 40.0 - 53.0 %    MCV 91 78 - 100 fl    MCH 31.1 26.5 - 33.0 pg    MCHC 34.0 31.5 - 36.5 g/dL    RDW 12.7 10.0 - 15.0 %    Platelet Count 203 150 - 450 10e9/L    Diff Method Automated Method     % Neutrophils 54.3 %    % Lymphocytes 33.9 %    % Monocytes 9.7 %    % Eosinophils 1.2 %    % Basophils 0.9 %    Absolute Neutrophil 3.1 1.6 - 8.3 10e9/L    Absolute Lymphocytes 2.0 0.8 - 5.3 10e9/L    Absolute Monocytes 0.6 0.0 - 1.3 10e9/L    Absolute Eosinophils 0.1 0.0 - 0.7 10e9/L     Absolute Basophils 0.1 0.0 - 0.2 10e9/L   PSA tumor marker   Result Value Ref Range    PSA 0.75 0 - 4 ug/L     Sincerely,      Zhao Crook MD

## 2017-06-02 NOTE — NURSING NOTE
"Chief Complaint   Patient presents with     Memory Loss     /forgetfulness, x3 weeks       Chest Pain     chest pains, shortness of breath off and on for years     Frequency     urine frequency 3-4 weeks, waking up at night to urinate     Anxiety     feeling anxious,  stopped taking anxiety medication      Diabetes     would like labs checked for his diabetes     Medication Reconciliation     pt not taking klonopin and celexa       Initial /78 (BP Location: Right arm, Cuff Size: Adult Large)  Pulse 77  Temp 98.2  F (36.8  C) (Tympanic)  Ht 5' 11\" (1.803 m)  Wt 260 lb 4.8 oz (118.1 kg)  BMI 36.3 kg/m2 Estimated body mass index is 36.3 kg/(m^2) as calculated from the following:    Height as of this encounter: 5' 11\" (1.803 m).    Weight as of this encounter: 260 lb 4.8 oz (118.1 kg).  Medication Reconciliation: complete    "

## 2017-06-12 ENCOUNTER — HOSPITAL ENCOUNTER (OUTPATIENT)
Facility: CLINIC | Age: 47
Setting detail: OBSERVATION
Discharge: HOME | End: 2017-06-12
Attending: EMERGENCY MEDICINE | Admitting: INTERNAL MEDICINE
Payer: COMMERCIAL

## 2017-06-12 ENCOUNTER — APPOINTMENT (OUTPATIENT)
Dept: GENERAL RADIOLOGY | Facility: CLINIC | Age: 47
End: 2017-06-12
Attending: PHYSICIAN ASSISTANT
Payer: COMMERCIAL

## 2017-06-12 ENCOUNTER — APPOINTMENT (OUTPATIENT)
Dept: NUCLEAR MEDICINE | Facility: CLINIC | Age: 47
End: 2017-06-12
Attending: PHYSICIAN ASSISTANT
Payer: COMMERCIAL

## 2017-06-12 VITALS
BODY MASS INDEX: 36.82 KG/M2 | TEMPERATURE: 98 F | DIASTOLIC BLOOD PRESSURE: 72 MMHG | RESPIRATION RATE: 16 BRPM | WEIGHT: 263.01 LBS | OXYGEN SATURATION: 98 % | SYSTOLIC BLOOD PRESSURE: 115 MMHG | HEIGHT: 71 IN

## 2017-06-12 DIAGNOSIS — R07.9 ACUTE CHEST PAIN: ICD-10-CM

## 2017-06-12 LAB
ALBUMIN SERPL-MCNC: 3.5 G/DL (ref 3.4–5)
ALP SERPL-CCNC: 57 U/L (ref 40–150)
ALT SERPL W P-5'-P-CCNC: 55 U/L (ref 0–70)
ANION GAP SERPL CALCULATED.3IONS-SCNC: 10 MMOL/L (ref 3–14)
AST SERPL W P-5'-P-CCNC: 30 U/L (ref 0–45)
BASOPHILS # BLD AUTO: 0 10E9/L (ref 0–0.2)
BASOPHILS NFR BLD AUTO: 0.8 %
BILIRUB SERPL-MCNC: 0.4 MG/DL (ref 0.2–1.3)
BUN SERPL-MCNC: 20 MG/DL (ref 7–30)
CALCIUM SERPL-MCNC: 8.7 MG/DL (ref 8.5–10.1)
CHLORIDE SERPL-SCNC: 107 MMOL/L (ref 94–109)
CO2 SERPL-SCNC: 23 MMOL/L (ref 20–32)
CREAT SERPL-MCNC: 0.75 MG/DL (ref 0.66–1.25)
DIFFERENTIAL METHOD BLD: NORMAL
EOSINOPHIL # BLD AUTO: 0.1 10E9/L (ref 0–0.7)
EOSINOPHIL NFR BLD AUTO: 2 %
ERYTHROCYTE [DISTWIDTH] IN BLOOD BY AUTOMATED COUNT: 12 % (ref 10–15)
GFR SERPL CREATININE-BSD FRML MDRD: ABNORMAL ML/MIN/1.7M2
GLUCOSE BLDC GLUCOMTR-MCNC: 165 MG/DL (ref 70–99)
GLUCOSE BLDC GLUCOMTR-MCNC: 210 MG/DL (ref 70–99)
GLUCOSE BLDC GLUCOMTR-MCNC: 221 MG/DL (ref 70–99)
GLUCOSE SERPL-MCNC: 230 MG/DL (ref 70–99)
HCT VFR BLD AUTO: 41.2 % (ref 40–53)
HGB BLD-MCNC: 14.6 G/DL (ref 13.3–17.7)
IMM GRANULOCYTES # BLD: 0 10E9/L (ref 0–0.4)
IMM GRANULOCYTES NFR BLD: 0.3 %
LYMPHOCYTES # BLD AUTO: 0.9 10E9/L (ref 0.8–5.3)
LYMPHOCYTES NFR BLD AUTO: 22.2 %
MCH RBC QN AUTO: 31.5 PG (ref 26.5–33)
MCHC RBC AUTO-ENTMCNC: 35.4 G/DL (ref 31.5–36.5)
MCV RBC AUTO: 89 FL (ref 78–100)
MONOCYTES # BLD AUTO: 0.6 10E9/L (ref 0–1.3)
MONOCYTES NFR BLD AUTO: 13.9 %
NEUTROPHILS # BLD AUTO: 2.4 10E9/L (ref 1.6–8.3)
NEUTROPHILS NFR BLD AUTO: 60.8 %
NT-PROBNP SERPL-MCNC: 8 PG/ML (ref 0–450)
PLATELET # BLD AUTO: 162 10E9/L (ref 150–450)
POTASSIUM SERPL-SCNC: 4 MMOL/L (ref 3.4–5.3)
PROT SERPL-MCNC: 7 G/DL (ref 6.8–8.8)
RBC # BLD AUTO: 4.64 10E12/L (ref 4.4–5.9)
SODIUM SERPL-SCNC: 140 MMOL/L (ref 133–144)
TROPONIN I SERPL-MCNC: NORMAL UG/L (ref 0–0.04)
TROPONIN I SERPL-MCNC: NORMAL UG/L (ref 0–0.04)
WBC # BLD AUTO: 4 10E9/L (ref 4–11)

## 2017-06-12 PROCEDURE — 93018 CV STRESS TEST I&R ONLY: CPT | Performed by: INTERNAL MEDICINE

## 2017-06-12 PROCEDURE — 93017 CV STRESS TEST TRACING ONLY: CPT

## 2017-06-12 PROCEDURE — 78452 HT MUSCLE IMAGE SPECT MULT: CPT

## 2017-06-12 PROCEDURE — 93005 ELECTROCARDIOGRAM TRACING: CPT | Mod: XU

## 2017-06-12 PROCEDURE — 99285 EMERGENCY DEPT VISIT HI MDM: CPT | Mod: 25 | Performed by: EMERGENCY MEDICINE

## 2017-06-12 PROCEDURE — G0378 HOSPITAL OBSERVATION PER HR: HCPCS

## 2017-06-12 PROCEDURE — 25000132 ZZH RX MED GY IP 250 OP 250 PS 637: Performed by: EMERGENCY MEDICINE

## 2017-06-12 PROCEDURE — 93010 ELECTROCARDIOGRAM REPORT: CPT | Performed by: EMERGENCY MEDICINE

## 2017-06-12 PROCEDURE — 85025 COMPLETE CBC W/AUTO DIFF WBC: CPT | Performed by: EMERGENCY MEDICINE

## 2017-06-12 PROCEDURE — 99285 EMERGENCY DEPT VISIT HI MDM: CPT | Mod: 25

## 2017-06-12 PROCEDURE — 00000146 ZZHCL STATISTIC GLUCOSE BY METER IP

## 2017-06-12 PROCEDURE — 99220 ZZC INITIAL OBSERVATION CARE,LEVL III: CPT | Performed by: PHYSICIAN ASSISTANT

## 2017-06-12 PROCEDURE — 96372 THER/PROPH/DIAG INJ SC/IM: CPT

## 2017-06-12 PROCEDURE — 36415 COLL VENOUS BLD VENIPUNCTURE: CPT | Performed by: EMERGENCY MEDICINE

## 2017-06-12 PROCEDURE — 84484 ASSAY OF TROPONIN QUANT: CPT | Mod: 91 | Performed by: EMERGENCY MEDICINE

## 2017-06-12 PROCEDURE — 93016 CV STRESS TEST SUPVJ ONLY: CPT | Performed by: INTERNAL MEDICINE

## 2017-06-12 PROCEDURE — 80053 COMPREHEN METABOLIC PANEL: CPT | Performed by: EMERGENCY MEDICINE

## 2017-06-12 PROCEDURE — A9502 TC99M TETROFOSMIN: HCPCS | Performed by: INTERNAL MEDICINE

## 2017-06-12 PROCEDURE — 78452 HT MUSCLE IMAGE SPECT MULT: CPT | Mod: 26 | Performed by: INTERNAL MEDICINE

## 2017-06-12 PROCEDURE — 25000131 ZZH RX MED GY IP 250 OP 636 PS 637: Performed by: PHYSICIAN ASSISTANT

## 2017-06-12 PROCEDURE — 34300033 ZZH RX 343: Performed by: INTERNAL MEDICINE

## 2017-06-12 PROCEDURE — 83880 ASSAY OF NATRIURETIC PEPTIDE: CPT | Performed by: EMERGENCY MEDICINE

## 2017-06-12 PROCEDURE — 71010 XR CHEST PORT 1 VW: CPT

## 2017-06-12 RX ORDER — ALUMINA, MAGNESIA, AND SIMETHICONE 2400; 2400; 240 MG/30ML; MG/30ML; MG/30ML
15-30 SUSPENSION ORAL EVERY 4 HOURS PRN
Status: DISCONTINUED | OUTPATIENT
Start: 2017-06-12 | End: 2017-06-12 | Stop reason: HOSPADM

## 2017-06-12 RX ORDER — LIDOCAINE 40 MG/G
CREAM TOPICAL
Status: DISCONTINUED | OUTPATIENT
Start: 2017-06-12 | End: 2017-06-12 | Stop reason: HOSPADM

## 2017-06-12 RX ORDER — NITROGLYCERIN 0.4 MG/1
0.4 TABLET SUBLINGUAL EVERY 5 MIN PRN
Status: DISCONTINUED | OUTPATIENT
Start: 2017-06-12 | End: 2017-06-12 | Stop reason: HOSPADM

## 2017-06-12 RX ORDER — NICOTINE POLACRILEX 4 MG
15-30 LOZENGE BUCCAL
Status: DISCONTINUED | OUTPATIENT
Start: 2017-06-12 | End: 2017-06-12 | Stop reason: HOSPADM

## 2017-06-12 RX ORDER — ACETAMINOPHEN 500 MG
1000 TABLET ORAL ONCE
Status: COMPLETED | OUTPATIENT
Start: 2017-06-12 | End: 2017-06-12

## 2017-06-12 RX ORDER — NITROGLYCERIN 0.4 MG/1
0.4 TABLET SUBLINGUAL EVERY 5 MIN PRN
Status: DISCONTINUED | OUTPATIENT
Start: 2017-06-12 | End: 2017-06-12

## 2017-06-12 RX ORDER — LISINOPRIL 2.5 MG/1
2.5 TABLET ORAL DAILY
Status: DISCONTINUED | OUTPATIENT
Start: 2017-06-12 | End: 2017-06-12 | Stop reason: HOSPADM

## 2017-06-12 RX ORDER — ACETAMINOPHEN 650 MG/1
650 SUPPOSITORY RECTAL EVERY 4 HOURS PRN
Status: DISCONTINUED | OUTPATIENT
Start: 2017-06-12 | End: 2017-06-12 | Stop reason: HOSPADM

## 2017-06-12 RX ORDER — DEXTROSE MONOHYDRATE 25 G/50ML
25-50 INJECTION, SOLUTION INTRAVENOUS
Status: DISCONTINUED | OUTPATIENT
Start: 2017-06-12 | End: 2017-06-12 | Stop reason: HOSPADM

## 2017-06-12 RX ORDER — ROSUVASTATIN CALCIUM 20 MG/1
40 TABLET, COATED ORAL DAILY
Status: DISCONTINUED | OUTPATIENT
Start: 2017-06-12 | End: 2017-06-12 | Stop reason: HOSPADM

## 2017-06-12 RX ORDER — NALOXONE HYDROCHLORIDE 0.4 MG/ML
.1-.4 INJECTION, SOLUTION INTRAMUSCULAR; INTRAVENOUS; SUBCUTANEOUS
Status: DISCONTINUED | OUTPATIENT
Start: 2017-06-12 | End: 2017-06-12 | Stop reason: HOSPADM

## 2017-06-12 RX ORDER — ACETAMINOPHEN 325 MG/1
650 TABLET ORAL EVERY 4 HOURS PRN
Status: DISCONTINUED | OUTPATIENT
Start: 2017-06-12 | End: 2017-06-12 | Stop reason: HOSPADM

## 2017-06-12 RX ADMIN — NITROGLYCERIN 0.4 MG: 0.4 TABLET SUBLINGUAL at 05:10

## 2017-06-12 RX ADMIN — RANITIDINE HYDROCHLORIDE 150 MG: 150 TABLET, FILM COATED ORAL at 10:24

## 2017-06-12 RX ADMIN — ACETAMINOPHEN 1000 MG: 500 TABLET ORAL at 06:40

## 2017-06-12 RX ADMIN — ASPIRIN 325 MG: 325 TABLET, COATED ORAL at 05:09

## 2017-06-12 RX ADMIN — TETROFOSMIN 11.6 MCI.: 1.38 INJECTION, POWDER, LYOPHILIZED, FOR SOLUTION INTRAVENOUS at 09:00

## 2017-06-12 RX ADMIN — TETROFOSMIN 37.7 MCI.: 1.38 INJECTION, POWDER, LYOPHILIZED, FOR SOLUTION INTRAVENOUS at 11:15

## 2017-06-12 RX ADMIN — NITROGLYCERIN 0.4 MG: 0.4 TABLET SUBLINGUAL at 05:18

## 2017-06-12 RX ADMIN — INSULIN ASPART 3 UNITS: 100 INJECTION, SOLUTION INTRAVENOUS; SUBCUTANEOUS at 09:09

## 2017-06-12 ASSESSMENT — ENCOUNTER SYMPTOMS
PALPITATIONS: 0
LIGHT-HEADEDNESS: 1
WEAKNESS: 0
HEADACHES: 1
APPETITE CHANGE: 0
FATIGUE: 1
NECK STIFFNESS: 1
DYSURIA: 0
NAUSEA: 0
VOMITING: 0
WOUND: 0
CHEST TIGHTNESS: 0
COUGH: 0
FEVER: 0
BACK PAIN: 0
NUMBNESS: 0
CHILLS: 0
SHORTNESS OF BREATH: 1
ABDOMINAL PAIN: 0

## 2017-06-12 NOTE — PROGRESS NOTES
Nuclear stress test prelim report    9:30 on [employer] protocol  87% target HR achieved  EKG negative for ischemia  No chest pain at baseline, 6/10 chest pain with stress that resolved shortly in recovery  Normal perfusion, no ischemia or infarct  EF 55%, no WMA.    Full report to follow when network is back up.    Tim Mora MD  Cardiology - [facility]  Pager: [phone]  Text Page  June 12, 2017

## 2017-06-12 NOTE — H&P
[facility]    History and Physical  Hospital Medicine       Date of Admission:  6/12/2017  Date of Service: 6/12/2017     Assessment & Plan   Phi Ortiz Jr. is a 46 year old male with PMH significant for T2DM, CAD, HTN, HLD, and GERD who now presents with acute, exertional chest pain.      Acute Chest Pain   DDx: cardiac origin (unstable angina) versus pulmonary origin (pneumonia) versus GI origin (known GERD) versus musculoskeletal origin  Troponin negative, EKG negative x1 on admit, CXR was not done in ED.  Received aspirin, Zantac and nitro x2 in the ED.  Had cardiac cath in 2014 which showed diffuse CAD, none of which was stentable/severe enough to stent at that point in time.    - order portable CXR, rule out pulmonary origin  - order GI cocktail  - continue telemetry monitoring  - EKG PRN for chest pain   - order serial troponin Q6  - cardiac testing (NM MPI exercise stress)  - order Morphine and nitroglycerin PRN for pain    - cardiac and diabetic diet, No caffeine    CAD  Last Cath 2014 revealed 30% stenosis to the LAD.  No stents were placed - follows with [facility] [place]  - continue PTA aspirin  - recommend outpatient cardiology follow up (has not seen since 04/2016).    Type 2 diabetes mellitus without complications (H)  A1c 7.0 06/02.  - hold home metformin today given stress test  - start high SSI      Essential hypertension, benign  BP reviewed, stable  - continue PTA lisinopril      GERD (gastroesophageal reflux disease)  Started on Zantac in ED.  - continue Zantac  - hold home protonix    Hypertriglyceridemia  Compensated  - continue PTA Crestor       F: not indicated  E: not indicated  N: diabetic and cardiac diet without caffeine  DVT Prophylaxis: not indicated    Code Status: Full Code    Disposition: Anticipate discharge in 1 days. Appropriate for observation care.    Case discussed with Dr. Tolu Hernadez.  Assessment and plan as written above.    Mary Zelaya,  "CORINA  [facility]    History is obtained from the patient and review of the EMR.    Past Medical History    Past Medical History:   Diagnosis Date     Anxiety      Chest pain 1/27/2014     GERD (gastroesophageal reflux disease)        Past Surgical History   Past Surgical History:   Procedure Laterality Date     NO HISTORY OF SURGERY         Family History    Family History   Problem Relation Age of Onset     C.A.D. Father      heart attack 30's     CEREBROVASCULAR DISEASE Father      DIABETES Father      Hypertension Father      Hypertension Mother      GASTROINTESTINAL DISEASE Mother      colitis     DIABETES Maternal Grandmother      snores     C.A.D. Maternal Grandfather      MI, leg circulation problems     C.A.D. Paternal Grandfather      MI in 30s, snores     Neurologic Disorder Sister      vertigo.  fibromyalgia     Hyperlipidemia Daughter      Breast Cancer No family hx of      Cancer - colorectal No family hx of      Prostate Cancer No family hx of        History of Present Illness   Phi Ortiz Jr. is a 46 year old male with PMH significant for T2DM, HTN, HLD, and GERD who now presents with acute, exertional chest pain.    The patient reports chronic, intermittent chest pain which is \"sharp\" in character over the last two years.  HE is followed by [facility] for this and known CAD (last cath 2014 with 30% stenosis to LAD).  He reports that last evening he awoke with 6/10 left sided chest pressure which was described as \"pressure and dull\" like pain; this is a deviation from his regular chest pain. He reports the pain was constant and associated with both leg weakness and SOB; he reports his regular chest pain is NOT associated with this. 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  HE reports that sublingual nitroglycerin in the ED improved the patient's pain.  He denies radiation to his arms or " "neck.  He denies associated diaphoresis, nausea, numbness/paresthesias.  He otherwise denies fever, chills, vomiting, myalgias, cold-like symptoms (congestion, pharyngitis, sinus pressure, rhinorrhea), swollen glands, headaches, dizziness, cough, wheezes, abdominal pain, diarrhea/constipation, dysuria, hematuria, joint swelling, joint pain, associates to his job (factory work).    On the morning of admission, the patient reports he has had intermittent chest pain since administration of nitroglycerin; however, he notes that this is sharp in character and more akin to his chronic chest pain rather than the chest \"pressure\" he experienced yesterday evening.    The patient reports he's been under significant stress as he is  from his wife.  He has an extremely strong family history of CAD as both his father and father's father had MIs in the 30s.  He has significant risk factors in that he is a type 2 diabetic, has HTN and HLD.  He is a never smoker.  Repots intermittent EtOH.      Prior to Admission Medications   Prior to Admission Medications   Prescriptions Last Dose Informant Patient Reported? Taking?   ASPIRIN EC PO 6/11/2017 at am  Yes Yes   Sig: Take 325 mg by mouth daily   ORDER FOR DME, SET TO FAX,   Yes No   Sig: Phi was set up on [employer]  Type [employer] Auto (System One 60 series)  Serial Number: [id number] Modem Number: [id number] Pressure: AUTO-TITRATE CPAP 7-12 cm H20 Mask of choice: AIRFIT F 10 FULL FACE MASK Size: MEDIUM HEATED TUBING AND MODEM PROVIDED   albuterol (PROAIR HFA, PROVENTIL HFA, VENTOLIN HFA) 108 (90 BASE) MCG/ACT inhaler Unknown at Unknown time  No No   Sig: Inhale 2 puffs into the lungs every 6 hours as needed for shortness of breath / dyspnea or wheezing   blood glucose (ONE TOUCH DELICA) lancing device   No No   Sig: Use to test blood sugars 1 times daily or as directed.   blood glucose monitoring (ONE TOUCH ULTRA 2) meter device kit   No No   Sig: Use to " "test blood sugars 1 times daily or as directed.   blood glucose monitoring (ONE TOUCH ULTRA) test strip   No No   Sig: Use to test blood sugars 1 times daily or as directed.   lisinopril (PRINIVIL,ZESTRIL) 2.5 MG tablet 6/11/2017 at am  No Yes   Sig: Take 1 tablet (2.5 mg) by mouth daily   metFORMIN (GLUCOPHAGE-XR) 500 MG 24 hr tablet 6/11/2017 at am  No Yes   Sig: Take 1 tablet (500 mg) by mouth 2 times daily (with meals)   order for DME   No No   Sig: Automated blood pressure cuff.   pantoprazole (PROTONIX) 40 MG EC tablet 6/11/2017 at am  No Yes   Sig: Take 1 tablet (40 mg) by mouth daily Take 30-60 minutes before a meal.   rosuvastatin (CRESTOR) 40 MG tablet 6/11/2017 at am  No Yes   Sig: Take 1 tablet (40 mg) by mouth daily      Facility-Administered Medications: None       Allergies   No Known Allergies    Social History   Social History     Social History     Marital status:      Spouse name: N/A     Number of children: N/A     Years of education: N/A     Occupational History     Not on file.     Social History Main Topics     Smoking status: Never Smoker     Smokeless tobacco: Former User     Alcohol use Yes      Comment: 24 pack a month, intermittent drinks based off the shift (1st, 2nd or 3rd) he's working that week.     Drug use: No     Sexual activity: Yes     Partners: Female     Other Topics Concern     Parent/Sibling W/ Cabg, Mi Or Angioplasty Before 65f 55m? Yes     Social History Narrative       ROS: 10 point ROS neg other than the symptoms noted above in the HPI.    Physical Exam     /72  Temp 98  F (36.7  C) (Oral)  Resp 16  Ht 1.803 m (5' 11\")  Wt 119.3 kg (263 lb 0.1 oz)  SpO2 98%  BMI 36.68 kg/m2     Weight: 263 lbs .14 oz Body mass index is 36.68 kg/(m^2).     Constitutional: Alert and oriented x4.  Cooperative.  Appear stated age.  Appears in no acute distress.   HEENT: Oropharynx is clear and moist. No evidence of cranial trauma.  Lymph/Hematologic: No occipital, " submental, submandibular, anterior or posterior cervical, or supraclavicular lymphadenopathy is appreciated.  Cardiovascular: Regular rate/ rhythm.  S1 and S2 grossly normal.  No appreciable murmur, rub, gallop. No lower extremity edema.  Respiratory: Clear to auscultation bilaterally. Equal chest expansion.  GI: Soft, non-tender, normal bowel sounds, no hepatosplenomegaly.  Genitourinary: Deferred  Musculoskeletal: Normal muscle bulk and tone.  Skin: Warm and dry, no rashes.   Neurologic: Neck supple. Cranial nerves 3-12 are grossly intact.  is symmetric.     Data   Data reviewed today:     Recent Labs  Lab 06/12/17  0434   WBC 4.0   HGB 14.6   MCV 89         POTASSIUM 4.0   CHLORIDE 107   CO2 23   BUN 20   CR 0.75   ANIONGAP 10   SIENNA 8.7   *   ALBUMIN 3.5   PROTTOTAL 7.0   BILITOTAL 0.4   ALKPHOS 57   ALT 55   AST 30   TROPI <0.015The 99th percentile for upper reference range is 0.045 ug/L.  Troponin values in the range of 0.045 - 0.120 ug/L may be associated with risks of adverse clinical events.       Recent Results (from the past 24 hour(s))   XR Chest Port 1 View    Narrative    XR CHEST PORT 1 VW 6/12/2017 8:25 AM     HISTORY: chest pain      Impression    IMPRESSION: The lungs appear clear. No pleural effusions.    PAWAN QUIÑONEZ MD       I personally reviewed the EKG tracing showing no acute ST changes or T-wave inversions.    Mary Zelaya PAJHON  [facility]

## 2017-06-12 NOTE — ED NOTES
patient having upper left chest discomfort which feels achy and someone sitting on his chest.  Started about 2300 last evening

## 2017-06-12 NOTE — PLAN OF CARE
Problem: Goal Outcome Summary  Goal: Goal Outcome Summary  Outcome: Completed Date Met:  06/12/17  DANIEL RAMEY DISCHARGE NOTE     Patient discharged to home at 5:09 PM via ambulation. Accompanied by spouse and staff. Discharge instructions reviewed with patient, opportunity offered to ask questions. Prescriptions - None ordered for discharge. All belongings sent with patient.     Andreea Vázquez

## 2017-06-12 NOTE — ED PROVIDER NOTES
History     Chief Complaint   Patient presents with     Chest Pain     patient having upper left chest discomfort which feels achy and someone sitting on his chest.  Started about 2300 last evening     HPI  Phi Rothman Jr. is a 46 year old male with a history of hypertension, obesity, diabetes, hyperlipidemia, and coronary artery disease presents for evaluation of chest pressure tonight.  Patient reports intermittent episodes of chest pressure associated with dyspnea and some mild diaphoresis over the past few weeks.  Symptoms more prominent with exertion and seemed to resolve with rest.  No associated nausea.  Patient does have history of coronary artery disease and saw his primary care provider for reassessment acute weeks ago and is scheduled for an outpatient stress test this week.  Patient denies any significant cough or fever.  He does report he has had a history of chemical exposures at work to multiple acidic gasses but no recent exposure.  Patient was wondering whether he may have some pulmonary disease.  He is a former cigar smoker but quit about 10 years ago.  Denies history of blood clots.  No recent trips or airline travel.  No history of recent trauma or surgery or immobility.    I have reviewed the Medications, Allergies, Past Medical and Surgical History, and Social History in the Epic system.    Chart review:    Cardiac cath: 1/30/14: Mild diffuse atherosclerotic disease with 30% occlusion of the LAD  Study Result   GATED EXERCISE MYOCARDIAL PERFUSION SCINTIGRAPHY       1/20/2014 11:14 AM ,PHI ROTHMAN  43 years  Male. 1970        Indication/Clinical History: Chest pain     Impression     I    Exercise Test       1.  The patient exercised to a adequate cardiac workload with                  Average functional aerobic capacity demonstrated.       2. The patient experienced left arm pain and bilateral calf pain  during the test.       3. EKG report done under separate cover.     II     Myocardial Perfusion Scintigraphy  1.  Myocardial perfusion imaging using single isotope technique  demonstrated mild reversibility in the anterior wall suggesting  possible mild ischemia.   2. Gated images demonstrated a normal LV cavity size and systolic  function.  The left ventricular systolic function is 55%.  3. Compared to the prior study from no previous study .       Allergies: No Known Allergies      No current facility-administered medications on file prior to encounter.   Current Outpatient Prescriptions on File Prior to Encounter:  ASPIRIN EC PO Take 325 mg by mouth daily   pantoprazole (PROTONIX) 40 MG EC tablet Take 1 tablet (40 mg) by mouth daily Take 30-60 minutes before a meal.   metFORMIN (GLUCOPHAGE-XR) 500 MG 24 hr tablet Take 1 tablet (500 mg) by mouth 2 times daily (with meals)   rosuvastatin (CRESTOR) 40 MG tablet Take 1 tablet (40 mg) by mouth daily   lisinopril (PRINIVIL,ZESTRIL) 2.5 MG tablet Take 1 tablet (2.5 mg) by mouth daily   order for DME Automated blood pressure cuff.   albuterol (PROAIR HFA, PROVENTIL HFA, VENTOLIN HFA) 108 (90 BASE) MCG/ACT inhaler Inhale 2 puffs into the lungs every 6 hours as needed for shortness of breath / dyspnea or wheezing   blood glucose monitoring (ONE TOUCH ULTRA 2) meter device kit Use to test blood sugars 1 times daily or as directed.   blood glucose (ONE TOUCH DELICA) lancing device Use to test blood sugars 1 times daily or as directed.   blood glucose monitoring (ONE TOUCH ULTRA) test strip Use to test blood sugars 1 times daily or as directed.   ORDER FOR DME, SET TO FAXPhi was set up on [employer]  Type [employer] Auto (System One 60 series)  Serial Number: [id number] Modem Number: [id number] Pressure: AUTO-TITRATE CPAP 7-12 cm H20 Mask of choice: AIRFIT F 10 FULL FACE MASK Size: MEDIUM HEATED TUBING AND MODEM PROVIDED       Patient Active Problem List   Diagnosis     GERD (gastroesophageal reflux disease)     Generalized anxiety  "disorder     Fatty liver     Family history of coronary artery disease     Hypertriglyceridemia     Vitamin D deficiency     Chest pain     Chronic ischemic heart disease     Hyperlipidemia LDL goal <70     Type 2 diabetes mellitus without complications (H)     Mild major depression (H)     overweight BMI greater than 35     Essential hypertension, benign       Past Surgical History:   Procedure Laterality Date     NO HISTORY OF SURGERY         Social History   Substance Use Topics     Smoking status: Never Smoker     Smokeless tobacco: Former User     Alcohol use Yes      Comment: 6 pack a month       Most Recent Immunizations   Administered Date(s) Administered     Hepatitis A Vac Ped/Adol-2 Dose 04/18/2014     Hepatitis B 04/18/2014     Influenza (IIV3) 10/23/2015     Influenza Vaccine IM 3yrs+ 4 Valent IIV4 10/06/2016     Pneumococcal (PCV 13) 11/16/2015     TDAP Vaccine (Adacel) 09/17/2010       BMI: Estimated body mass index is 36.26 kg/(m^2) as calculated from the following:    Height as of this encounter: 1.803 m (5' 11\").    Weight as of this encounter: 117.9 kg (260 lb).      Review of Systems   Constitutional: Positive for fatigue. Negative for appetite change, chills and fever.   HENT: Negative for congestion.    Respiratory: Positive for shortness of breath. Negative for cough and chest tightness.    Cardiovascular: Positive for chest pain. Negative for palpitations and leg swelling.   Gastrointestinal: Negative for abdominal pain, nausea and vomiting.   Genitourinary: Negative for dysuria.   Musculoskeletal: Positive for neck stiffness. Negative for back pain.   Skin: Negative for rash and wound.   Neurological: Positive for light-headedness and headaches. Negative for weakness and numbness.   All other systems reviewed and are negative.      Physical Exam   BP: 131/86  Heart Rate: 74  Temp: 98  F (36.7  C)  Resp: 16  Height: 180.3 cm (5' 11\")  Weight: 117.9 kg (260 lb)  SpO2: 97 %  Physical Exam "   Constitutional: He is oriented to person, place, and time. He appears well-developed and well-nourished. No distress.   HENT:   Head: Normocephalic and atraumatic.   Eyes: Pupils are equal, round, and reactive to light.   Neck: Normal range of motion. Neck supple.   Cardiovascular: Normal rate and regular rhythm.    No murmur heard.  Pulmonary/Chest: Effort normal and breath sounds normal. He has no rales.   Abdominal: Soft. Bowel sounds are normal. There is no tenderness.   Musculoskeletal: Normal range of motion. He exhibits no edema or tenderness.   Neurological: He is alert and oriented to person, place, and time.   Skin: Skin is warm and dry. He is not diaphoretic.   Psychiatric: He has a normal mood and affect.   Nursing note and vitals reviewed.      ED Course     ED Course     Procedures             EKG Interpretation:      Interpreted by Sal Linares  Time reviewed: 0430  Symptoms at time of EKG: Chest pain   Rhythm: normal sinus   Rate: Normal  Axis: Normal  Ectopy: none  Conduction: normal  ST Segments/ T Waves: No acute ischemic changes  Q Waves: none  Comparison to prior: Not significantly changed compared to EKG 6/2/17    Clinical Impression: Sinus rhythm without acute ischemic abnormalities and unchanged from previous           Labs Ordered and Resulted from Time of ED Arrival Up to the Time of Departure from the ED   COMPREHENSIVE METABOLIC PANEL - Abnormal; Notable for the following:        Result Value    Glucose 230 (*)     All other components within normal limits   CBC WITH PLATELETS DIFFERENTIAL   TROPONIN I   NT PROBNP INPATIENT        WELLS PE SCORE for Pulmonary Embolism likelihood (calculator)  Background  Calculates likelihood of PE based on 7 criteria including suspected DVT, HR>100, recent surgery or immobilization, prior VTE, hemoptysis, active cancer.  Data  has GERD (gastroesophageal reflux disease); Generalized anxiety disorder; Fatty liver; Family history of coronary  artery disease; Hypertriglyceridemia; Vitamin D deficiency; Chest pain; Chronic ischemic heart disease; Hyperlipidemia LDL goal <70; Type 2 diabetes mellitus without complications (H); Mild major depression (H); overweight BMI greater than 35; and Essential hypertension, benign on his problem list.   has a past surgical history that includes no history of surgery.     Criteria   Of possible 12.5 points for 7 possible items:  NEGATIVE  Interpretation  Wells PE Score: 0  Score <=4 AND Negative D-Dimer: Low PE Probability (3% risk)          PERC Rule for risk stratifying PE to low risk (calculator)  Background  Risk stratifies patients to low risk of PE if all 8 criteria are present including age <50, heart rate <100, O2 Sat >94%, no unilateral leg edema, no hemoptysis, no recent surgery or trauma, no prior VTE, and no hormone use.  Data  46 year old  has GERD (gastroesophageal reflux disease); Generalized anxiety disorder; Fatty liver; Family history of coronary artery disease; Hypertriglyceridemia; Vitamin D deficiency; Chest pain; Chronic ischemic heart disease; Hyperlipidemia LDL goal <70; Type 2 diabetes mellitus without complications (H); Mild major depression (H); overweight BMI greater than 35; and Essential hypertension, benign on his problem list.  @cmedp@   has a past surgical history that includes no history of surgery.     SpO2: 97 %  Criteria   Of  8 possible items (all criteria must be present):  Age <50 years  Heart rate <100 bpm  Oxygen Saturation >94%  No unilateral leg swelling  No hemoptysis  No surgery or trauma within 4 weeks  No prior DVT or PE  No hormone use (oral, transdermal and intravaginal estrogens)  Interpretation  All eight criteria are met AND low clinical PE suspicion: No further evaluation for PE required        5:32 AM: Patient did get relief of his chest pain after the 2nd nitroglycerin but did also get a headache.  Troponin and BNP normal.  Glucose elevated at 230.    5:36 AM: PT  re-assessed. CP gone. HA improving. Discussed results and plan for observation admission for further CP devial.     5:38 AM :Discussed with Dr Rodriguez. Accepts for admission.     Assessments & Plan (with Medical Decision Making)  46-year-old male with history of hypertension, hyperlipidemia, diabetes, and coronary artery disease as well as obesity presents for evaluation of ongoing exertional chest discomfort symptoms associated with dyspnea, diaphoresis.  Symptoms improved with nitroglycerin in the emergency department.  Initial EKG and troponin negative.  Patient given aspirin in the emergency department. Recommended observation admission for stress test today.       I have reviewed the nursing notes.    I have reviewed the findings, diagnosis, plan and need for follow up with the patient.       New Prescriptions    No medications on file       Final diagnoses:   Acute chest pain       6/12/2017   [facility] EMERGENCY DEPARTMENT     Linares, Sal Richardson MD  06/12/17 0512

## 2017-06-12 NOTE — PROGRESS NOTES
"[facility] ADMISSION NOTE    Patient admitted to room 2309 at approximately 0500 via cart from emergency room. Patient was accompanied by transport tech.     Verbal SBAR report received from Anna prior to patient arrival.     Patient ambulated to bed independently. Patient alert and oriented X 3. Pain is controlled with current analgesics.  Medication(s) being used: acetaminophen. 0-10 Pain Scale: 6. Admission vital signs: Blood pressure 115/72, temperature 98  F (36.7  C), temperature source Oral, resp. rate 16, height 1.803 m (5' 11\"), weight 119.3 kg (263 lb 0.1 oz), SpO2 98 %. Patient was oriented to plan of care, call light, bed controls, tv, telephone, bathroom and visiting hours.     The following safety risks were identified during admission: none. Yellow risk band applied: MILIND Pereira    "

## 2017-06-12 NOTE — ED NOTES
"Patient has  D Lo to Observation  order. Patient has been given the Observation brochure -  What does Observation mean to me.\"  Patient has been given the opportunity to ask questions about observation status and their plan of care.      Jocelyne Bautista  "

## 2017-06-14 ENCOUNTER — OFFICE VISIT (OUTPATIENT)
Dept: FAMILY MEDICINE | Facility: CLINIC | Age: 47
End: 2017-06-14
Payer: COMMERCIAL

## 2017-06-14 VITALS
WEIGHT: 259 LBS | TEMPERATURE: 98.1 F | OXYGEN SATURATION: 97 % | HEIGHT: 71 IN | DIASTOLIC BLOOD PRESSURE: 94 MMHG | BODY MASS INDEX: 36.26 KG/M2 | SYSTOLIC BLOOD PRESSURE: 139 MMHG | HEART RATE: 91 BPM

## 2017-06-14 DIAGNOSIS — M25.50 PAIN IN JOINT, MULTIPLE SITES: ICD-10-CM

## 2017-06-14 DIAGNOSIS — R07.89 ATYPICAL CHEST PAIN: Primary | ICD-10-CM

## 2017-06-14 PROCEDURE — 36415 COLL VENOUS BLD VENIPUNCTURE: CPT | Performed by: FAMILY MEDICINE

## 2017-06-14 PROCEDURE — 99214 OFFICE O/P EST MOD 30 MIN: CPT | Performed by: FAMILY MEDICINE

## 2017-06-14 PROCEDURE — 86618 LYME DISEASE ANTIBODY: CPT | Performed by: FAMILY MEDICINE

## 2017-06-14 ASSESSMENT — PAIN SCALES - GENERAL: PAINLEVEL: EXTREME PAIN (8)

## 2017-06-14 NOTE — LETTER
[facility]  [address] [facility] [address]  Phone: [phone]    June 14, 2017        Phi Otriz Jr.  [address]  [place] [address]          To whom it may concern:    RE: Phi Ortiz Jr.    Patient may return to work 6/18/2017  with the following:  No working or lifting restrictions    Please contact me for questions or concerns.      Sincerely,        Zhao Crook MD

## 2017-06-14 NOTE — PROGRESS NOTES
Notes Recorded by Zhao Crook MD on 6/15/2017 at 4:48 PM  Please call the patient with the results. Notify that chest CT looks normal no blood clot or lung problems. I think possibly anxiety contributing to his symptoms. We discussed trying anti-anxiety medication. Xanax prescription printed. Have him try that for a few doses/episodes and let me know if that's helpful or not.Prescription printed please fax to pharmacy of patient's choice.    SUBJECTIVE:                                                    Phi Ortiz Jr. is a 46 year old male who presents to clinic today for the following health issues:          Hospital Follow-up Visit:    Hospital/Nursing Home/IP Rehab Facility: [facility]  Date of Admission: 6/12/2017  Date of Discharge: 6/12/2017  Reason(s) for Admission: Acute chest Pain          Pt states that he is currently having chest pain, left side; pt states that it is worse with exertion   Dizziness, sweats, fatigued and anxiety intermittently since discharge            Problems taking medications regularly:  None       Medication changes since discharge: None       Problems adhering to non-medication therapy:  None    Summary of hospitalization:  [employer] discharge summary reviewed  Diagnostic Tests/Treatments reviewed.  Follow up needed: none  Other Healthcare Providers Involved in Patient s Care:         None  Update since discharge: improved.     Post Discharge Medication Reconciliation: discharge medications reconciled, continue medications without change.  Plan of care communicated with patient     Coding guidelines for this visit:  Type of Medical   Decision Making Face-to-Face Visit       within 7 Days of discharge Face-to-Face Visit        within 14 days of discharge   Moderate Complexity [id number]   High Complexity [id number]          ADDITIONAL HPI: 46 year old male here for above issue.  Has also had multiple tick bites. concerned about possible  lyme. Has noticed achy joints. No notable rash.    ROS: 10 point review of systems negative except as per HPI.    PAST MEDICAL HISTORY:  Past Medical History:   Diagnosis Date     Anxiety      Chest pain 1/27/2014     GERD (gastroesophageal reflux disease)         ACTIVE MEDICAL PROBLEMS:  Patient Active Problem List   Diagnosis     GERD (gastroesophageal reflux disease)     Generalized anxiety disorder     Fatty liver     Family history of coronary artery disease     Hypertriglyceridemia     Vitamin D deficiency     Chest pain     Chronic ischemic heart disease     Hyperlipidemia LDL goal <70     Type 2 diabetes mellitus without complications (H)     Mild major depression (H)     overweight BMI greater than 35     Essential hypertension, benign        FAMILY HISTORY:  Family History   Problem Relation Age of Onset     C.A.D. Father      heart attack 30's     CEREBROVASCULAR DISEASE Father      DIABETES Father      Hypertension Father      Hypertension Mother      GASTROINTESTINAL DISEASE Mother      colitis     DIABETES Maternal Grandmother      snores     C.A.D. Maternal Grandfather      MI, leg circulation problems     C.A.D. Paternal Grandfather      MI in 30s, snores     Neurologic Disorder Sister      vertigo.  fibromyalgia     Hyperlipidemia Daughter      Breast Cancer No family hx of      Cancer - colorectal No family hx of      Prostate Cancer No family hx of        SOCIAL HISTORY:  Social History     Social History     Marital status:      Spouse name: N/A     Number of children: N/A     Years of education: N/A     Occupational History     Not on file.     Social History Main Topics     Smoking status: Never Smoker     Smokeless tobacco: Former User     Alcohol use Yes      Comment: 24 pack a month, intermittent drinks based off the shift (1st, 2nd or 3rd) he's working that week.     Drug use: No     Sexual activity: Yes     Partners: Female     Other Topics Concern     Parent/Sibling W/ Cabg, Mi Or  "Angioplasty Before 65f 55m? Yes     Social History Narrative       MEDICATIONS:  Current Outpatient Prescriptions   Medication Sig Dispense Refill     ASPIRIN EC PO Take 325 mg by mouth daily       pantoprazole (PROTONIX) 40 MG EC tablet Take 1 tablet (40 mg) by mouth daily Take 30-60 minutes before a meal. 30 tablet 1     metFORMIN (GLUCOPHAGE-XR) 500 MG 24 hr tablet Take 1 tablet (500 mg) by mouth 2 times daily (with meals) (Patient taking differently: Take 500 mg by mouth every evening ) 180 tablet 0     lisinopril (PRINIVIL,ZESTRIL) 2.5 MG tablet Take 1 tablet (2.5 mg) by mouth daily 90 tablet 1     order for DME Automated blood pressure cuff. 1 each 0     rosuvastatin (CRESTOR) 40 MG tablet Take 1 tablet (40 mg) by mouth daily 90 tablet 3     blood glucose monitoring (ONE TOUCH ULTRA 2) meter device kit Use to test blood sugars 1 times daily or as directed. 1 kit 0     blood glucose (ONE TOUCH DELICA) lancing device Use to test blood sugars 1 times daily or as directed. 30 each 6     blood glucose monitoring (ONE TOUCH ULTRA) test strip Use to test blood sugars 1 times daily or as directed. 1 Box 6     ORDER FOR DME, SET TO FAX, Phi was set up on [employer]  Type [employer] Auto (System One 60 series)  Serial Number: [id number] Modem Number: [id number] Pressure: AUTO-TITRATE CPAP 7-12 cm H20 Mask of choice: AIRFIT F 10 FULL FACE MASK Size: MEDIUM HEATED TUBING AND MODEM PROVIDED         ALLERGIES:   No Known Allergies    Problem list, Medication list, Allergies, and Medical/Social/Surgical histories reviewed in [facility] and updated as appropriate.    OBJECTIVE:                                                    VITALS: BP (!) 139/94 (BP Location: Right arm)  Pulse 91  Temp 98.1  F (36.7  C) (Tympanic)  Ht 5' 11\" (1.803 m)  Wt 259 lb (117.5 kg)  SpO2 97%  BMI 36.12 kg/m2 Body mass index is 36.12 kg/(m^2).  GENERAL: Pleasant, well appearing male.  HEENT: PERRL, EOMI, oropharynx clear.  NECK: " supple, no thyromegaly or thyroid masses, no lymphadenopathy.  CV: RRR, no murmurs, rubs or gallops.  LUNGS: Clear to auscultation bilaterally, normal effort.  ABDOMEN: Soft, non-distended, non-tender.  No hepatosplenomegaly or palpable masses.    SKIN: warm and dry without obvious rashes.   EXTREMITIES: No edema, normal pulses.     ASSESSMENT/PLAN:                                                    1. Atypical chest pain  Erythema work-up negative for cardiac pathology. Given pleuritic component will chest CT angio to rule out PE and also get a look at the lungs. If negative consider anxiety as possible etiology.   - CT Chest Pulmonary Embolism w Contrast; Future    2. Pain in joint, multiple sites  Will check lyme titer.  - Lyme Disease Rosa with reflex to WB Serum

## 2017-06-14 NOTE — MR AVS SNAPSHOT
After Visit Summary   6/14/2017    Phi Ortiz Jr.    MRN: [id number]           Patient Information     Date Of Birth          1970        Visit Information        Provider Department      6/14/2017 11:20 AM Zhao Crook MD [facility]        Today's Diagnoses     Atypical chest pain    -  1    Pain in joint, multiple sites           Follow-ups after your visit        Your next 10 appointments already scheduled     Jun 19, 2017  9:40 AM CDT   SHORT with Genet Ortiz NP   [facility] ([facility]    [address] [facility] [address]   [phone]            Jul 06, 2017  1:45 PM CDT   New Visit with Sal Ko MD   [facility]    [address] [facility] [address]   [phone]              Future tests that were ordered for you today     Open Future Orders        Priority Expected Expires Ordered    CT Chest Pulmonary Embolism w Contrast Routine  6/15/2018 6/14/2017            Who to contact     If you have questions or need follow up information about today's clinic visit or your schedule please contact [facility] directly at [phone].  Normal or non-critical lab and imaging results will be communicated to you by MyChart, letter or phone within 4 business days after the clinic has received the results. If you do not hear from us within 7 days, please contact the clinic through [employer] or phone. If you have a critical or abnormal lab result, we will notify you by phone as soon as possible.  Submit refill requests through [employer] or call your pharmacy and they will forward the refill request to us. Please allow 3 business days for your refill to be completed.          Additional Information About Your Visit        [employer] Information     [employer] lets you send messages to your doctor, view your test results, renew your  "prescriptions, schedule appointments and more. To sign up, go to [email or web address] . Click on \"Log in\" on the left side of the screen, which will take you to the Welcome page. Then click on \"Sign up Now\" on the right side of the page.     You will be asked to enter the access code listed below, as well as some personal information. Please follow the directions to create your username and password.     Your access code is: [email or web address]  Expires: 2017  4:48 PM     Your access code will  in 90 days. If you need help or a new code, please call your [place] clinic or [phone].        Care EveryWhere ID     This is your Care EveryWhere ID. This could be used by other organizations to access your [place] medical records  [id number]        Your Vitals Were     Pulse Temperature Height Pulse Oximetry BMI (Body Mass Index)       91 98.1  F (36.7  C) (Tympanic) 5' 11\" (1.803 m) 97% 36.12 kg/m2        Blood Pressure from Last 3 Encounters:   17 (!) 139/94   17 115/72   17 121/78    Weight from Last 3 Encounters:   17 259 lb (117.5 kg)   17 263 lb 0.1 oz (119.3 kg)   17 260 lb 4.8 oz (118.1 kg)              We Performed the Following     Lyme Disease Rosa with reflex to WB Serum          Today's Medication Changes          These changes are accurate as of: 17 11:59 PM.  If you have any questions, ask your nurse or doctor.               These medicines have changed or have updated prescriptions.        Dose/Directions    metFORMIN 500 MG 24 hr tablet   Commonly known as:  GLUCOPHAGE-XR   This may have changed:  when to take this   Used for:  Type 2 diabetes mellitus without complications (H)        Dose:  500 mg   Take 1 tablet (500 mg) by mouth 2 times daily (with meals)   Quantity:  180 tablet   Refills:  0                Primary Care Provider Office Phone # Fax #    Genet Ortiz [phone]       [address] " [facility] [address]        Thank you!     Thank you for choosing [facility]  for your care. Our goal is always to provide you with excellent care. Hearing back from our patients is one way we can continue to improve our services. Please take a few minutes to complete the written survey that you may receive in the mail after your visit with us. Thank you!             Your Updated Medication List - Protect others around you: Learn how to safely use, store and throw away your medicines at www.disposemymeds.org.          This list is accurate as of: 6/14/17 11:59 PM.  Always use your most recent med list.                   Brand Name Dispense Instructions for use    ASPIRIN EC PO      Take 325 mg by mouth daily       blood glucose lancing device     30 each    Use to test blood sugars 1 times daily or as directed.       blood glucose monitoring meter device kit     1 kit    Use to test blood sugars 1 times daily or as directed.       blood glucose monitoring test strip    ONE TOUCH ULTRA    1 Box    Use to test blood sugars 1 times daily or as directed.       lisinopril 2.5 MG tablet    PRINIVIL/Zestril    90 tablet    Take 1 tablet (2.5 mg) by mouth daily       metFORMIN 500 MG 24 hr tablet    GLUCOPHAGE-XR    180 tablet    Take 1 tablet (500 mg) by mouth 2 times daily (with meals)       order for DME      Phi was set up on [employer]  Type [employer] Auto (System One 60 series)  Serial Number: [id number] Modem Number: [id number] Pressure: AUTO-TITRATE CPAP 7-12 cm H20 Mask of choice: AIRFIT F 10 FULL FACE MASK Size: MEDIUM HEATED TUBING AND MODEM PROVIDED       order for DME     1 each    Automated blood pressure cuff.       pantoprazole 40 MG EC tablet    PROTONIX    30 tablet    Take 1 tablet (40 mg) by mouth daily Take 30-60 minutes before a meal.       rosuvastatin 40 MG tablet    CRESTOR    90 tablet    Take 1 tablet (40 mg) by mouth daily

## 2017-06-14 NOTE — NURSING NOTE
"Chief Complaint   Patient presents with     Hospital F/U       Initial BP (!) 139/94 (BP Location: Right arm)  Pulse 91  Temp 98.1  F (36.7  C) (Tympanic)  Ht 5' 11\" (1.803 m)  Wt 259 lb (117.5 kg)  SpO2 97%  BMI 36.12 kg/m2 Estimated body mass index is 36.12 kg/(m^2) as calculated from the following:    Height as of this encounter: 5' 11\" (1.803 m).    Weight as of this encounter: 259 lb (117.5 kg).  Medication Reconciliation: complete    "

## 2017-06-15 ENCOUNTER — HOSPITAL ENCOUNTER (OUTPATIENT)
Dept: CT IMAGING | Facility: CLINIC | Age: 47
Discharge: HOME | End: 2017-06-15
Attending: FAMILY MEDICINE | Admitting: FAMILY MEDICINE
Payer: COMMERCIAL

## 2017-06-15 DIAGNOSIS — R07.89 ATYPICAL CHEST PAIN: ICD-10-CM

## 2017-06-15 DIAGNOSIS — F41.9 ANXIETY: Primary | ICD-10-CM

## 2017-06-15 LAB — B BURGDOR IGG+IGM SER QL: 0.07 (ref 0–0.89)

## 2017-06-15 PROCEDURE — 71260 CT THORAX DX C+: CPT

## 2017-06-15 PROCEDURE — 25000128 H RX IP 250 OP 636: Performed by: FAMILY MEDICINE

## 2017-06-15 PROCEDURE — 25000125 ZZHC RX 250: Performed by: FAMILY MEDICINE

## 2017-06-15 RX ORDER — IOPAMIDOL 755 MG/ML
92 INJECTION, SOLUTION INTRAVASCULAR ONCE
Status: COMPLETED | OUTPATIENT
Start: 2017-06-15 | End: 2017-06-15

## 2017-06-15 RX ORDER — ALPRAZOLAM 0.25 MG
0.25 TABLET ORAL 3 TIMES DAILY PRN
Qty: 15 TABLET | Refills: 0 | Status: SHIPPED | OUTPATIENT
Start: 2017-06-15 | End: 2017-06-16

## 2017-06-15 RX ADMIN — SODIUM CHLORIDE 75 ML: 9 INJECTION, SOLUTION INTRAVENOUS at 15:05

## 2017-06-15 RX ADMIN — IOPAMIDOL 92 ML: 755 INJECTION, SOLUTION INTRAVENOUS at 15:05

## 2017-06-15 NOTE — PROGRESS NOTES
Please call the patient with the results. Notify that chest CT looks normal no blood clot or lung problems. I think possibly anxiety contributing to his symptoms. We discussed trying anti-anxiety medication. Xanax prescription printed. Have him try that for a few doses/episodes and let me know if that's helpful or not.Prescription printed please fax to pharmacy of patient's choice.

## 2017-06-16 ENCOUNTER — TELEPHONE (OUTPATIENT)
Dept: FAMILY MEDICINE | Facility: CLINIC | Age: 47
End: 2017-06-16

## 2017-06-16 DIAGNOSIS — F41.9 ANXIETY: ICD-10-CM

## 2017-06-16 RX ORDER — ALPRAZOLAM 0.25 MG
0.25 TABLET ORAL 3 TIMES DAILY PRN
Qty: 15 TABLET | Refills: 0 | Status: SHIPPED | OUTPATIENT
Start: 2017-06-16 | End: 2018-07-24

## 2017-06-16 NOTE — TELEPHONE ENCOUNTER
Pt called back with fax # to send letter below to:  Fax to [phone]  Pt also would like Rx sent to [facility] - [facility] did NOT receive Rx for Alprazolam - Please reprint and put in [facility] box to be faxed

## 2017-06-16 NOTE — TELEPHONE ENCOUNTER
Pt calling again wondering if this can be done soon    [facility]

## 2017-06-16 NOTE — TELEPHONE ENCOUNTER
Reason for Call:  Other Letter    Detailed comments: Pt saw Dr. FLORIAN Crook 06/14 and rec'd a work excuse letter for 06/14/17 - 06/18/17.  Pt's employer is also asking for a work excuse note for June 12th and 13th as well - Pt was seen in ER and admitted and could not work.  Pt will call back with work fax#       Phone Number Patient can be reached at: Home number on file [phone] (home)    Best Time: any    Can we leave a detailed message on this number? YES    Call taken on 6/16/2017 at 9:12 AM by Radha Willis

## 2017-06-16 NOTE — LETTER
[facility]  [address]  [facility] [address]  [phone]  Dept: [phone]      6/16/2017    Re: Phi Ortiz Jr.          To whom it may concern:    RE: Phi Ortiz Jr.    Please excuse Phi due to illness and hospitalization from June 12 th to June 17 th.     Patient may return to work 6/18/2017  with the following:  No working or lifting restrictions    Please contact me for questions or concerns.      Sincerely,      Zhao Crook MD  [facility]

## 2017-06-19 DIAGNOSIS — K21.9 GASTROESOPHAGEAL REFLUX DISEASE WITHOUT ESOPHAGITIS: ICD-10-CM

## 2017-06-19 NOTE — TELEPHONE ENCOUNTER
Protonix 40mg      Last Written Prescription Date: 3/17/17  Last Fill Quantity: 30,  # refills: 1   Last Office Visit with [facility], [facility] or [facility] prescribing provider: 6/14/17                                         Next 5 appointments (look out 90 days)     Jun 23, 2017 10:20 AM CDT   SHORT with Genet Ortiz NP   [facility] ([facility])    [address] [place] [address]   [phone]                    Thank You-  Radha Martinez CPhT  [facility] [place]

## 2017-06-21 RX ORDER — PANTOPRAZOLE SODIUM 40 MG/1
40 TABLET, DELAYED RELEASE ORAL DAILY
Qty: 30 TABLET | Refills: 1 | Status: SHIPPED | OUTPATIENT
Start: 2017-06-21 | End: 2017-08-25

## 2017-06-23 ENCOUNTER — TELEPHONE (OUTPATIENT)
Dept: FAMILY MEDICINE | Facility: CLINIC | Age: 47
End: 2017-06-23

## 2017-06-23 DIAGNOSIS — F41.9 ANXIETY: ICD-10-CM

## 2017-06-23 RX ORDER — ALPRAZOLAM 0.25 MG
0.25 TABLET ORAL 3 TIMES DAILY PRN
Qty: 15 TABLET | Refills: 0 | Status: CANCELLED | OUTPATIENT
Start: 2017-06-23

## 2017-06-23 ASSESSMENT — ANXIETY QUESTIONNAIRES
1. FEELING NERVOUS, ANXIOUS, OR ON EDGE: NEARLY EVERY DAY
5. BEING SO RESTLESS THAT IT IS HARD TO SIT STILL: NEARLY EVERY DAY
6. BECOMING EASILY ANNOYED OR IRRITABLE: NEARLY EVERY DAY
GAD7 TOTAL SCORE: 20
2. NOT BEING ABLE TO STOP OR CONTROL WORRYING: NEARLY EVERY DAY
3. WORRYING TOO MUCH ABOUT DIFFERENT THINGS: NEARLY EVERY DAY
7. FEELING AFRAID AS IF SOMETHING AWFUL MIGHT HAPPEN: MORE THAN HALF THE DAYS

## 2017-06-23 ASSESSMENT — PATIENT HEALTH QUESTIONNAIRE - PHQ9: 5. POOR APPETITE OR OVEREATING: NEARLY EVERY DAY

## 2017-06-23 NOTE — TELEPHONE ENCOUNTER
Pt struggling with anxiety, Xanax working well, needing a refill to last until appt 6/29   Xanax       Last Written Prescription Date: 6/16/2017  Last Fill Quantity: 15,  # refills: 0   Last Office Visit with [facility], [facility] or [facility] prescribing provider: 6/14/2017                                         Next 5 appointments (look out 90 days)     Jun 29, 2017  9:00 AM CDT   SHORT with Zhao Crook MD   [facility] ([facility])    [address]  [facility] [address]   [phone]

## 2017-06-23 NOTE — TELEPHONE ENCOUNTER
Dr. Ortiz, Doctor of the day.  This is a patient of Dr. FLORIAN Crook:      S-(situation): needing refill of xanax    B-(background): patient  Has many stressors.  , wife has boyfriend, son graduating, moving.    A-(assessment): anxiety    R-(recommendations): I have updated patient's PHQ-9 and RITA-7 for your review.  Lacey LARA RN    PHQ-9 ([employer]) 6/23/2017   No Interest In Doing Things    Feeling Depressed    Trouble Sleeping    Tired / No Energy    No appetite or Over-Eating    Feeling Bad about Self    Trouble Concentrating    Moving Slow or Restless    Suicidal Thoughts    Total Score    1.  Little interest or pleasure in doing things 2   2.  Feeling down, depressed, or hopeless 3   3.  Trouble falling or staying asleep, or sleeping too much 3   4.  Feeling tired or having little energy 3   5.  Poor appetite or overeating 3   6.  Feeling bad about yourself 2   7.  Trouble concentrating 2   8.  Moving slowly or restless 2   9.  Suicidal or self-harm thoughts 0   PHQ-9 Total Score 20   Difficulty at work, home, or with people    RITA-7   [employer], 2002; Used with Permission) 6/23/2017   Over the last 2 weeks, how often have you been bothered by feeling nervous, anxious or on edge?    Over the last 2 weeks, how often have you been bothered by not being able to stop or control worrying?    Over the last 2 weeks, how often have you been bothered by worrying too much about different things?    Over the last 2 weeks, how often have you been bothered by trouble relaxing?    Over the last 2 weeks, how often have you been bothered by being so restless that it is hard to sit still?    Over the last 2 weeks, how often have you been bothered by becoming easily annoyed or irritable?    Over the last 2 weeks, how often have you been bothered by feeling afraid as if something awful might happen?    RITA-7 Total Score =     1. Feeling nervous, anxious, or on edge 3   2. Not being able to stop or control worrying 3    3. Worrying too much about different things 3   4. Trouble relaxing 3   5. Being so restless that it is hard to sit still 3   6. Becoming easily annoyed or irritable 3   7. Feeling afraid, as if something awful might happen 2   RITA-7 Total Score 20   If you checked any problems, how difficult have they made it for you to do your work, take care of things at home, or get along with other people?

## 2017-06-23 NOTE — TELEPHONE ENCOUNTER
We were going to try xanax short-term to see if helpful for chest pain. If so, then anxiety may be likely cause - especially in light of normal cardiac work-up. If xanax is helping then I would recommend starting a daily controller medication. Xanax is not safe to use frequently or long-term due to risk of dependence/addiction. Advise a follow-up appointment to discuss medication options.

## 2017-06-24 ASSESSMENT — PATIENT HEALTH QUESTIONNAIRE - PHQ9: SUM OF ALL RESPONSES TO PHQ QUESTIONS 1-9: 20

## 2017-06-24 ASSESSMENT — ANXIETY QUESTIONNAIRES: GAD7 TOTAL SCORE: 20

## 2017-06-26 NOTE — TELEPHONE ENCOUNTER
Patient was notified of the instructions and information below.  Patient has appointment on 6/29/17.    Thank you  Ranjana NASSAR RN

## 2017-06-29 ENCOUNTER — OFFICE VISIT (OUTPATIENT)
Dept: FAMILY MEDICINE | Facility: CLINIC | Age: 47
End: 2017-06-29
Payer: COMMERCIAL

## 2017-06-29 ENCOUNTER — ALLIED HEALTH/NURSE VISIT (OUTPATIENT)
Dept: FAMILY MEDICINE | Facility: CLINIC | Age: 47
End: 2017-06-29
Payer: COMMERCIAL

## 2017-06-29 VITALS
DIASTOLIC BLOOD PRESSURE: 83 MMHG | WEIGHT: 247 LBS | TEMPERATURE: 97.5 F | HEART RATE: 69 BPM | BODY MASS INDEX: 34.58 KG/M2 | HEIGHT: 71 IN | SYSTOLIC BLOOD PRESSURE: 123 MMHG

## 2017-06-29 DIAGNOSIS — F41.1 GENERALIZED ANXIETY DISORDER: Chronic | ICD-10-CM

## 2017-06-29 DIAGNOSIS — F32.0 MILD MAJOR DEPRESSION (H): Primary | Chronic | ICD-10-CM

## 2017-06-29 DIAGNOSIS — F41.9 ANXIETY: Primary | ICD-10-CM

## 2017-06-29 DIAGNOSIS — K21.9 GASTROESOPHAGEAL REFLUX DISEASE WITHOUT ESOPHAGITIS: ICD-10-CM

## 2017-06-29 PROCEDURE — 99207 ZZC NO CHARGE NURSE ONLY: CPT

## 2017-06-29 PROCEDURE — 99214 OFFICE O/P EST MOD 30 MIN: CPT | Performed by: NURSE PRACTITIONER

## 2017-06-29 RX ORDER — FLUOXETINE 10 MG/1
CAPSULE ORAL
Qty: 60 CAPSULE | Refills: 1 | Status: SHIPPED | OUTPATIENT
Start: 2017-06-29 | End: 2018-05-08

## 2017-06-29 RX ORDER — HYDROXYZINE HYDROCHLORIDE 25 MG/1
25-50 TABLET, FILM COATED ORAL EVERY 8 HOURS PRN
Qty: 60 TABLET | Refills: 1 | Status: SHIPPED | OUTPATIENT
Start: 2017-06-29 | End: 2018-05-08

## 2017-06-29 NOTE — PROGRESS NOTES
"  SUBJECTIVE:                                                    Phi Ortiz Jr. is a 46 year old male who presents to clinic today for the following health issues:      Anxiety/depression Follow-Up  Not on any medication per patient      Status since last visit: Worsened     Other associated symptoms: heartburn/chest pain    Complicating factors:   Significant life event: Yes-  Going through divorce   Current substance abuse: None  Depression symptoms: Yes    RITA-7 SCORE 2/9/2017 5/24/2017 6/23/2017   Total Score - - -   Total Score 8 15 20       PHQ-9 SCORE 5/24/2017 6/23/2017 6/29/2017   Total Score - - -   Total Score 13 20 24       GERD:  Chronic issue.  Taking protonix - having symptoms despite.  Rare alcohol, no tobacco.  Tomatoes, garlic and onion are prominent in diet.        Problem list and histories reviewed & adjusted, as indicated.  Additional history: as documented      Reviewed and updated as needed this visit by clinical staff  Tobacco  Allergies  Meds       Reviewed and updated as needed this visit by Provider         ROS:  Constitutional, HEENT, cardiovascular, pulmonary, gi and gu systems are negative, except as otherwise noted.    OBJECTIVE:     /83  Pulse 69  Temp 97.5  F (36.4  C) (Oral)  Ht 5' 11\" (1.803 m)  Wt 247 lb (112 kg)  BMI 34.45 kg/m2  Body mass index is 34.45 kg/(m^2).  GENERAL: healthy, alert and no distress  PSYCH: mentation appears normal and affect flat      ASSESSMENT/PLAN:       ICD-10-CM    1. Mild major depression (H) F32.0 MENTAL HEALTH REFERRAL     FLUoxetine (PROZAC) 10 MG capsule   2. Generalized anxiety disorder F41.1 MENTAL HEALTH REFERRAL     FLUoxetine (PROZAC) 10 MG capsule     hydrOXYzine (ATARAX) 25 MG tablet   3. Gastroesophageal reflux disease without esophagitis K21.9        Patient Instructions   Make appointment with Billie Iraheta for counseling.      Hydroxyzine 1-2 tablets every 8 hours if needed for anxiety.      Start Prozac: One " capsule daily for 1 week, then increase to 2 capsules daily  Discussed risks/bennefits and possible side effects of antidepressants, including but not limited to GI upset, disrupted sleep, loss of libido, worsening of mood or even possible risk of increased suicidal thoughts.  These medications often take 4-6 weeks to be effective.    Also discussed the importance of using them for 6-9 months before stopping, to avoid rebound symptoms.   Contact the clinic if having any adverse effects.  Do not stop the medication abruptly.    Verbal contract for jayson discussed, patient should contact the clinic or 24 hour nurse line if any thoughts of self harm.    Follow up in one month or sooner if problems.        For heartburn:  1. Avoid eating within 3-4 hours of bedtime.    2. Eat frequent small meals per day rather than large meals.    3. Avoid tobacco and alcohol products, avoid tight fitting clothes, elevate head of bed with six inch blocks.  4. Take antacids, like TUMS, for occasional heart burn.    5. Avoid NSAIDS (ibuprofen and naproxen).  6. If overweight, weight loss is recommended. Losing even as little as 10 lbs may decrease symptoms.  7. Avoid high fat meals and other foods that aggravate the problem. Foods that may cause more symptoms are: chocolate, tomato-based foods, alcohol, peppermint, caffeinated products, citrus fruits and drinks, onions and garlic.        The risks, benefits and treatment options of prescribed medications or other treatments have been discussed with the patient. The patient verbalized their understanding and should call or follow up if no improvement or if they develop further problems.    MICHELLE Garnett [facility]

## 2017-06-29 NOTE — NURSING NOTE
Patient missed appt this morning with Dr. FLORIAN Crook.  He would like to discuss anxiety/depression medication.  Mental Health referral.  Doctor note for missing work today.  RN advised appt.  Patient had scheduled appt at [place] for later this afternoon.    Nanda LARSON RN

## 2017-06-29 NOTE — LETTER
[facility]  [address] [facility] [address]  Phone: [phone]    June 29, 2017    RE: Phi Ortiz Jr.  [address] [place] [address]              To Whom it May Concern,    Please excuse Phi Ortiz from work today for medical reasons.          Sincerely,          Farzaneh Thomas, CNP

## 2017-06-29 NOTE — PATIENT INSTRUCTIONS
Make appointment with Billie Iraheta for counseling.      Hydroxyzine 1-2 tablets every 8 hours if needed for anxiety.      Start Prozac: One capsule daily for 1 week, then increase to 2 capsules daily  Discussed risks/bennefits and possible side effects of antidepressants, including but not limited to GI upset, disrupted sleep, loss of libido, worsening of mood or even possible risk of increased suicidal thoughts.  These medications often take 4-6 weeks to be effective.    Also discussed the importance of using them for 6-9 months before stopping, to avoid rebound symptoms.   Contact the clinic if having any adverse effects.  Do not stop the medication abruptly.    Verbal contract for jayson discussed, patient should contact the clinic or 24 hour nurse line if any thoughts of self harm.    Follow up in one month or sooner if problems.        For heartburn:  1. Avoid eating within 3-4 hours of bedtime.    2. Eat frequent small meals per day rather than large meals.    3. Avoid tobacco and alcohol products, avoid tight fitting clothes, elevate head of bed with six inch blocks.  4. Take antacids, like TUMS, for occasional heart burn.    5. Avoid NSAIDS (ibuprofen and naproxen).  6. If overweight, weight loss is recommended. Losing even as little as 10 lbs may decrease symptoms.  7. Avoid high fat meals and other foods that aggravate the problem. Foods that may cause more symptoms are: chocolate, tomato-based foods, alcohol, peppermint, caffeinated products, citrus fruits and drinks, onions and garlic.        Thank you for choosing [facility].  You may be receiving a survey in the mail from [employer] regarding your visit today.  Please take a few minutes to complete and return the survey to let us know how we are doing.      If you have questions or concerns, please contact us via [employer] or you can contact your care team at [phone].    Our Clinic hours are:  Monday 6:40 am  to 7:00 pm  Tuesday -Friday  6:40 am to 5:00 pm    The [place] outpatient lab hours are:  Monday - Friday 6:10 am to 4:45 pm  Saturdays 7:00 am to 11:00 am  Appointments are required, call [phone]    If you have clinical questions after hours or would like to schedule an appointment,  call the clinic at [phone].

## 2017-06-29 NOTE — MR AVS SNAPSHOT
"              After Visit Summary   6/29/2017    Phi Ortiz Jr.    MRN: [id number]           Patient Information     Date Of Birth          1970        Visit Information        Provider Department      6/29/2017 11:00 AM [place] CL RN [facility]        Today's Diagnoses     Anxiety    -  1       Follow-ups after your visit        Your next 10 appointments already scheduled     Jun 29, 2017  2:00 PM CDT   SHORT with MICHELLE Cortes CNP   [facility]    [address] [address] [phone]            Jul 06, 2017  1:45 PM CDT   New Visit with Sal Ko MD   [facility]    [address] [address]   [phone]              Who to contact     If you have questions or need follow up information about today's clinic visit or your schedule please contact [facility] directly at [phone].  Normal or non-critical lab and imaging results will be communicated to you by [employer], letter or phone within 4 business days after the clinic has received the results. If you do not hear from us within 7 days, please contact the clinic through [employer] or phone. If you have a critical or abnormal lab result, we will notify you by phone as soon as possible.  Submit refill requests through [employer] or call your pharmacy and they will forward the refill request to us. Please allow 3 business days for your refill to be completed.          Additional Information About Your Visit        MyChart Information     [employer] lets you send messages to your doctor, view your test results, renew your prescriptions, schedule appointments and more. To sign up, go to [email or web address] . Click on \"Log in\" on the left side of the screen, which will take you to the Welcome page. Then click on \"Sign up Now\" on the right side of the page.     You " will be asked to enter the access code listed below, as well as some personal information. Please follow the directions to create your username and password.     Your access code is: [email or web address]  Expires: 2017  4:48 PM     Your access code will  in 90 days. If you need help or a new code, please call your [place] clinic or [phone].        Care EveryWhere ID     This is your Care EveryWhere ID. This could be used by other organizations to access your [place] medical records  [id number]         Blood Pressure from Last 3 Encounters:   17 (!) 139/94   17 115/72   17 121/78    Weight from Last 3 Encounters:   17 259 lb (117.5 kg)   17 263 lb 0.1 oz (119.3 kg)   17 260 lb 4.8 oz (118.1 kg)              Today, you had the following     No orders found for display         Today's Medication Changes          These changes are accurate as of: 17  1:45 PM.  If you have any questions, ask your nurse or doctor.               These medicines have changed or have updated prescriptions.        Dose/Directions    metFORMIN 500 MG 24 hr tablet   Commonly known as:  GLUCOPHAGE-XR   This may have changed:  when to take this   Used for:  Type 2 diabetes mellitus without complications (H)        Dose:  500 mg   Take 1 tablet (500 mg) by mouth 2 times daily (with meals)   Quantity:  180 tablet   Refills:  0                Primary Care Provider Office Phone # Fax #    Genet Ortiz [phone]       [place] [address] [address] [address]        Equal Access to Services     [facility] BETH : Hadii karen elizabeth hadasho Soomaali, waaxda luqadaha, qaybta kaalmada tawanna, cristal carranza. So [facility] [phone].    ATENCIÓN: Si habla español, tiene a king disposición servicios gratuitos de asistencia lingüística. Llame al [phone].    We comply with applicable federal civil rights laws and [place] laws. We do not  discriminate on the basis of race, color, national origin, age, disability sex, sexual orientation or gender identity.            Thank you!     Thank you for choosing [facility]  for your care. Our goal is always to provide you with excellent care. Hearing back from our patients is one way we can continue to improve our services. Please take a few minutes to complete the written survey that you may receive in the mail after your visit with us. Thank you!             Your Updated Medication List - Protect others around you: Learn how to safely use, store and throw away your medicines at www.disposemymeds.org.          This list is accurate as of: 6/29/17  1:45 PM.  Always use your most recent med list.                   Brand Name Dispense Instructions for use Diagnosis    ALPRAZolam 0.25 MG tablet    XANAX    15 tablet    Take 1 tablet (0.25 mg) by mouth 3 times daily as needed for anxiety    Anxiety       ASPIRIN EC PO      Take 325 mg by mouth daily        blood glucose lancing device     30 each    Use to test blood sugars 1 times daily or as directed.    Type 2 diabetes mellitus without complications (H)       blood glucose monitoring meter device kit     1 kit    Use to test blood sugars 1 times daily or as directed.    Type 2 diabetes mellitus without complications (H)       blood glucose monitoring test strip    ONE TOUCH ULTRA    1 Box    Use to test blood sugars 1 times daily or as directed.    Type 2 diabetes mellitus without complications (H)       lisinopril 2.5 MG tablet    PRINIVIL/Zestril    90 tablet    Take 1 tablet (2.5 mg) by mouth daily    Benign essential hypertension       metFORMIN 500 MG 24 hr tablet    GLUCOPHAGE-XR    180 tablet    Take 1 tablet (500 mg) by mouth 2 times daily (with meals)    Type 2 diabetes mellitus without complications (H)       order for DME      Phi was set up on [employer]  Type [employer]star Auto (System One 60 series)  Serial Number:  [id number] Modem Number: [id number] Pressure: AUTO-TITRATE CPAP 7-12 cm H20 Mask of choice: AIRFIT F 10 FULL FACE MASK Size: MEDIUM HEATED TUBING AND MODEM PROVIDED        order for DME     1 each    Automated blood pressure cuff.    Essential hypertension, benign       pantoprazole 40 MG EC tablet    PROTONIX    30 tablet    Take 1 tablet (40 mg) by mouth daily Take 30-60 minutes before a meal.    Gastroesophageal reflux disease without esophagitis       rosuvastatin 40 MG tablet    CRESTOR    90 tablet    Take 1 tablet (40 mg) by mouth daily    Hyperlipidemia LDL goal <100

## 2017-06-29 NOTE — MR AVS SNAPSHOT
After Visit Summary   6/29/2017    Phi Ortiz Jr.    MRN: [id number]           Patient Information     Date Of Birth          1970        Visit Information        Provider Department      6/29/2017 2:00 PM Farzaneh Thomas APRN [facility]        Today's Diagnoses     Mild major depression (H)    -  1    Generalized anxiety disorder          Care Instructions    Make appointment with Billie Iraheta for counseling.      Hydroxyzine 1-2 tablets every 8 hours if needed for anxiety.      Start Prozac: One capsule daily for 1 week, then increase to 2 capsules daily  Discussed risks/bennefits and possible side effects of antidepressants, including but not limited to GI upset, disrupted sleep, loss of libido, worsening of mood or even possible risk of increased suicidal thoughts.  These medications often take 4-6 weeks to be effective.    Also discussed the importance of using them for 6-9 months before stopping, to avoid rebound symptoms.   Contact the clinic if having any adverse effects.  Do not stop the medication abruptly.    Verbal contract for jayson discussed, patient should contact the clinic or 24 hour nurse line if any thoughts of self harm.    Follow up in one month or sooner if problems.        For heartburn:  1. Avoid eating within 3-4 hours of bedtime.    2. Eat frequent small meals per day rather than large meals.    3. Avoid tobacco and alcohol products, avoid tight fitting clothes, elevate head of bed with six inch blocks.  4. Take antacids, like TUMS, for occasional heart burn.    5. Avoid NSAIDS (ibuprofen and naproxen).  6. If overweight, weight loss is recommended. Losing even as little as 10 lbs may decrease symptoms.  7. Avoid high fat meals and other foods that aggravate the problem. Foods that may cause more symptoms are: chocolate, tomato-based foods, alcohol, peppermint, caffeinated products, citrus fruits and drinks, onions and  garlic.        Thank you for choosing [facility].  You may be receiving a survey in the mail from Tiara Singh regarding your visit today.  Please take a few minutes to complete and return the survey to let us know how we are doing.      If you have questions or concerns, please contact us via [email or web address] or you can contact your care team at [phone].    Our Clinic hours are:  Monday 6:40 am  to 7:00 pm  Tuesday -Friday 6:40 am to 5:00 pm    The [place] outpatient lab hours are:  Monday - Friday 6:10 am to 4:45 pm  Saturdays 7:00 am to 11:00 am  Appointments are required, call [phone]    If you have clinical questions after hours or would like to schedule an appointment,  call the clinic at [phone].            Follow-ups after your visit        Additional Services     MENTAL HEALTH REFERRAL       Your provider has referred you to: [facility]: [facility] - Counseling (Individual/Couples/Family) - [facility] [phone]   [email or web address]   *Patient will be contacted by [facility] scheduling partner[facility]), to schedule an appointment.  Patients may also call [facility] to schedule.    All scheduling is subject to the client's specific insurance plan & benefits, provider/location availability, and provider clinical specialities.  Please arrive 15 minutes early for your first appointment and bring your completed paperwork.    Please be aware that coverage of these services is subject to the terms and limitations of your health insurance plan.  Call member services at your health plan with any benefit or coverage questions.                  Your next 10 appointments already scheduled     Jul 06, 2017  1:45 PM CDT   [facility] Visit with Sal [facility] MD   [facility]    [address] [address] [phone]              Who to contact  "    If you have questions or need follow up information about today's clinic visit or your schedule please contact [facility] directly at [phone].  Normal or non-critical lab and imaging results will be communicated to you by MyChart, letter or phone within 4 business days after the clinic has received the results. If you do not hear from us within 7 days, please contact the clinic through [employer] or phone. If you have a critical or abnormal lab result, we will notify you by phone as soon as possible.  Submit refill requests through [employer] or call your pharmacy and they will forward the refill request to us. Please allow 3 business days for your refill to be completed.          Additional Information About Your Visit        iPlingharKuailexue Information     [employer] lets you send messages to your doctor, view your test results, renew your prescriptions, schedule appointments and more. To sign up, go to [email or web address] . Click on \"Log in\" on the left side of the screen, which will take you to the Welcome page. Then click on \"Sign up Now\" on the right side of the page.     You will be asked to enter the access code listed below, as well as some personal information. Please follow the directions to create your username and password.     Your access code is: [email or web address]  Expires: 2017  4:48 PM     Your access code will  in 90 days. If you need help or a new code, please call your [place] clinic or [phone].        Care EveryWhere ID     This is your Care EveryWhere ID. This could be used by other organizations to access your [place] medical records  [id number]        Your Vitals Were     Pulse Temperature Height BMI (Body Mass Index)          69 97.5  F (36.4  C) (Oral) 5' 11\" (1.803 m) 34.45 kg/m2         Blood Pressure from Last 3 Encounters:   17 123/83   17 (!) 139/94   17 115/72    Weight from Last 3 Encounters:   17 247 lb (112 kg)   17 259 lb " (117.5 kg)   06/12/17 263 lb 0.1 oz (119.3 kg)              We Performed the Following     MENTAL HEALTH REFERRAL          Today's Medication Changes          These changes are accurate as of: 6/29/17  2:20 PM.  If you have any questions, ask your nurse or doctor.               Start taking these medicines.        Dose/Directions    FLUoxetine 10 MG capsule   Commonly known as:  PROzac   Used for:  Generalized anxiety disorder, Mild major depression (H)   Started by:  Farzaneh Thomas APRN CNP        One capsule daily for 1 week, then increase to 2 capsules daily   Quantity:  60 capsule   Refills:  1       hydrOXYzine 25 MG tablet   Commonly known as:  ATARAX   Used for:  Generalized anxiety disorder   Started by:  Farzaneh Thomas APRN CNP        Dose:  25-50 mg   Take 1-2 tablets (25-50 mg) by mouth every 8 hours as needed for anxiety   Quantity:  60 tablet   Refills:  1         These medicines have changed or have updated prescriptions.        Dose/Directions    metFORMIN 500 MG 24 hr tablet   Commonly known as:  GLUCOPHAGE-XR   This may have changed:  when to take this   Used for:  Type 2 diabetes mellitus without complications (H)        Dose:  500 mg   Take 1 tablet (500 mg) by mouth 2 times daily (with meals)   Quantity:  180 tablet   Refills:  0            Where to get your medicines      These medications were sent to [facility] [address]  [address] [address]     Phone:  [phone]     FLUoxetine 10 MG capsule    hydrOXYzine 25 MG tablet                Primary Care Provider Office Phone # Fax #    Genet Ortiz [phone]       [place] [address] [address] [address]        Equal Access to Services     JEVON CAMPOS AH: Chandu Modi, salvatore dee, cristal arredondo. So [facility] [phone].    ATENCIÓN: Andie higgins  español, tiene a king disposición servicios gratuitos de asistencia lingüística. Edson morrow [phone].    We comply with applicable federal civil rights laws and [place] laws. We do not discriminate on the basis of race, color, national origin, age, disability sex, sexual orientation or gender identity.            Thank you!     Thank you for choosing [facility]  for your care. Our goal is always to provide you with excellent care. Hearing back from our patients is one way we can continue to improve our services. Please take a few minutes to complete the written survey that you may receive in the mail after your visit with us. Thank you!             Your Updated Medication List - Protect others around you: Learn how to safely use, store and throw away your medicines at www.disposemymeds.org.          This list is accurate as of: 6/29/17  2:20 PM.  Always use your most recent med list.                   Brand Name Dispense Instructions for use Diagnosis    ALPRAZolam 0.25 MG tablet    XANAX    15 tablet    Take 1 tablet (0.25 mg) by mouth 3 times daily as needed for anxiety    Anxiety       ASPIRIN EC PO      Take 325 mg by mouth daily        blood glucose lancing device     30 each    Use to test blood sugars 1 times daily or as directed.    Type 2 diabetes mellitus without complications (H)       blood glucose monitoring meter device kit     1 kit    Use to test blood sugars 1 times daily or as directed.    Type 2 diabetes mellitus without complications (H)       blood glucose monitoring test strip    ONE TOUCH ULTRA    1 Box    Use to test blood sugars 1 times daily or as directed.    Type 2 diabetes mellitus without complications (H)       FLUoxetine 10 MG capsule    PROzac    60 capsule    One capsule daily for 1 week, then increase to 2 capsules daily    Generalized anxiety disorder, Mild major depression (H)       hydrOXYzine 25 MG tablet    ATARAX    60 tablet    Take 1-2 tablets (25-50 mg) by  mouth every 8 hours as needed for anxiety    Generalized anxiety disorder       lisinopril 2.5 MG tablet    PRINIVIL/Zestril    90 tablet    Take 1 tablet (2.5 mg) by mouth daily    Benign essential hypertension       metFORMIN 500 MG 24 hr tablet    GLUCOPHAGE-XR    180 tablet    Take 1 tablet (500 mg) by mouth 2 times daily (with meals)    Type 2 diabetes mellitus without complications (H)       order for DME      Phi was set up on [employer]  Type [employer] Auto (System One 60 series)  Serial Number: [id number] Modem Number: [id number] Pressure: AUTO-TITRATE CPAP 7-12 cm H20 Mask of choice: AIRFIT F 10 FULL FACE MASK Size: MEDIUM HEATED TUBING AND MODEM PROVIDED        order for DME     1 each    Automated blood pressure cuff.    Essential hypertension, benign       pantoprazole 40 MG EC tablet    PROTONIX    30 tablet    Take 1 tablet (40 mg) by mouth daily Take 30-60 minutes before a meal.    Gastroesophageal reflux disease without esophagitis       rosuvastatin 40 MG tablet    CRESTOR    90 tablet    Take 1 tablet (40 mg) by mouth daily    Hyperlipidemia LDL goal <100

## 2017-06-30 ASSESSMENT — PATIENT HEALTH QUESTIONNAIRE - PHQ9: SUM OF ALL RESPONSES TO PHQ QUESTIONS 1-9: 24

## 2017-07-05 ENCOUNTER — TELEPHONE (OUTPATIENT)
Dept: FAMILY MEDICINE | Facility: CLINIC | Age: 47
End: 2017-07-05

## 2017-07-05 NOTE — TELEPHONE ENCOUNTER
"Patient called stating that he started prozac 6/29; patient is calling today with \"concerns in the bedroom\". He states that he has had problems previously before starting prozac it may be due to the stress.  He has had recent work up in cardio with stress testing.     Cell phone ok to leave detail message.    Lynda VICTOR  Station     "

## 2017-07-05 NOTE — TELEPHONE ENCOUNTER
Patient contacted and he had ED before starting the prozac.  appt scheduled to discuss ED with PCP. Lacey LARA RN

## 2017-07-10 NOTE — DISCHARGE SUMMARY
[facility]    Discharge Summary  Hospital Medicine    Date of Admission:  6/12/2017  Date of Discharge:  7/10/2017   Discharging Provider: Mary Zelaya  Date of Service: 7/10/2017     Primary Care     Genet Ortiz [place] [address] [facility] [address]      Identification and Chief Complaint: Phi Ortiz Jr. is a 46 year old male who presented on 6/12/2017 with complaint of chest pain.    Discharge Diagnoses       Chest pain    Type 2 diabetes mellitus without complications (H)    Essential hypertension, benign    GERD (gastroesophageal reflux disease)    Hypertriglyceridemia    Discharge Disposition   Discharged to home    Discharge Orders   No discharge procedures on file.  Discharge Medications   Discharge Medication List as of 6/12/2017  5:07 PM      CONTINUE these medications which have NOT CHANGED    Details   ASPIRIN EC PO Take 325 mg by mouth daily, Historical      pantoprazole (PROTONIX) 40 MG EC tablet Take 1 tablet (40 mg) by mouth daily Take 30-60 minutes before a meal., Disp-30 tablet, R-1, E-Prescribe      metFORMIN (GLUCOPHAGE-XR) 500 MG 24 hr tablet Take 1 tablet (500 mg) by mouth 2 times daily (with meals), Disp-180 tablet, R-0, Fax      rosuvastatin (CRESTOR) 40 MG tablet Take 1 tablet (40 mg) by mouth daily, Disp-90 tablet, R-3, E-Prescribe      blood glucose monitoring (ONE TOUCH ULTRA 2) meter device kit Use to test blood sugars 1 times daily or as directed.Disp-1 kit, [id number]      blood glucose (ONE TOUCH DELICA) lancing device Use to test blood sugars 1 times daily or as directed.Disp-30 each, [id number]      blood glucose monitoring (ONE TOUCH ULTRA) test strip Use to test blood sugars 1 times daily or as directed., Disp-1 Box, R-6, E-Prescribe      lisinopril (PRINIVIL,ZESTRIL) 2.5 MG tablet Take 1 tablet (2.5 mg) by mouth daily, Disp-90 tablet, R-1, E-Prescribe      !! ORDER FOR DME, SET TO Phi BYERS was set up  on [employer]  Type [employer] Auto (System One 60 series)  Serial Number: [id number] Modem Number: [id number] Pressure: AUTO-TITRATE CPAP 7-12 cm H20 Mask of choice: AIRFIT F 10 FULL FACE MASK Size: MEDIUM HEATED TUBING AND  MODEM PROVIDEDHistorical      !! order for DME Automated blood pressure cuff.Disp-1 each, R-0, Local Print       !! - Potential duplicate medications found. Please discuss with provider.        Allergies   No Known Allergies    Consultations This Hospital Stay   No consultations were requested during this admission    None    Significant Results and Procedures   Procedures    None    Data   Results for orders placed or performed during the hospital encounter of 06/12/17   NM Exercise stress test    Narrative    GATED MYOCARDIAL PERFUSION SCINTIGRAPHY EXERCISE- ONE DAY STUDY     6/12/2017 3:13 PM  Phi Ortiz  47 y/o  Male  1970.    Indication/Clinical mild History: 46-year-old male with known mild  coronary artery disease undergoing stress test for chest pain.  Angiogram in 2014 showed mild coronary disease.    Impression       1. Exercise: Average exercise capacity       2. EKG: Negative for ischemia       3. Symptoms: No baseline chest pain, during peak exercise, 6/10  chest pain that resolved several minutes into recovery  4. Myocardial perfusion imaging using single isotope technique  demonstrated normal perfusion, no ischemia or infarct.   5. Gated images demonstrated normal wall motion.  The left ventricular  systolic function is 49% at rest, 55% with stress.  6. Nuclear stress test from 2014 showed mild anterior ischemia.    Procedure  The patient performed treadmill exercise using a Jaguar protocol,  completing 9 minutes and 30 seconds with an estimated workload of 10  METS.  The test was terminated due to leg fatigue. The heart rate was  62 beats per minute at baseline and increased to 153 beats at peak  exercise, which was 87% of the maximum predicted heart rate.  "The rest  blood pressure was 118/68 mm/Hg and peak blood pressure is 184/88mm/Hg  with rate pressure product of 28,100. The patient experienced 6/10  chest pain during the test. The patient was not on a beta blocker.    Myocardial perfusion imaging was performed at rest, approximately 45  minutes after the injection intravenously of 11.6of Tc-99m Myoview. At  peak exercise, the patient was injected intravenously with 37.7 mCi of   Tc-99m Myoview and exercise continued for approximately 1 minute.  Gated post-stress tomographic imaging was performed approximately 30  minutes after stress    EKG Findings  The resting EKG demonstrated sinus rhythm, no baseline ST  abnormalities . The stress EKG demonstrated no changes from baseline,  negative for ischemia.    Tomographic Findings  Overall, the study quality is very good . On the stress images, normal  perfusion. On the rest images, normal perfusion . Gated images  demonstrated normal wall motion. The left ventricular ejection  fraction was calculated to be 49% at rest, 55% with stress. TID was  was not appreciated.      MARK CARR MD   XR Chest Port 1 View    Narrative    XR CHEST PORT 1 VW 6/12/2017 8:25 AM     HISTORY: chest pain      Impression    IMPRESSION: The lungs appear clear. No pleural effusions.    PAWAN QUIÑONEZ MD       History of Present Illness   (From H&P) Phi Ortiz Jr. is a 46 year old male with PMH significant for T2DM, HTN, HLD, and GERD who now presents with acute, exertional chest pain.     The patient reports chronic, intermittent chest pain which is \"sharp\" in character over the last two years.  HE is followed by  Heart for this and known CAD (last cath 2014 with 30% stenosis to LAD).  He reports that last evening he awoke with 6/10 left sided chest pressure which was described as \"pressure and dull\" like pain; this is a deviation from his regular chest pain. He reports the pain was constant and associated with both leg " "weakness and SOB; he reports his regular chest pain is NOT associated with this. 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  HE reports that sublingual nitroglycerin in the ED improved the patient's pain.  He denies radiation to his arms or neck.  He denies associated diaphoresis, nausea, numbness/paresthesias.  He otherwise denies fever, chills, vomiting, myalgias, cold-like symptoms (congestion, pharyngitis, sinus pressure, rhinorrhea), swollen glands, headaches, dizziness, cough, wheezes, abdominal pain, diarrhea/constipation, dysuria, hematuria, joint swelling, joint pain, associates to his job (factory work).     On the morning of admission, the patient reports he has had intermittent chest pain since administration of nitroglycerin; however, he notes that this is sharp in character and more akin to his chronic chest pain rather than the chest \"pressure\" he experienced yesterday evening.     The patient reports he's been under significant stress as he is  from his wife.  He has an extremely strong family history of CAD as both his father and father's father had MIs in the 30s.  He has significant risk factors in that he is a type 2 diabetic, has HTN and HLD.  He is a never smoker.  Repots intermittent EtOH.    Hospital Course   Phi Ortiz Jr. was admitted on 6/12/2017.  The following problems were addressed during his hospitalization:    Acute Chest Pain   Differential diagnosis included cardiac origin (unstable angina) versus pulmonary origin (pneumonia) versus GI origin (known GERD) versus musculoskeletal origin. Troponin was negative x3, EKG was unchanged from patient previous and CXR was not done in ED.  Received aspirin, Zantac and nitro x2 in the ED.  Had cardiac cath in 2014 which showed diffuse CAD, none of which was stentable/severe enough to stent at that point in time.  Portable CXR showed no " disease.  Telemetry showed no events.  NM MPI exercise stress trest did not show disease.  Patient was discharged to home with instructions to follow up with his PCP and cardiologist.     CAD  Last Cath 2014 revealed 30% stenosis to the LAD.  No stents were placed - follows with [facility]. Continue PTA aspirin on discharge.  Patient was instructed to follow up with cardiology on discharge as he had not seen them since 04/2016.     Type 2 diabetes mellitus without complications (H)  A1c 7.0 06/02. Home metformin was initially held and patient was started on high SSI.  Completed stress test without difficulty.  Patient will resume home metformin on discharge.      Essential hypertension, benign  BP reviewed, stable. Continue PTA lisinopril on discharge.      GERD (gastroesophageal reflux disease)  Started on Zantac in ED. Stop zantac on discharge and resume PTA protonix.     Hypertriglyceridemia  Compensated. Continue PTA Crestor on discharge    Pending Results   Unresulted Labs Ordered in the Past 30 Days of this Admission     No orders found from 4/13/2017 to 6/13/2017.          Physical Exam      Vitals:    06/12/17 0426 06/12/17 0637   Weight: 117.9 kg (260 lb) 119.3 kg (263 lb 0.1 oz)       Physical Exam: see admission H and P and as admission and discharge are same day.    The discharge plan was discussed with the patient and patient agrees to plan.    Total time on this discharge was greater than 30 minutes.    The patient was seen by Dr. Tolu Hernadez prior to discharge.  She placed discharge order during EPIC downtime so as to expedite patient's discharge from the hospital per his request.    Mary Zelaya PA-C  [facility]

## 2017-07-20 ENCOUNTER — TELEPHONE (OUTPATIENT)
Dept: FAMILY MEDICINE | Facility: CLINIC | Age: 47
End: 2017-07-20

## 2017-07-20 ENCOUNTER — OFFICE VISIT (OUTPATIENT)
Dept: FAMILY MEDICINE | Facility: CLINIC | Age: 47
End: 2017-07-20
Payer: COMMERCIAL

## 2017-07-20 VITALS
BODY MASS INDEX: 33.71 KG/M2 | WEIGHT: 240.8 LBS | SYSTOLIC BLOOD PRESSURE: 122 MMHG | HEART RATE: 60 BPM | HEIGHT: 71 IN | DIASTOLIC BLOOD PRESSURE: 78 MMHG

## 2017-07-20 DIAGNOSIS — N52.1 ERECTILE DYSFUNCTION DUE TO DISEASES CLASSIFIED ELSEWHERE: ICD-10-CM

## 2017-07-20 DIAGNOSIS — E66.01 MORBID OBESITY DUE TO EXCESS CALORIES (H): ICD-10-CM

## 2017-07-20 DIAGNOSIS — I10 BENIGN ESSENTIAL HYPERTENSION: ICD-10-CM

## 2017-07-20 DIAGNOSIS — E78.5 HYPERLIPIDEMIA LDL GOAL <100: ICD-10-CM

## 2017-07-20 DIAGNOSIS — E11.9 TYPE 2 DIABETES MELLITUS WITHOUT COMPLICATION, WITHOUT LONG-TERM CURRENT USE OF INSULIN (H): Primary | ICD-10-CM

## 2017-07-20 LAB
CHOLEST SERPL-MCNC: 220 MG/DL
HDLC SERPL-MCNC: 37 MG/DL
LDLC SERPL CALC-MCNC: 138 MG/DL
NONHDLC SERPL-MCNC: 183 MG/DL
TRIGL SERPL-MCNC: 225 MG/DL

## 2017-07-20 PROCEDURE — 80061 LIPID PANEL: CPT | Performed by: FAMILY MEDICINE

## 2017-07-20 PROCEDURE — 36415 COLL VENOUS BLD VENIPUNCTURE: CPT | Performed by: FAMILY MEDICINE

## 2017-07-20 PROCEDURE — 99214 OFFICE O/P EST MOD 30 MIN: CPT | Performed by: FAMILY MEDICINE

## 2017-07-20 RX ORDER — METFORMIN HCL 500 MG
500 TABLET, EXTENDED RELEASE 24 HR ORAL EVERY EVENING
Qty: 90 TABLET | Refills: 1 | Status: SHIPPED | OUTPATIENT
Start: 2017-07-20 | End: 2017-12-11

## 2017-07-20 RX ORDER — ROSUVASTATIN CALCIUM 40 MG/1
40 TABLET, COATED ORAL DAILY
Qty: 90 TABLET | Refills: 3 | Status: SHIPPED | OUTPATIENT
Start: 2017-07-20 | End: 2017-12-11

## 2017-07-20 RX ORDER — SILDENAFIL 100 MG/1
50-100 TABLET, FILM COATED ORAL DAILY PRN
Qty: 6 TABLET | Refills: 11 | Status: SHIPPED | OUTPATIENT
Start: 2017-07-20 | End: 2018-05-08

## 2017-07-20 RX ORDER — LISINOPRIL 2.5 MG/1
2.5 TABLET ORAL DAILY
Qty: 90 TABLET | Refills: 1 | Status: SHIPPED | OUTPATIENT
Start: 2017-07-20 | End: 2017-12-11

## 2017-07-20 NOTE — NURSING NOTE
"Chief Complaint   Patient presents with     Patient Request     pt. is here today with concerns about testosterone and to discuss with Dr. Licona      Lipids     pt. is fasting today for lab work       Initial /78 (BP Location: Right arm, Patient Position: Chair, Cuff Size: Adult Large)  Pulse 60  Ht 5' 11\" (1.803 m)  Wt 240 lb 12.8 oz (109.2 kg)  BMI 33.58 kg/m2 Estimated body mass index is 33.58 kg/(m^2) as calculated from the following:    Height as of this encounter: 5' 11\" (1.803 m).    Weight as of this encounter: 240 lb 12.8 oz (109.2 kg).  Medication Reconciliation: complete     Leonila Padron, CMA      "

## 2017-07-20 NOTE — TELEPHONE ENCOUNTER
PA needed for this medication    INS: [employer]  ID: [id number]  #: [phone]    Genet Delcid CPhT  [facility]armacy  [phone]

## 2017-07-20 NOTE — PROGRESS NOTES
Please SEND LETTER    Your cholesterol levels are significantly higher than the last couple times. I am assuming this is because you ran out of her medication and had not been taking it recently. If that is the case, getting back on the medication will helptake care of the issue. If you have been on the Crestor regularly, then you really need to work on losing weight because you are already on the maximum dose

## 2017-07-20 NOTE — PROGRESS NOTES
SUBJECTIVE:                                                    Phi Ortiz Jr. is a 46 year old male who presents to clinic today for the following health issues:      Hyperlipidemia Follow-Up      Rate your low fat/cholesterol diet?: good    Taking statin?  Yes, no muscle aches from statin    Other lipid medications/supplements?:  none        Amount of exercise or physical activity: 6-7 days/week for an average of very active    Problems taking medications regularly: No    Medication side effects: none  Diet: regular (no restrictions)      PROBLEMS TO ADD ON...  Diabetes Follow-up          Diabetic concerns: None     Symptoms of hypoglycemia (low blood sugar): none     Paresthesias (numbness or burning in feet) or sores: No        Hypertension Follow-up      Outpatient blood pressures are being checked at home.  Results are in the acceptable range.    Low Salt Diet: no added salt        Erectile dysfunction: The last 2 months he has noted that his erections are not as firm or long-lasting, although he is able to get an erection each time. He is wondering if this could be related to low testosterone levels. Does not really endorse any other symptoms to suggest hypo-testosteronism    Morbid obesity: He has been working hard to lose weight and is down significantly since June as noted below  Wt Readings from Last 5 Encounters:   07/20/17 240 lb 12.8 oz (109.2 kg)   06/29/17 247 lb (112 kg)   06/14/17 259 lb (117.5 kg)   06/12/17 263 lb 0.1 oz (119.3 kg)   06/02/17 260 lb 4.8 oz (118.1 kg)         Problem list and histories reviewed & adjusted, as indicated.  Additional history: none        Reviewed and updated as needed this visit by clinical staffTobacco  Allergies  Med Hx  Surg Hx  Fam Hx  Soc Hx      Reviewed and updated as needed this visit by Provider               ROS:  Constitutional, HEENT, cardiovascular, pulmonary, gi and gu systems are negative, except as otherwise noted.          OBJECTIVE:      "                                               /78 (BP Location: Right arm, Patient Position: Chair, Cuff Size: Adult Large)  Pulse 60  Ht 5' 11\" (1.803 m)  Wt 240 lb 12.8 oz (109.2 kg)  BMI 33.58 kg/m2    GENERAL: healthy, alert and no distress  EYES: Eyes grossly normal to inspection, extraocular movements - intact, and PERRL  NECK: no tenderness, no adenopathy, no asymmetry, no masses, no stiffness; thyroid- normal to palpation  RESP: lungs clear to auscultation - no rales, no rhonchi, no wheezes  CV: regular rates and rhythm, normal S1 S2, no S3 or S4 and no murmur, no click or rub -  MS: extremities- no gross deformities noted, no edema  - male: testicles- normal, no atrophy, no masses;  no inguinal hernias    Lab Results   Component Value Date    A1C 7.0 06/02/2017    A1C 6.1 10/06/2016                                   :         ASSESSMENT/PLAN:                                                    ASSESSMENT:  1. Type 2 diabetes mellitus without complication, without long-term current use of insulin (H)    2. Hyperlipidemia LDL goal <100    3. Benign essential hypertension    4. Erectile dysfunction due to diseases classified elsewhere    5. Morbid obesity due to excess calories (H)        PLAN:  I discussed with him that it is actually relatively rare for erectile dysfunction to be caused by low testosterone levels. He has several other underlying factors that could contribute to this including his diabetes and taking antihypertensive medications. Fortunately sildenafil use he will work to matter what the underlying cause so we'll give him a trial of that. He could come back in first thing in the morning for a testosterone level if he wishes and this was ordered        Orders Placed This Encounter     Lipid panel reflex to direct LDL     Testosterone, total     rosuvastatin (CRESTOR) 40 MG tablet     metFORMIN (GLUCOPHAGE-XR) 500 MG 24 hr tablet     lisinopril (PRINIVIL/ZESTRIL) 2.5 MG tablet     " sildenafil (REVATIO/VIAGRA) 100 MG cap/tab   Discussed how to take the medication(s), expected outcomes, potential side effects.     FRANCISCO Copeland Post  [facility]

## 2017-07-20 NOTE — MR AVS SNAPSHOT
"              After Visit Summary   7/20/2017    Phi Ortiz Jr.    MRN: [id number]           Patient Information     Date Of Birth          1970        Visit Information        Provider Department      7/20/2017 9:20 AM FRANCISCO Licona MD [facility]        Today's Diagnoses     Erectile dysfunction due to diseases classified elsewhere    -  1    Hyperlipidemia LDL goal <100        Type 2 diabetes mellitus without complication, without long-term current use of insulin (H)        Benign essential hypertension           Follow-ups after your visit        Future tests that were ordered for you today     Open Future Orders        Priority Expected Expires Ordered    Testosterone, total Routine 8/20/2017 7/20/2018 7/20/2017            Who to contact     If you have questions or need follow up information about today's clinic visit or your schedule please contact [facility] directly at [phone].  Normal or non-critical lab and imaging results will be communicated to you by [employer], letter or phone within 4 business days after the clinic has received the results. If you do not hear from us within 7 days, please contact the clinic through [employer] or phone. If you have a critical or abnormal lab result, we will notify you by phone as soon as possible.  Submit refill requests through [employer] or call your pharmacy and they will forward the refill request to us. Please allow 3 business days for your refill to be completed.          Additional Information About Your Visit        MyChart Information     [employer] lets you send messages to your doctor, view your test results, renew your prescriptions, schedule appointments and more. To sign up, go to [email or web address] . Click on \"Log in\" on the left side of the screen, which will take you to the Welcome page. Then click on \"Sign up Now\" on the right side of the page.     You will be asked to enter the access code listed below, " "as well as some personal information. Please follow the directions to create your username and password.     Your access code is: [email or web address]  Expires: 2017  4:48 PM     Your access code will  in 90 days. If you need help or a new code, please call your [facility] clinic or [phone].        Care EveryWhere ID     This is your Care EveryWhere ID. This could be used by other organizations to access your [facility] medical records  [id number]        Your Vitals Were     Pulse Height BMI (Body Mass Index)             60 5' 11\" (1.803 m) 33.58 kg/m2          Blood Pressure from Last 3 Encounters:   17 122/78   17 123/83   17 (!) 139/94    Weight from Last 3 Encounters:   17 240 lb 12.8 oz (109.2 kg)   17 247 lb (112 kg)   17 259 lb (117.5 kg)              We Performed the Following     Lipid panel reflex to direct LDL          Today's Medication Changes          These changes are accurate as of: 17 10:02 AM.  If you have any questions, ask your nurse or doctor.               Start taking these medicines.        Dose/Directions    sildenafil 100 MG cap/tab   Commonly known as:  REVATIO/VIAGRA   Used for:  Erectile dysfunction due to diseases classified elsewhere   Started by:  FRANCISCO Licona MD        Dose:   mg   Take 0.5-1 tablets ( mg) by mouth daily as needed for erectile dysfunction Take 30 min to 4 hours before intercourse.  Never use with nitroglycerin, terazosin or doxazosin.   Quantity:  6 tablet   Refills:  11            Where to get your medicines      These medications were sent to [facility] [place], [place] - [address]  [address] [place], [facility] [address]     Phone:  [phone]     lisinopril 2.5 MG tablet    metFORMIN 500 MG 24 hr tablet    rosuvastatin 40 MG tablet         Call your pharmacy to confirm that your medication is ready for pickup. It may take up to 24 hours for them to receive " the prescription. If the prescription is not ready within 3 business days, please contact your clinic or your provider.     We will let you know when these medications are ready. If you don't hear back within 3 business days, please contact us.     sildenafil 100 MG cap/tab                Primary Care Provider Office Phone # Fax #    Genet Ortiz [phone]       [facility] [address]  [facility] [address]        Equal Access to Services     TOMASA CAMPOS : Hadii aad ku hadasho Soomaali, waaxda luqadaha, qaybta kaalmada adeegyada, waxay idiin hayaan adeeg kharash laaddison carranza. So [facility] [phone].    ATENCIÓN: Si gisela tobin, tiene a king disposición servicios gratuitos de asistencia lingüística. [facility] [phone].    We comply with applicable federal civil rights laws and [place] laws. We do not discriminate on the basis of race, color, national origin, age, disability sex, sexual orientation or gender identity.            Thank you!     Thank you for choosing [facility]  for your care. Our goal is always to provide you with excellent care. Hearing back from our patients is one way we can continue to improve our services. Please take a few minutes to complete the written survey that you may receive in the mail after your visit with us. Thank you!             Your Updated Medication List - Protect others around you: Learn how to safely use, store and throw away your medicines at www.disposemymeds.org.          This list is accurate as of: 7/20/17 10:02 AM.  Always use your most recent med list.                   Brand Name Dispense Instructions for use Diagnosis    ALPRAZolam 0.25 MG tablet    XANAX    15 tablet    Take 1 tablet (0.25 mg) by mouth 3 times daily as needed for anxiety    Anxiety       ASPIRIN EC PO      Take 325 mg by mouth daily        blood glucose lancing device     30 each    Use to test blood sugars 1 times daily or as directed.     Type 2 diabetes mellitus without complications (H)       blood glucose monitoring meter device kit     1 kit    Use to test blood sugars 1 times daily or as directed.    Type 2 diabetes mellitus without complications (H)       blood glucose monitoring test strip    ONE TOUCH ULTRA    1 Box    Use to test blood sugars 1 times daily or as directed.    Type 2 diabetes mellitus without complications (H)       FLUoxetine 10 MG capsule    PROzac    60 capsule    One capsule daily for 1 week, then increase to 2 capsules daily    Generalized anxiety disorder, Mild major depression (H)       hydrOXYzine 25 MG tablet    ATARAX    60 tablet    Take 1-2 tablets (25-50 mg) by mouth every 8 hours as needed for anxiety    Generalized anxiety disorder       lisinopril 2.5 MG tablet    PRINIVIL/Zestril    90 tablet    Take 1 tablet (2.5 mg) by mouth daily    Benign essential hypertension       metFORMIN 500 MG 24 hr tablet    GLUCOPHAGE-XR    90 tablet    Take 1 tablet (500 mg) by mouth every evening    Type 2 diabetes mellitus without complication, without long-term current use of insulin (H)       order for DME      Phi was set up on [employer]  Type [employer] Auto (System One 60 series)  Serial Number: [id number] Modem Number: [id number] Pressure: AUTO-TITRATE CPAP 7-12 cm H20 Mask of choice: AIRFIT F 10 FULL FACE MASK Size: MEDIUM HEATED TUBING AND MODEM PROVIDED        order for DME     1 each    Automated blood pressure cuff.    Essential hypertension, benign       pantoprazole 40 MG EC tablet    PROTONIX    30 tablet    Take 1 tablet (40 mg) by mouth daily Take 30-60 minutes before a meal.    Gastroesophageal reflux disease without esophagitis       rosuvastatin 40 MG tablet    CRESTOR    90 tablet    Take 1 tablet (40 mg) by mouth daily    Hyperlipidemia LDL goal <100       sildenafil 100 MG cap/tab    REVATIO/VIAGRA    6 tablet    Take 0.5-1 tablets ( mg) by mouth daily as needed for erectile  dysfunction Take 30 min to 4 hours before intercourse.  Never use with nitroglycerin, terazosin or doxazosin.    Erectile dysfunction due to diseases classified elsewhere

## 2017-07-20 NOTE — LETTER
[facility]  [address] [facility] [address]  Phone: [phone]      7/20/2017     Phi Ortiz Jr.  [address]  [place] [facility] [address]      Dear Phi:    Thank you for allowing me to participate in your care. Your recent test results were reviewed and listed below.  Your cholesterol levels are significantly higher than the last couple times. I am assuming this is because you ran out of her medication and had not been taking it recently. If that is the case, getting back on the medication will helptake care of the issue. If you have been on the Crestor regularly, then you really need to work on losing weight because you are already on the maximum dose     Your results are provided below for your review  Results for orders placed or performed in visit on 07/20/17   Lipid panel reflex to direct LDL   Result Value Ref Range    Cholesterol 220 (H) <200 mg/dL    Triglycerides 225 (H) <150 mg/dL    HDL Cholesterol 37 (L) >39 mg/dL    LDL Cholesterol Calculated 138 (H) <100 mg/dL    Non HDL Cholesterol 183 (H) <130 mg/dL                 Thank you for choosing [facility]. As a result, please continue with the treatment plan discussed in the office. Return as discussed or sooner if symptoms worsen or fail to improve. If you have any further questions or concerns, please do not hesitate to contact us.      Sincerely,        Dr. Min Licona

## 2017-07-26 RX ORDER — SILDENAFIL CITRATE 20 MG/1
TABLET ORAL
Qty: 36 TABLET | Refills: 11 | Status: SHIPPED | OUTPATIENT
Start: 2017-07-26 | End: 2018-05-08

## 2017-08-25 DIAGNOSIS — K21.9 GASTROESOPHAGEAL REFLUX DISEASE WITHOUT ESOPHAGITIS: ICD-10-CM

## 2017-08-28 NOTE — TELEPHONE ENCOUNTER
Pantoprazole      Last Written Prescription Date: 06/21/17  Last Fill Quantity: 30,  # refills: 1   Last Office Visit with [facility], [facility] or [facility] prescribing provider: 07/20/17

## 2017-08-29 RX ORDER — PANTOPRAZOLE SODIUM 40 MG/1
TABLET, DELAYED RELEASE ORAL
Qty: 90 TABLET | Refills: 2 | Status: SHIPPED | OUTPATIENT
Start: 2017-08-29 | End: 2018-09-13

## 2017-12-11 ENCOUNTER — OFFICE VISIT (OUTPATIENT)
Dept: FAMILY MEDICINE | Facility: CLINIC | Age: 47
End: 2017-12-11
Payer: COMMERCIAL

## 2017-12-11 VITALS
TEMPERATURE: 97.7 F | HEART RATE: 73 BPM | WEIGHT: 246 LBS | RESPIRATION RATE: 18 BRPM | BODY MASS INDEX: 34.31 KG/M2 | DIASTOLIC BLOOD PRESSURE: 87 MMHG | SYSTOLIC BLOOD PRESSURE: 133 MMHG

## 2017-12-11 DIAGNOSIS — I10 BENIGN ESSENTIAL HYPERTENSION: ICD-10-CM

## 2017-12-11 DIAGNOSIS — Z91.148 NONCOMPLIANCE WITH MEDICATION REGIMEN: ICD-10-CM

## 2017-12-11 DIAGNOSIS — R10.12 ABDOMINAL PAIN, LEFT UPPER QUADRANT: Primary | ICD-10-CM

## 2017-12-11 DIAGNOSIS — E78.5 HYPERLIPIDEMIA LDL GOAL <100: ICD-10-CM

## 2017-12-11 DIAGNOSIS — E11.9 TYPE 2 DIABETES MELLITUS WITHOUT COMPLICATION, WITHOUT LONG-TERM CURRENT USE OF INSULIN (H): ICD-10-CM

## 2017-12-11 LAB
ALBUMIN SERPL-MCNC: 4 G/DL (ref 3.4–5)
ALBUMIN UR-MCNC: NEGATIVE MG/DL
ALP SERPL-CCNC: 51 U/L (ref 40–150)
ALT SERPL W P-5'-P-CCNC: 35 U/L (ref 0–70)
ANION GAP SERPL CALCULATED.3IONS-SCNC: 6 MMOL/L (ref 3–14)
APPEARANCE UR: CLEAR
AST SERPL W P-5'-P-CCNC: 22 U/L (ref 0–45)
BACTERIA #/AREA URNS HPF: ABNORMAL /HPF
BASOPHILS # BLD AUTO: 0.1 10E9/L (ref 0–0.2)
BASOPHILS NFR BLD AUTO: 0.7 %
BILIRUB DIRECT SERPL-MCNC: 0.1 MG/DL (ref 0–0.2)
BILIRUB SERPL-MCNC: 0.6 MG/DL (ref 0.2–1.3)
BILIRUB UR QL STRIP: NEGATIVE
BUN SERPL-MCNC: 17 MG/DL (ref 7–30)
CALCIUM SERPL-MCNC: 9 MG/DL (ref 8.5–10.1)
CHLORIDE SERPL-SCNC: 106 MMOL/L (ref 94–109)
CO2 SERPL-SCNC: 26 MMOL/L (ref 20–32)
COLOR UR AUTO: YELLOW
CREAT SERPL-MCNC: 0.63 MG/DL (ref 0.66–1.25)
DIFFERENTIAL METHOD BLD: NORMAL
EOSINOPHIL # BLD AUTO: 0.1 10E9/L (ref 0–0.7)
EOSINOPHIL NFR BLD AUTO: 1.3 %
ERYTHROCYTE [DISTWIDTH] IN BLOOD BY AUTOMATED COUNT: 12.6 % (ref 10–15)
GFR SERPL CREATININE-BSD FRML MDRD: >90 ML/MIN/1.7M2
GLUCOSE SERPL-MCNC: 133 MG/DL (ref 70–99)
GLUCOSE UR STRIP-MCNC: NEGATIVE MG/DL
HCT VFR BLD AUTO: 48.9 % (ref 40–53)
HGB BLD-MCNC: 16.6 G/DL (ref 13.3–17.7)
HGB UR QL STRIP: ABNORMAL
KETONES UR STRIP-MCNC: NEGATIVE MG/DL
LEUKOCYTE ESTERASE UR QL STRIP: ABNORMAL
LYMPHOCYTES # BLD AUTO: 1.3 10E9/L (ref 0.8–5.3)
LYMPHOCYTES NFR BLD AUTO: 19.1 %
MCH RBC QN AUTO: 31 PG (ref 26.5–33)
MCHC RBC AUTO-ENTMCNC: 33.9 G/DL (ref 31.5–36.5)
MCV RBC AUTO: 91 FL (ref 78–100)
MONOCYTES # BLD AUTO: 0.6 10E9/L (ref 0–1.3)
MONOCYTES NFR BLD AUTO: 8.5 %
NEUTROPHILS # BLD AUTO: 4.9 10E9/L (ref 1.6–8.3)
NEUTROPHILS NFR BLD AUTO: 70.4 %
NITRATE UR QL: NEGATIVE
NON-SQ EPI CELLS #/AREA URNS LPF: ABNORMAL /LPF
PH UR STRIP: 5.5 PH (ref 5–7)
PLATELET # BLD AUTO: 201 10E9/L (ref 150–450)
POTASSIUM SERPL-SCNC: 4.3 MMOL/L (ref 3.4–5.3)
PROT SERPL-MCNC: 8.1 G/DL (ref 6.8–8.8)
RBC # BLD AUTO: 5.36 10E12/L (ref 4.4–5.9)
RBC #/AREA URNS AUTO: ABNORMAL /HPF
SODIUM SERPL-SCNC: 138 MMOL/L (ref 133–144)
SOURCE: ABNORMAL
SP GR UR STRIP: 1.02 (ref 1–1.03)
UROBILINOGEN UR STRIP-ACNC: 0.2 EU/DL (ref 0.2–1)
WBC # BLD AUTO: 7 10E9/L (ref 4–11)
WBC #/AREA URNS AUTO: ABNORMAL /HPF

## 2017-12-11 PROCEDURE — 36415 COLL VENOUS BLD VENIPUNCTURE: CPT | Performed by: FAMILY MEDICINE

## 2017-12-11 PROCEDURE — 85025 COMPLETE CBC W/AUTO DIFF WBC: CPT | Performed by: FAMILY MEDICINE

## 2017-12-11 PROCEDURE — 80048 BASIC METABOLIC PNL TOTAL CA: CPT | Performed by: FAMILY MEDICINE

## 2017-12-11 PROCEDURE — 80076 HEPATIC FUNCTION PANEL: CPT | Performed by: FAMILY MEDICINE

## 2017-12-11 PROCEDURE — 81001 URINALYSIS AUTO W/SCOPE: CPT | Performed by: FAMILY MEDICINE

## 2017-12-11 PROCEDURE — 99214 OFFICE O/P EST MOD 30 MIN: CPT | Performed by: FAMILY MEDICINE

## 2017-12-11 RX ORDER — ROSUVASTATIN CALCIUM 40 MG/1
40 TABLET, COATED ORAL DAILY
Qty: 90 TABLET | Refills: 3
Start: 2017-12-11 | End: 2018-04-11

## 2017-12-11 RX ORDER — METFORMIN HCL 500 MG
500 TABLET, EXTENDED RELEASE 24 HR ORAL EVERY EVENING
Qty: 90 TABLET | Refills: 1
Start: 2017-12-11 | End: 2018-04-11

## 2017-12-11 RX ORDER — LISINOPRIL 2.5 MG/1
2.5 TABLET ORAL DAILY
Qty: 90 TABLET | Refills: 1
Start: 2017-12-11 | End: 2018-04-11

## 2017-12-11 ASSESSMENT — PATIENT HEALTH QUESTIONNAIRE - PHQ9: SUM OF ALL RESPONSES TO PHQ QUESTIONS 1-9: 3

## 2017-12-11 NOTE — PROGRESS NOTES
SUBJECTIVE:   Phi Ortiz Jr. is a 47 year old male who presents to clinic today for the following health issues:      Chief Complaint   Patient presents with     Urinary Problem     abdominal pain left side on and off x 1 week .Bodyaches , tired .     Recheck Medication     patient has not been taking his medications because of his work schedule .       ABDOMINAL   PAIN     Onset: about 1 week     Description:   Character: Sharp and Dull ache  Location: left upper quadrant  Radiation: None    Intensity: moderate    Progression of Symptoms:  intermittent    Accompanying Signs & Symptoms:  Fever/Chills?: YES  Gas/Bloating: YES  Nausea: no   Vomitting: no   Diarrhea?: no   Constipation:no   Dysuria or Hematuria: no    History:   Trauma: no   Previous similar pain: no    Previous tests done: none    Precipitating factors:   Does the pain change with:     Food: no      BM: no     Urination: no     Alleviating factors:  none    Therapies Tried and outcome: tylenol - not much relief     LMP:  not applicable             Problem list and histories reviewed & adjusted, as indicated.  Additional history:         Reviewed and updated as needed this visit by clinical staffTobacco  Allergies  Meds  Med Hx  Surg Hx  Fam Hx  Soc Hx      Reviewed and updated as needed this visit by Provider      Further history obtained, clarified or corrected by physician:  He comes in because he's not been feeling well over the last week though it is difficult for him to describe. He says he just feels off. He does describe a mild discomfort in the left side of the abdomen and flank area and he says it's vaguely similar to when he had kidney stones many years ago. He has had a little discomfort with urinating but has noticed no blood. He has not had any fever, cough, sore throat, bowel changes, appetite change, shortness of breath, chest pain. He has been off his metformin, lisinopril, and Crestor for at least the last month just  because he got out of the habit of taking them.    OBJECTIVE:  /87  Pulse 73  Temp 97.7  F (36.5  C)  Resp 18  Wt 246 lb (111.6 kg)  BMI 34.31 kg/m2  LUNGS: clear to auscultation, normal breath sounds  CV: RRR without murmur  ABD: BS+, soft, nontender, no masses, no hepatosplenomegaly  EXTREMITIES: without joint tenderness, swelling or erythema.  No muscle tenderness or abnormality.  SKIN: No rashes or abnormalities  NEURO:non focal exam    UA: neg    ASSESSMENT:     Abdominal pain, left upper quadrant  Hyperlipidemia LDL goal <100  Type 2 diabetes mellitus without complication, without long-term current use of insulin (H)  Benign essential hypertension  Noncompliance with medication regimen    PLAN:  We will check further lab studies  He will get back on his medications  If labs are normal then he'll just monitor symptoms over the next week and if they persist or if new symptoms develop then he needs to return.    Orders Placed This Encounter     UA reflex to Microscopic and Culture     Urine Microscopic     CBC with platelets differential     Basic metabolic panel     Hepatic panel (Albumin, ALT, AST, Bili, Alk Phos, TP)     rosuvastatin (CRESTOR) 40 MG tablet     metFORMIN (GLUCOPHAGE-XR) 500 MG 24 hr tablet     lisinopril (PRINIVIL/ZESTRIL) 2.5 MG tablet

## 2017-12-11 NOTE — LETTER
[facility]  [address] [facility] [address]  Phone: [phone]    December 11, 2017        Phi Ortiz Jr.  [address]  [place] [address]          To whom it may concern:    RE: Phi Ortiz Jr.    Patient was seen and treated today at our clinic and missed work. 12/5/17-12/11/17   patient may return to work on 12/12/17    Please contact me for questions or concerns.      Sincerely,        Koby Galaviz MD

## 2017-12-11 NOTE — PATIENT INSTRUCTIONS
Thank you for choosing [facility].  You may be receiving a survey in the mail from [facility] regarding your visit today.  Please take a few minutes to complete and return the survey to let us know how we are doing.      Our Clinic hours are:  Mondays    7:20 am - 7 pm  Tues -  Fri  7:20 am - 5 pm    Clinic Phone: [phone]    The clinic lab opens at 7:30 am Mon - Fri and appointments are required.    [facility]. [phone]  Monday-Thursday 8 am - 7pm  Tues/Wed/Fri 8 am - 5:30 pm

## 2017-12-11 NOTE — MR AVS SNAPSHOT
After Visit Summary   12/11/2017    Phi Ortiz Jr.    MRN: [id number]           Patient Information     Date Of Birth          1970        Visit Information        Provider Department      12/11/2017 8:40 AM Koby Galaviz MD [facility]        Today's Diagnoses     Abdominal pain, left upper quadrant    -  1    Hyperlipidemia LDL goal <100        Type 2 diabetes mellitus without complication, without long-term current use of insulin (H)        Benign essential hypertension        Noncompliance with medication regimen          Care Instructions          Thank you for choosing [facility].  You may be receiving a survey in the mail from [employer] regarding your visit today.  Please take a few minutes to complete and return the survey to let us know how we are doing.      Our Clinic hours are:  Mondays    7:20 am - 7 pm  Tues -  Fri  7:20 am - 5 pm    Clinic Phone: [phone]    The clinic lab opens at 7:30 am Mon - Fri and appointments are required.    [facility]  Ph. [phone]  Monday-Thursday 8 am - 7pm  Tues/Wed/Fri 8 am - 5:30 pm                 Follow-ups after your visit        Who to contact     If you have questions or need follow up information about today's clinic visit or your schedule please contact [facility] directly at [phone].  Normal or non-critical lab and imaging results will be communicated to you by MyChart, letter or phone within 4 business days after the clinic has received the results. If you do not hear from us within 7 days, please contact the clinic through MyChart or phone. If you have a critical or abnormal lab result, we will notify you by phone as soon as possible.  Submit refill requests through [employer] or call your pharmacy and they will forward the refill request to us. Please allow 3 business days for your refill to be completed.          Additional Information About Your Visit       "  MyChart Information     [employer] lets you send messages to your doctor, view your test results, renew your prescriptions, schedule appointments and more. To sign up, go to [email or web address] . Click on \"Log in\" on the left side of the screen, which will take you to the Welcome page. Then click on \"Sign up Now\" on the right side of the page.     You will be asked to enter the access code listed below, as well as some personal information. Please follow the directions to create your username and password.     Your access code is: [email or web address]  Expires: 3/11/2018  9:34 AM     Your access code will  in 90 days. If you need help or a new code, please call your [facility] clinic or [phone].        Care EveryWhere ID     This is your Care EveryWhere ID. This could be used by other organizations to access your [facility] medical records  [id number]        Your Vitals Were     Pulse Temperature Respirations BMI (Body Mass Index)          73 97.7  F (36.5  C) 18 34.31 kg/m2         Blood Pressure from Last 3 Encounters:   17 133/87   17 122/78   17 123/83    Weight from Last 3 Encounters:   17 246 lb (111.6 kg)   17 240 lb 12.8 oz (109.2 kg)   17 247 lb (112 kg)              We Performed the Following     Basic metabolic panel     CBC with platelets differential     Hepatic panel (Albumin, ALT, AST, Bili, Alk Phos, TP)     UA reflex to Microscopic and Culture     Urine Microscopic          Where to get your medicines      Some of these will need a paper prescription and others can be bought over the counter.  Ask your nurse if you have questions.     You don't need a prescription for these medications     lisinopril 2.5 MG tablet    metFORMIN 500 MG 24 hr tablet    rosuvastatin 40 MG tablet          Primary Care Provider Office Phone # Fax #    Genet Ortiz [phone]       [address] [facility] [address]        Equal Access to Services     [facility] " GAAR : Hadii aad ku mark Modi, waaxda luqadaha, qaybta kaalmada tawanna, cristal chinyerein hayaacarmen ayala jose luis qi . So [facility] [phone].    ATENCIÓN: Si habla español, tiene a king disposición servicios gratuitos de asistencia lingüística. Llame al [phone].    We comply with applicable federal civil rights laws and [place] laws. We do not discriminate on the basis of race, color, national origin, age, disability, sex, sexual orientation, or gender identity.            Thank you!     Thank you for choosing [facility]  for your care. Our goal is always to provide you with excellent care. Hearing back from our patients is one way we can continue to improve our services. Please take a few minutes to complete the written survey that you may receive in the mail after your visit with us. Thank you!             Your Updated Medication List - Protect others around you: Learn how to safely use, store and throw away your medicines at www.disposemymeds.org.          This list is accurate as of: 12/11/17  9:52 AM.  Always use your most recent med list.                   Brand Name Dispense Instructions for use Diagnosis    ALPRAZolam 0.25 MG tablet    XANAX    15 tablet    Take 1 tablet (0.25 mg) by mouth 3 times daily as needed for anxiety    Anxiety       ASPIRIN EC PO      Take 325 mg by mouth daily        blood glucose lancing device     30 each    Use to test blood sugars 1 times daily or as directed.    Type 2 diabetes mellitus without complications (H)       blood glucose monitoring meter device kit     1 kit    Use to test blood sugars 1 times daily or as directed.    Type 2 diabetes mellitus without complications (H)       blood glucose monitoring test strip    ONETOUCH ULTRA    1 Box    Use to test blood sugars 1 times daily or as directed.    Type 2 diabetes mellitus without complications (H)       FLUoxetine 10 MG capsule    PROzac    60 capsule    One capsule daily for 1 week,  then increase to 2 capsules daily    Generalized anxiety disorder, Mild major depression (H)       hydrOXYzine 25 MG tablet    ATARAX    60 tablet    Take 1-2 tablets (25-50 mg) by mouth every 8 hours as needed for anxiety    Generalized anxiety disorder       lisinopril 2.5 MG tablet    PRINIVIL/Zestril    90 tablet    Take 1 tablet (2.5 mg) by mouth daily    Benign essential hypertension       metFORMIN 500 MG 24 hr tablet    GLUCOPHAGE-XR    90 tablet    Take 1 tablet (500 mg) by mouth every evening    Type 2 diabetes mellitus without complication, without long-term current use of insulin (H)       order for DME      Phi was set up on [employer]  Type [employer] Auto (System One 60 series)  Serial Number: [id number] Modem Number: [id number] Pressure: AUTO-TITRATE CPAP 7-12 cm H20 Mask of choice: AIRFIT F 10 FULL FACE MASK Size: MEDIUM HEATED TUBING AND MODEM PROVIDED        order for DME     1 each    Automated blood pressure cuff.    Essential hypertension, benign       pantoprazole 40 MG EC tablet    PROTONIX    90 tablet    TAKE 1 TABLET BY MOUTH DAILY, 30 TO 60 MINUTES BEFORE A MEAL.    Gastroesophageal reflux disease without esophagitis       rosuvastatin 40 MG tablet    CRESTOR    90 tablet    Take 1 tablet (40 mg) by mouth daily    Hyperlipidemia LDL goal <100       sildenafil 100 MG tablet    VIAGRA    6 tablet    Take 0.5-1 tablets ( mg) by mouth daily as needed for erectile dysfunction Take 30 min to 4 hours before intercourse.  Never use with nitroglycerin, terazosin or doxazosin.    Erectile dysfunction due to diseases classified elsewhere       sildenafil 20 MG tablet    REVATIO    36 tablet    Take 1 - 3 tablets as needed.  Never use with nitroglycerin, terazosin or doxazosin.    Erectile dysfunction due to diseases classified elsewhere

## 2017-12-11 NOTE — LETTER
December 12, 2017      Phi Ortiz Jr.  [address]  [place] [address]        Dear ,    We are writing to inform you of your test results.    Your test results fall within the exceptable range(s). Please continue with current treatment plan.    Results for orders placed or performed in visit on 12/11/17   UA reflex to Microscopic and Culture   Result Value Ref Range    Color Urine Yellow     Appearance Urine Clear     Glucose Urine Negative NEG^Negative mg/dL    Bilirubin Urine Negative NEG^Negative    Ketones Urine Negative NEG^Negative mg/dL    Specific Gravity Urine 1.020 1.003 - 1.035    Blood Urine Trace (A) NEG^Negative    pH Urine 5.5 5.0 - 7.0 pH    Protein Albumin Urine Negative NEG^Negative mg/dL    Urobilinogen Urine 0.2 0.2 - 1.0 EU/dL    Nitrite Urine Negative NEG^Negative    Leukocyte Esterase Urine Trace (A) NEG^Negative    Source Midstream Urine    Urine Microscopic   Result Value Ref Range    WBC Urine O - 2 OTO2^O - 2 /HPF    RBC Urine O - 2 OTO2^O - 2 /HPF    Squamous Epithelial /LPF Urine Few FEW^Few /LPF    Bacteria Urine Few (A) NEG^Negative /HPF   CBC with platelets differential   Result Value Ref Range    WBC 7.0 4.0 - 11.0 10e9/L    RBC Count 5.36 4.4 - 5.9 10e12/L    Hemoglobin 16.6 13.3 - 17.7 g/dL    Hematocrit 48.9 40.0 - 53.0 %    MCV 91 78 - 100 fl    MCH 31.0 26.5 - 33.0 pg    MCHC 33.9 31.5 - 36.5 g/dL    RDW 12.6 10.0 - 15.0 %    Platelet Count 201 150 - 450 10e9/L    Diff Method Automated Method     % Neutrophils 70.4 %    % Lymphocytes 19.1 %    % Monocytes 8.5 %    % Eosinophils 1.3 %    % Basophils 0.7 %    Absolute Neutrophil 4.9 1.6 - 8.3 10e9/L    Absolute Lymphocytes 1.3 0.8 - 5.3 10e9/L    Absolute Monocytes 0.6 0.0 - 1.3 10e9/L    Absolute Eosinophils 0.1 0.0 - 0.7 10e9/L    Absolute Basophils 0.1 0.0 - 0.2 10e9/L   Basic metabolic panel   Result Value Ref Range    Sodium 138 133 - 144 mmol/L    Potassium 4.3 3.4 - 5.3 mmol/L    Chloride 106 94  - 109 mmol/L    Carbon Dioxide 26 20 - 32 mmol/L    Anion Gap 6 3 - 14 mmol/L    Glucose 133 (H) 70 - 99 mg/dL    Urea Nitrogen 17 7 - 30 mg/dL    Creatinine 0.63 (L) 0.66 - 1.25 mg/dL    GFR Estimate >90 >60 mL/min/1.7m2    GFR Estimate If Black >90 >60 mL/min/1.7m2    Calcium 9.0 8.5 - 10.1 mg/dL   Hepatic panel (Albumin, ALT, AST, Bili, Alk Phos, TP)   Result Value Ref Range    Bilirubin Direct 0.1 0.0 - 0.2 mg/dL    Bilirubin Total 0.6 0.2 - 1.3 mg/dL    Albumin 4.0 3.4 - 5.0 g/dL    Protein Total 8.1 6.8 - 8.8 g/dL    Alkaline Phosphatase 51 40 - 150 U/L    ALT 35 0 - 70 U/L    AST 22 0 - 45 U/L         If you have any questions or concerns, please call the clinic at the number listed above.       Sincerely,        Koby Galaviz MD

## 2017-12-11 NOTE — NURSING NOTE
"Chief Complaint   Patient presents with     Urinary Problem     abdominal pain left side on and off x 1 week .Bodyaches , tired .     Recheck Medication     patient has not been taking his medications because of his work schedule .       Initial /87  Pulse 73  Temp 97.7  F (36.5  C)  Resp 18  Wt 246 lb (111.6 kg)  BMI 34.31 kg/m2 Estimated body mass index is 34.31 kg/(m^2) as calculated from the following:    Height as of 7/20/17: 5' 11\" (1.803 m).    Weight as of this encounter: 246 lb (111.6 kg).  Medication Reconciliation: complete   Billie Morales CMA      "

## 2017-12-11 NOTE — LETTER
[facility][note 3]  [address] [facility] [address]  Phone: [phone]      12/12/2017     Phi Ortiz Jr.  [address]  [place] [address]      Dear Phi:    Thank you for allowing me to participate in your care. Your recent test results were reviewed and listed below.      Your results are provided below for your review  Results for orders placed or performed in visit on 12/11/17   UA reflex to Microscopic and Culture   Result Value Ref Range    Color Urine Yellow     Appearance Urine Clear     Glucose Urine Negative NEG^Negative mg/dL    Bilirubin Urine Negative NEG^Negative    Ketones Urine Negative NEG^Negative mg/dL    Specific Gravity Urine 1.020 1.003 - 1.035    Blood Urine Trace (A) NEG^Negative    pH Urine 5.5 5.0 - 7.0 pH    Protein Albumin Urine Negative NEG^Negative mg/dL    Urobilinogen Urine 0.2 0.2 - 1.0 EU/dL    Nitrite Urine Negative NEG^Negative    Leukocyte Esterase Urine Trace (A) NEG^Negative    Source Midstream Urine    Urine Microscopic   Result Value Ref Range    WBC Urine O - 2 OTO2^O - 2 /HPF    RBC Urine O - 2 OTO2^O - 2 /HPF    Squamous Epithelial /LPF Urine Few FEW^Few /LPF    Bacteria Urine Few (A) NEG^Negative /HPF   CBC with platelets differential   Result Value Ref Range    WBC 7.0 4.0 - 11.0 10e9/L    RBC Count 5.36 4.4 - 5.9 10e12/L    Hemoglobin 16.6 13.3 - 17.7 g/dL    Hematocrit 48.9 40.0 - 53.0 %    MCV 91 78 - 100 fl    MCH 31.0 26.5 - 33.0 pg    MCHC 33.9 31.5 - 36.5 g/dL    RDW 12.6 10.0 - 15.0 %    Platelet Count 201 150 - 450 10e9/L    Diff Method Automated Method     % Neutrophils 70.4 %    % Lymphocytes 19.1 %    % Monocytes 8.5 %    % Eosinophils 1.3 %    % Basophils 0.7 %    Absolute Neutrophil 4.9 1.6 - 8.3 10e9/L    Absolute Lymphocytes 1.3 0.8 - 5.3 10e9/L    Absolute Monocytes 0.6 0.0 - 1.3 10e9/L    Absolute Eosinophils 0.1 0.0 - 0.7 10e9/L    Absolute Basophils 0.1 0.0 - 0.2 10e9/L   Basic metabolic panel   Result Value  Ref Range    Sodium 138 133 - 144 mmol/L    Potassium 4.3 3.4 - 5.3 mmol/L    Chloride 106 94 - 109 mmol/L    Carbon Dioxide 26 20 - 32 mmol/L    Anion Gap 6 3 - 14 mmol/L    Glucose 133 (H) 70 - 99 mg/dL    Urea Nitrogen 17 7 - 30 mg/dL    Creatinine 0.63 (L) 0.66 - 1.25 mg/dL    GFR Estimate >90 >60 mL/min/1.7m2    GFR Estimate If Black >90 >60 mL/min/1.7m2    Calcium 9.0 8.5 - 10.1 mg/dL   Hepatic panel (Albumin, ALT, AST, Bili, Alk Phos, TP)   Result Value Ref Range    Bilirubin Direct 0.1 0.0 - 0.2 mg/dL    Bilirubin Total 0.6 0.2 - 1.3 mg/dL    Albumin 4.0 3.4 - 5.0 g/dL    Protein Total 8.1 6.8 - 8.8 g/dL    Alkaline Phosphatase 51 40 - 150 U/L    ALT 35 0 - 70 U/L    AST 22 0 - 45 U/L              inform patient tests are acceptable            Thank you for choosing [facility]. As a result, please continue with the treatment plan discussed in the office. Return as discussed or sooner if symptoms worsen or fail to improve. If you have any further questions or concerns, please do not hesitate to contact us.      Sincerely,        Dr. Koby Galaviz/

## 2018-04-11 ENCOUNTER — OFFICE VISIT (OUTPATIENT)
Dept: FAMILY MEDICINE | Facility: CLINIC | Age: 48
End: 2018-04-11
Payer: COMMERCIAL

## 2018-04-11 ENCOUNTER — HOSPITAL ENCOUNTER (OUTPATIENT)
Dept: ULTRASOUND IMAGING | Facility: CLINIC | Age: 48
Discharge: HOME | End: 2018-04-11
Attending: NURSE PRACTITIONER | Admitting: NURSE PRACTITIONER
Payer: COMMERCIAL

## 2018-04-11 VITALS
TEMPERATURE: 98.7 F | DIASTOLIC BLOOD PRESSURE: 94 MMHG | OXYGEN SATURATION: 97 % | HEART RATE: 84 BPM | BODY MASS INDEX: 34.44 KG/M2 | RESPIRATION RATE: 16 BRPM | HEIGHT: 71 IN | WEIGHT: 246 LBS | SYSTOLIC BLOOD PRESSURE: 142 MMHG

## 2018-04-11 DIAGNOSIS — E78.1 HYPERTRIGLYCERIDEMIA: Chronic | ICD-10-CM

## 2018-04-11 DIAGNOSIS — J31.0 CHRONIC RHINITIS, UNSPECIFIED TYPE: ICD-10-CM

## 2018-04-11 DIAGNOSIS — I10 ESSENTIAL HYPERTENSION, BENIGN: Chronic | ICD-10-CM

## 2018-04-11 DIAGNOSIS — M79.661 RIGHT CALF PAIN: ICD-10-CM

## 2018-04-11 DIAGNOSIS — K21.9 GASTROESOPHAGEAL REFLUX DISEASE WITHOUT ESOPHAGITIS: Chronic | ICD-10-CM

## 2018-04-11 DIAGNOSIS — E11.9 TYPE 2 DIABETES MELLITUS WITHOUT COMPLICATION, WITHOUT LONG-TERM CURRENT USE OF INSULIN (H): Primary | Chronic | ICD-10-CM

## 2018-04-11 DIAGNOSIS — E78.5 HYPERLIPIDEMIA LDL GOAL <100: ICD-10-CM

## 2018-04-11 DIAGNOSIS — T14.8XXA BRUISING: ICD-10-CM

## 2018-04-11 DIAGNOSIS — I10 BENIGN ESSENTIAL HYPERTENSION: ICD-10-CM

## 2018-04-11 LAB
ALBUMIN SERPL-MCNC: 4.2 G/DL (ref 3.4–5)
ALP SERPL-CCNC: 47 U/L (ref 40–150)
ALT SERPL W P-5'-P-CCNC: 50 U/L (ref 0–70)
ANION GAP SERPL CALCULATED.3IONS-SCNC: 9 MMOL/L (ref 3–14)
AST SERPL W P-5'-P-CCNC: 28 U/L (ref 0–45)
BILIRUB SERPL-MCNC: 0.6 MG/DL (ref 0.2–1.3)
BUN SERPL-MCNC: 13 MG/DL (ref 7–30)
CALCIUM SERPL-MCNC: 8.9 MG/DL (ref 8.5–10.1)
CHLORIDE SERPL-SCNC: 108 MMOL/L (ref 94–109)
CO2 SERPL-SCNC: 25 MMOL/L (ref 20–32)
CREAT SERPL-MCNC: 0.69 MG/DL (ref 0.66–1.25)
ERYTHROCYTE [DISTWIDTH] IN BLOOD BY AUTOMATED COUNT: 12.7 % (ref 10–15)
GFR SERPL CREATININE-BSD FRML MDRD: >90 ML/MIN/1.7M2
GLUCOSE SERPL-MCNC: 142 MG/DL (ref 70–99)
HCT VFR BLD AUTO: 47.5 % (ref 40–53)
HGB BLD-MCNC: 16.8 G/DL (ref 13.3–17.7)
MCH RBC QN AUTO: 31.5 PG (ref 26.5–33)
MCHC RBC AUTO-ENTMCNC: 35.4 G/DL (ref 31.5–36.5)
MCV RBC AUTO: 89 FL (ref 78–100)
PLATELET # BLD AUTO: 168 10E9/L (ref 150–450)
POTASSIUM SERPL-SCNC: 4.2 MMOL/L (ref 3.4–5.3)
PROT SERPL-MCNC: 8.3 G/DL (ref 6.8–8.8)
RBC # BLD AUTO: 5.34 10E12/L (ref 4.4–5.9)
SODIUM SERPL-SCNC: 142 MMOL/L (ref 133–144)
TSH SERPL DL<=0.005 MIU/L-ACNC: 1.61 MU/L (ref 0.4–4)
WBC # BLD AUTO: 4.8 10E9/L (ref 4–11)

## 2018-04-11 PROCEDURE — 85027 COMPLETE CBC AUTOMATED: CPT | Performed by: NURSE PRACTITIONER

## 2018-04-11 PROCEDURE — 93971 EXTREMITY STUDY: CPT | Mod: RT

## 2018-04-11 PROCEDURE — 84443 ASSAY THYROID STIM HORMONE: CPT | Performed by: NURSE PRACTITIONER

## 2018-04-11 PROCEDURE — 80053 COMPREHEN METABOLIC PANEL: CPT | Performed by: NURSE PRACTITIONER

## 2018-04-11 PROCEDURE — 36415 COLL VENOUS BLD VENIPUNCTURE: CPT | Performed by: NURSE PRACTITIONER

## 2018-04-11 PROCEDURE — 99214 OFFICE O/P EST MOD 30 MIN: CPT | Performed by: NURSE PRACTITIONER

## 2018-04-11 RX ORDER — METFORMIN HCL 500 MG
500 TABLET, EXTENDED RELEASE 24 HR ORAL EVERY EVENING
Qty: 90 TABLET | Refills: 1 | Status: SHIPPED | OUTPATIENT
Start: 2018-04-11 | End: 2018-05-08

## 2018-04-11 RX ORDER — ROSUVASTATIN CALCIUM 40 MG/1
40 TABLET, COATED ORAL DAILY
Qty: 90 TABLET | Refills: 3 | Status: SHIPPED | OUTPATIENT
Start: 2018-04-11 | End: 2019-01-17

## 2018-04-11 RX ORDER — FLUTICASONE PROPIONATE 50 MCG
1-2 SPRAY, SUSPENSION (ML) NASAL DAILY
Qty: 1 BOTTLE | Refills: 1 | Status: SHIPPED | OUTPATIENT
Start: 2018-04-11 | End: 2018-11-12

## 2018-04-11 RX ORDER — LISINOPRIL 10 MG/1
10 TABLET ORAL DAILY
Qty: 90 TABLET | Refills: 1 | Status: SHIPPED | OUTPATIENT
Start: 2018-04-11 | End: 2018-09-13 | Stop reason: SINTOL

## 2018-04-11 RX ORDER — CALCIUM CARBONATE 500 MG/1
1 TABLET, CHEWABLE ORAL DAILY PRN
COMMUNITY
End: 2018-05-08

## 2018-04-11 RX ORDER — LISINOPRIL 2.5 MG/1
2.5 TABLET ORAL DAILY
Qty: 90 TABLET | Refills: 1 | Status: SHIPPED | OUTPATIENT
Start: 2018-04-11 | End: 2018-04-13 | Stop reason: DRUGHIGH

## 2018-04-11 RX ORDER — ROSUVASTATIN CALCIUM 40 MG/1
40 TABLET, COATED ORAL DAILY
Qty: 90 TABLET | Refills: 3 | Status: SHIPPED | OUTPATIENT
Start: 2018-04-11 | End: 2018-04-13

## 2018-04-11 RX ORDER — METFORMIN HCL 500 MG
500 TABLET, EXTENDED RELEASE 24 HR ORAL EVERY EVENING
Qty: 90 TABLET | Refills: 1 | Status: SHIPPED | OUTPATIENT
Start: 2018-04-11 | End: 2018-11-12

## 2018-04-11 RX ORDER — PANTOPRAZOLE SODIUM 40 MG/1
40 TABLET, DELAYED RELEASE ORAL DAILY
COMMUNITY
Start: 2017-08-29 | End: 2018-04-13

## 2018-04-11 RX ORDER — ASPIRIN 81 MG/1
325 TABLET ORAL DAILY
Qty: 360 TABLET | Refills: 3 | Status: SHIPPED | OUTPATIENT
Start: 2018-04-11

## 2018-04-11 NOTE — NURSING NOTE
"Chief Complaint   Patient presents with     URI       Initial BP (!) 146/95 (BP Location: Right arm, Patient Position: Chair, Cuff Size: Adult Large)  Pulse 84  Temp 98.7  F (37.1  C) (Oral)  Resp 16  Ht 5' 11\" (1.803 m)  Wt 246 lb (111.6 kg)  SpO2 97%  BMI 34.31 kg/m2 Estimated body mass index is 34.31 kg/(m^2) as calculated from the following:    Height as of this encounter: 5' 11\" (1.803 m).    Weight as of this encounter: 246 lb (111.6 kg).  Medication Reconciliation: complete  "

## 2018-04-11 NOTE — MR AVS SNAPSHOT
After Visit Summary   4/11/2018    Phi Ortiz Jr.    MRN: [id number]           Patient Information     Date Of Birth          1970        Visit Information        Provider Department      4/11/2018 11:00 AM Mckenna Caldwell APRN CNP [facility]        Today's Diagnoses     Type 2 diabetes mellitus without complication, without long-term current use of insulin (H)    -  1    Gastroesophageal reflux disease without esophagitis        Hypertriglyceridemia        Essential hypertension, benign        Right calf pain        Bruising        Hyperlipidemia LDL goal <100        Benign essential hypertension        Chronic rhinitis, unspecified type          Care Instructions    Resume your medications daily and continue with those.  Will be notified of pending labs.  Will make sure you don't have a clot in your right leg.  Follow up in two weeks.  Follow up if symptoms persist or worsen and as needed.        Thank you for choosing [facility].  You may be receiving a survey in the mail from [employer] regarding your visit today.  Please take a few minutes to complete and return the survey to let us know how we are doing.      Our Clinic hours are:  Mondays    7:20 am - 7 pm  Tues -  Fri  7:20 am - 5 pm    Clinic Phone: [phone]    The clinic lab opens at 7:30 am Mon - Fri and appointments are required.    [facility]  Ph. [phone]  Monday-Thursday 8 am - 7pm  Tues/Wed/Fri 8 am - 5:30 pm               Follow-ups after your visit        Follow-up notes from your care team     Return in about 2 weeks (around 4/25/2018), or if symptoms worsen or fail to improve.      Your next 10 appointments already scheduled     Apr 11, 2018 12:30 PM CDT   US LOWER EXTREMITY VENOUS DUPLEX RIGHT with [id number]   [facility] ([facility])    [address] [facility] [address] [phone]           Please bring a list of your  medicines (including vitamins, minerals and over-the-counter drugs). Also, tell your doctor about any allergies you may have. Wear comfortable clothes and leave your valuables at home.  You do not need to do anything special to prepare for your exam.  Please call the Imaging Department at your exam site with any questions.            Apr 18, 2018 12:00 PM CDT   Office Visit with MICHELLE Garcia CNP   [facility] ([facility])    [address]  [facility] [address] [phone]           Bring a current list of meds and any records pertaining to this visit. For Physicals, please bring immunization records and any forms needing to be filled out. Please arrive 10 minutes early to complete paperwork.              Future tests that were ordered for you today     Open Future Orders        Priority Expected Expires Ordered    US Lower Extremity Venous Duplex Right Routine  4/11/2019 4/11/2018    Lipid panel reflex to direct LDL Fasting Routine 3/12/2019 4/11/2019 4/11/2018            Who to contact     If you have questions or need follow up information about today's clinic visit or your schedule please contact [facility] directly at [phone].  Normal or non-critical lab and imaging results will be communicated to you by MyChart, letter or phone within 4 business days after the clinic has received the results. If you do not hear from us within 7 days, please contact the clinic through [employer] or phone. If you have a critical or abnormal lab result, we will notify you by phone as soon as possible.  Submit refill requests through [employer] or call your pharmacy and they will forward the refill request to us. Please allow 3 business days for your refill to be completed.          Additional Information About Your Visit        [employer] Information     [employer] lets you send messages to your doctor, view your test results, renew your prescriptions,  "schedule appointments and more. To sign up, go to [email or web address] . Click on \"Log in\" on the left side of the screen, which will take you to the Welcome page. Then click on \"Sign up Now\" on the right side of the page.     You will be asked to enter the access code listed below, as well as some personal information. Please follow the directions to create your username and password.     Your access code is: [email or web address]  Expires: 7/10/2018 11:42 AM     Your access code will  in 90 days. If you need help or a new code, please call your [facility] clinic or [phone].        Care EveryWhere ID     This is your Care EveryWhere ID. This could be used by other organizations to access your [facility] medical records  [id number]        Your Vitals Were     Pulse Temperature Respirations Height Pulse Oximetry BMI (Body Mass Index)    84 98.7  F (37.1  C) (Oral) 16 5' 11\" (1.803 m) 97% 34.31 kg/m2       Blood Pressure from Last 3 Encounters:   18 (!) 142/94   17 133/87   17 122/78    Weight from Last 3 Encounters:   18 246 lb (111.6 kg)   17 246 lb (111.6 kg)   17 240 lb 12.8 oz (109.2 kg)              We Performed the Following     CBC with platelets     Comprehensive metabolic panel     TSH with free T4 reflex          Today's Medication Changes          These changes are accurate as of 18 11:42 AM.  If you have any questions, ask your nurse or doctor.               Start taking these medicines.        Dose/Directions    fluticasone 50 MCG/ACT spray   Commonly known as:  FLONASE   Used for:  Chronic rhinitis, unspecified type   Started by:  Mckenna Caldwell APRN CNP        Dose:  1-2 spray   Spray 1-2 sprays into both nostrils daily   Quantity:  1 Bottle   Refills:  1       * metFORMIN 500 MG 24 hr tablet   Commonly known as:  GLUCOPHAGE-XR   Used for:  Type 2 diabetes mellitus without complication, without long-term current use of insulin (H)   Started by:  Javi" MICHELLE Sims CNP        Dose:  500 mg   Take 1 tablet (500 mg) by mouth every evening   Quantity:  90 tablet   Refills:  1       * metFORMIN 500 MG 24 hr tablet   Commonly known as:  GLUCOPHAGE-XR   Used for:  Type 2 diabetes mellitus without complication, without long-term current use of insulin (H)   Started by:  Mckenna Caldwell APRN CNP        Dose:  500 mg   Take 1 tablet (500 mg) by mouth every evening   Quantity:  90 tablet   Refills:  1       * rosuvastatin 40 MG tablet   Commonly known as:  CRESTOR   Used for:  Hyperlipidemia LDL goal <100   Started by:  Mckenna Caldwell APRN CNP        Dose:  40 mg   Take 1 tablet (40 mg) by mouth daily   Quantity:  90 tablet   Refills:  3       * rosuvastatin 40 MG tablet   Commonly known as:  CRESTOR   Used for:  Hyperlipidemia LDL goal <100   Started by:  Mckenna Caldwell APRN CNP        Dose:  40 mg   Take 1 tablet (40 mg) by mouth daily   Quantity:  90 tablet   Refills:  3       * Notice:  This list has 4 medication(s) that are the same as other medications prescribed for you. Read the directions carefully, and ask your doctor or other care provider to review them with you.      These medicines have changed or have updated prescriptions.        Dose/Directions    * lisinopril 10 MG tablet   Commonly known as:  PRINIVIL/ZESTRIL   This may have changed:  You were already taking a medication with the same name, and this prescription was added. Make sure you understand how and when to take each.   Used for:  Essential hypertension, benign   Changed by:  Mckenna Caldwell APRN CNP        Dose:  10 mg   Take 1 tablet (10 mg) by mouth daily   Quantity:  90 tablet   Refills:  1       * lisinopril 2.5 MG tablet   Commonly known as:  PRINIVIL/Zestril   This may have changed:  Another medication with the same name was added. Make sure you understand how and when to take each.   Used for:  Benign essential hypertension   Changed by:  Mckenna Caldwell APRN  CNP        Dose:  2.5 mg   Take 1 tablet (2.5 mg) by mouth daily   Quantity:  90 tablet   Refills:  1       * Notice:  This list has 2 medication(s) that are the same as other medications prescribed for you. Read the directions carefully, and ask your doctor or other care provider to review them with you.         Where to get your medicines      [facility] [place] [facility] [place] [facility] [address] [address] [facility]  [address] [facility] [address]     Phone:  [phone]     aspirin EC 81 MG EC tablet    fluticasone 50 MCG/ACT spray    lisinopril 10 MG tablet    lisinopril 2.5 MG tablet    metFORMIN 500 MG 24 hr tablet    metFORMIN 500 MG 24 hr tablet    rosuvastatin 40 MG tablet    rosuvastatin 40 MG tablet                Primary Care Provider Office Phone # Fax #    Genet Ortiz, [phone]       [address] [facility] [address]        Equal Access to Services     TOMASA CAMPOS AH: Hadii aad ku hadasho Soomaali, waaxda luqadaha, qaybta kaalmada adeegyada, waxay idiin hayaan adeeg jose luis busby . So [facility] [place] [phone].    ATENCIÓN: Si habla español, tiene a king disposición servicios gratuitos de asistencia lingüística. [facility] [phone].    We comply with applicable federal civil rights laws and [place] laws. We do not discriminate on the basis of race, color, national origin, age, disability, sex, sexual orientation, or gender identity.            Thank you!     Thank you for choosing [facility]  for your care. Our goal is always to provide you with excellent care. Hearing back from our patients is one way we can continue to improve our services. Please take a few minutes to complete the written survey that you may receive in the mail after your visit with us. Thank you!             Your Updated Medication List - Protect others around you: Learn how to safely use, store and throw away your medicines at  www.disposemymeds.org.          This list is accurate as of 4/11/18 11:42 AM.  Always use your most recent med list.                   Brand Name Dispense Instructions for use Diagnosis    ALPRAZolam 0.25 MG tablet    XANAX    15 tablet    Take 1 tablet (0.25 mg) by mouth 3 times daily as needed for anxiety    Anxiety       aspirin EC 81 MG EC tablet     360 tablet    Take 4 tablets (325 mg) by mouth daily    Type 2 diabetes mellitus without complication, without long-term current use of insulin (H)       blood glucose lancing device     30 each    Use to test blood sugars 1 times daily or as directed.    Type 2 diabetes mellitus without complications (H)       blood glucose monitoring meter device kit     1 kit    Use to test blood sugars 1 times daily or as directed.    Type 2 diabetes mellitus without complications (H)       blood glucose monitoring test strip    ONETOUCH ULTRA    1 Box    Use to test blood sugars 1 times daily or as directed.    Type 2 diabetes mellitus without complications (H)       calcium carbonate 500 MG chewable tablet    TUMS     Take 1 chew tab by mouth daily as needed for heartburn        FLUoxetine 10 MG capsule    PROzac    60 capsule    One capsule daily for 1 week, then increase to 2 capsules daily    Generalized anxiety disorder, Mild major depression (H)       fluticasone 50 MCG/ACT spray    FLONASE    1 Bottle    Spray 1-2 sprays into both nostrils daily    Chronic rhinitis, unspecified type       hydrOXYzine 25 MG tablet    ATARAX    60 tablet    Take 1-2 tablets (25-50 mg) by mouth every 8 hours as needed for anxiety    Generalized anxiety disorder       * lisinopril 10 MG tablet    PRINIVIL/ZESTRIL    90 tablet    Take 1 tablet (10 mg) by mouth daily    Essential hypertension, benign       * lisinopril 2.5 MG tablet    PRINIVIL/Zestril    90 tablet    Take 1 tablet (2.5 mg) by mouth daily    Benign essential hypertension       * metFORMIN 500 MG 24 hr tablet    GLUCOPHAGE-XR     90 tablet    Take 1 tablet (500 mg) by mouth every evening    Type 2 diabetes mellitus without complication, without long-term current use of insulin (H)       * metFORMIN 500 MG 24 hr tablet    GLUCOPHAGE-XR    90 tablet    Take 1 tablet (500 mg) by mouth every evening    Type 2 diabetes mellitus without complication, without long-term current use of insulin (H)       order for DME      Phi was set up on [employer]  Type [employer] Auto (System One 60 series)  Serial Number: [id number] Modem Number: [id number] Pressure: AUTO-TITRATE CPAP 7-12 cm H20 Mask of choice: AIRFIT F 10 FULL FACE MASK Size: MEDIUM HEATED TUBING AND MODEM PROVIDED        order for DME     1 each    Automated blood pressure cuff.    Essential hypertension, benign       * pantoprazole 40 MG EC tablet    PROTONIX    90 tablet    TAKE 1 TABLET BY MOUTH DAILY, 30 TO 60 MINUTES BEFORE A MEAL.    Gastroesophageal reflux disease without esophagitis       * pantoprazole 40 MG EC tablet    PROTONIX     Take 40 mg by mouth daily        * rosuvastatin 40 MG tablet    CRESTOR    90 tablet    Take 1 tablet (40 mg) by mouth daily    Hyperlipidemia LDL goal <100       * rosuvastatin 40 MG tablet    CRESTOR    90 tablet    Take 1 tablet (40 mg) by mouth daily    Hyperlipidemia LDL goal <100       sildenafil 100 MG tablet    VIAGRA    6 tablet    Take 0.5-1 tablets ( mg) by mouth daily as needed for erectile dysfunction Take 30 min to 4 hours before intercourse.  Never use with nitroglycerin, terazosin or doxazosin.    Erectile dysfunction due to diseases classified elsewhere       sildenafil 20 MG tablet    REVATIO    36 tablet    Take 1 - 3 tablets as needed.  Never use with nitroglycerin, terazosin or doxazosin.    Erectile dysfunction due to diseases classified elsewhere       * Notice:  This list has 8 medication(s) that are the same as other medications prescribed for you. Read the directions carefully, and ask your doctor or other  care provider to review them with you.

## 2018-04-11 NOTE — LETTER
[facility]  [address] [facility] [address]  Phone: [phone]    April 11, 2018        Phi Ortiz Jr.  [address]  [place] [address]          To whom it may concern:    RE: Phi Ortiz Jr.    Patient was seen and treated today at our clinic and missed work today.    Please contact me for questions or concerns.      Sincerely,        MICHELLE Garcia CNP

## 2018-04-11 NOTE — PROGRESS NOTES
"  SUBJECTIVE:   Phi Ortiz Jr. is a 47 year old male who presents to clinic today for the following health issues:  he is only on the protonix and occasional Tums.    RESPIRATORY SYMPTOMS      Duration: 1 week    Description  nasal congestion, sore throat, facial pain/pressure, cough, wheezing, headache, fatigue/malaise, hoarse voice, myalgias, nausea and stomach ache    Severity: moderate    Accompanying signs and symptoms: he is diabetic and has been off all medications for at least 1 month.    History (predisposing factors):  Works in a very walter environment and daughter had bronchitis and pneumonia.    Precipitating or alleviating factors: he fell 1 week ago Friday and his lower leg is black and blue and his left knee and hip are still hurting if he sits.    Therapies tried and outcome:  He did go to [facility] and they RX him an inhaler Robitussin AC, he did not fill the inhaler RX because it was $30 and they said his lungs sounded good.          Problem list and histories reviewed & adjusted, as indicated.  Additional history: states he stopped all his medications about one month ago, no good reasoning's for that other than \"life\"  He was going through divorce but now he and his wife are trying to work it out. He works in [place] and feels it's a very irritating environment, he's had a lot of nasal drainage and congestion. He wasn't happy about visit to [facility] for these concerns.  History of GERD, HTN, Hyperlipidemia, fatty liver, diabetes and CAD.  He states he will see cardiology soon.  He seems to be not fully understanding of concerns with managing his chronic health issues.  He tripped over his dog about 10 days ago and now has developed bruising later and pain in his right leg. No shortness of breath.  A lot of concerns today and he is in need of establishing ongoing care. He thinks he will do that here.      Patient Active Problem List   Diagnosis     GERD (gastroesophageal reflux disease)     " Generalized anxiety disorder     Fatty liver     Family history of coronary artery disease     Hypertriglyceridemia     Vitamin D deficiency     Chest pain     Chronic ischemic heart disease     Hyperlipidemia LDL goal <70     Type 2 diabetes mellitus without complications (H)     Mild major depression (H)     overweight BMI greater than 35     Essential hypertension, benign     Past Surgical History:   Procedure Laterality Date     NO HISTORY OF SURGERY         Social History   Substance Use Topics     Smoking status: Never Smoker     Smokeless tobacco: Former User     Alcohol use Yes      Comment: 24 pack a month, intermittent drinks based off the shift (1st, 2nd or 3rd) he's working that week.     Family History   Problem Relation Age of Onset     C.A.D. Father      heart attack 30's     CEREBROVASCULAR DISEASE Father      DIABETES Father      Hypertension Father      Hypertension Mother      GASTROINTESTINAL DISEASE Mother      colitis     DIABETES Maternal Grandmother      snores     Dementia Maternal Grandmother      C.A.D. Maternal Grandfather      MI, leg circulation problems     C.A.D. Paternal Grandfather      MI in 30s, snores     Neurologic Disorder Sister      vertigo.  fibromyalgia     Hyperlipidemia Daughter      Breast Cancer No family hx of      Cancer - colorectal No family hx of      Prostate Cancer No family hx of          Current Outpatient Prescriptions   Medication Sig Dispense Refill     pantoprazole (PROTONIX) 40 MG EC tablet Take 40 mg by mouth daily       calcium carbonate (TUMS) 500 MG chewable tablet Take 1 chew tab by mouth daily as needed for heartburn       rosuvastatin (CRESTOR) 40 MG tablet Take 1 tablet (40 mg) by mouth daily 90 tablet 3     metFORMIN (GLUCOPHAGE-XR) 500 MG 24 hr tablet Take 1 tablet (500 mg) by mouth every evening 90 tablet 1     lisinopril (PRINIVIL/ZESTRIL) 10 MG tablet Take 1 tablet (10 mg) by mouth daily 90 tablet 1     aspirin EC 81 MG EC tablet Take 4  tablets (325 mg) by mouth daily 360 tablet 3     rosuvastatin (CRESTOR) 40 MG tablet Take 1 tablet (40 mg) by mouth daily 90 tablet 3     metFORMIN (GLUCOPHAGE-XR) 500 MG 24 hr tablet Take 1 tablet (500 mg) by mouth every evening 90 tablet 1     lisinopril (PRINIVIL/ZESTRIL) 2.5 MG tablet Take 1 tablet (2.5 mg) by mouth daily 90 tablet 1     fluticasone (FLONASE) 50 MCG/ACT spray Spray 1-2 sprays into both nostrils daily 1 Bottle 1     pantoprazole (PROTONIX) 40 MG EC tablet TAKE 1 TABLET BY MOUTH DAILY, 30 TO 60 MINUTES BEFORE A MEAL. 90 tablet 2     [DISCONTINUED] rosuvastatin (CRESTOR) 40 MG tablet Take 1 tablet (40 mg) by mouth daily (Patient not taking: Reported on 4/11/2018) 90 tablet 3     [DISCONTINUED] metFORMIN (GLUCOPHAGE-XR) 500 MG 24 hr tablet Take 1 tablet (500 mg) by mouth every evening (Patient not taking: Reported on 4/11/2018) 90 tablet 1     [DISCONTINUED] lisinopril (PRINIVIL/ZESTRIL) 2.5 MG tablet Take 1 tablet (2.5 mg) by mouth daily (Patient not taking: Reported on 4/11/2018) 90 tablet 1     sildenafil (REVATIO/VIAGRA) 20 MG tablet Take 1 - 3 tablets as needed.  Never use with nitroglycerin, terazosin or doxazosin. (Patient not taking: Reported on 12/11/2017) 36 tablet 11     sildenafil (REVATIO/VIAGRA) 100 MG cap/tab Take 0.5-1 tablets ( mg) by mouth daily as needed for erectile dysfunction Take 30 min to 4 hours before intercourse.  Never use with nitroglycerin, terazosin or doxazosin. (Patient not taking: Reported on 12/11/2017) 6 tablet 11     FLUoxetine (PROZAC) 10 MG capsule One capsule daily for 1 week, then increase to 2 capsules daily (Patient not taking: Reported on 12/11/2017) 60 capsule 1     hydrOXYzine (ATARAX) 25 MG tablet Take 1-2 tablets (25-50 mg) by mouth every 8 hours as needed for anxiety (Patient not taking: Reported on 4/11/2018) 60 tablet 1     ALPRAZolam (XANAX) 0.25 MG tablet Take 1 tablet (0.25 mg) by mouth 3 times daily as needed for anxiety (Patient not  taking: Reported on 12/11/2017) 15 tablet 0     order for DME Automated blood pressure cuff. (Patient not taking: Reported on 12/11/2017) 1 each 0     blood glucose monitoring (ONE TOUCH ULTRA 2) meter device kit Use to test blood sugars 1 times daily or as directed. (Patient not taking: Reported on 12/11/2017) 1 kit 0     blood glucose (ONE TOUCH DELICA) lancing device Use to test blood sugars 1 times daily or as directed. (Patient not taking: Reported on 12/11/2017) 30 each 6     blood glucose monitoring (ONE TOUCH ULTRA) test strip Use to test blood sugars 1 times daily or as directed. (Patient not taking: Reported on 12/11/2017) 1 Box 6     ORDER FOR DME, SET TO FAX, Phi was set up on [place]  Type [employer] Auto (System One 60 series)  Serial Number: [id number] Modem Number: [id number] Pressure: AUTO-TITRATE CPAP 7-12 cm H20 Mask of choice: AIRFIT F 10 FULL FACE MASK Size: MEDIUM HEATED TUBING AND MODEM PROVIDED       No Known Allergies  Recent Labs   Lab Test  12/11/17   0935  07/20/17   1004  06/12/17   0434  06/02/17   1159   10/06/16   1115  08/17/16   1350   03/03/16   1419   11/23/15   0859   A1C   --    --    --   7.0*   --   6.1*   --    --   7.9*   --   6.8*   LDL   --   138*   --    --    --    --    --    --   85   --   109*   HDL   --   37*   --    --    --    --    --    --   32*   --   32*   TRIG   --   225*   --    --    --    --    --    --   247*   --   201*   ALT  35   --   55   --    --    --   34   --    --    --   52   CR  0.63*   --   0.75   --    < >  0.62*  0.68   < >  0.61*   < >  0.68   GFRESTIMATED  >90   --   >90  Non  GFR Calc     --    < >  >90  Non  GFR Calc    >90  Non  GFR Calc     < >  >90  Non  GFR Calc     < >  >90  Non  GFR Calc     GFRESTBLACK  >90   --   >90   GFR Calc     --    < >  >90   GFR Calc    >90   GFR Calc     < >   ">90   GFR Calc     < >  >90   GFR Calc     POTASSIUM  4.3   --   4.0   --    < >  3.9  3.8   < >  4.0   < >  4.0   TSH   --    --    --    --    --   1.17   --    --    --    --   1.28    < > = values in this interval not displayed.      BP Readings from Last 3 Encounters:   04/11/18 (!) 142/94   12/11/17 133/87   07/20/17 122/78    Wt Readings from Last 3 Encounters:   04/11/18 246 lb (111.6 kg)   12/11/17 246 lb (111.6 kg)   07/20/17 240 lb 12.8 oz (109.2 kg)                    Reviewed and updated as needed this visit by clinical staff  Tobacco  Allergies  Meds  Med Hx  Surg Hx  Fam Hx  Soc Hx      Reviewed and updated as needed this visit by Provider         10 point ROS of systems including Constitutional, Eyes, Respiratory, Cardiovascular, Gastroenterology, Genitourinary, Integumentary, Muscularskeletal, Psychiatric were all negative except for pertinent positives noted in my HPI.  OBJECTIVE:     BP (!) 142/94  Pulse 84  Temp 98.7  F (37.1  C) (Oral)  Resp 16  Ht 5' 11\" (1.803 m)  Wt 246 lb (111.6 kg)  SpO2 97%  BMI 34.31 kg/m2  Body mass index is 34.31 kg/(m^2).  GENERAL: healthy, alert and no distress  HENT: ear canals and TM's normal, pharynx without erythema  NECK: no adenopathy, no asymmetry  RESP: lungs clear to auscultation - no rales, rhonchi or wheezes  CV: regular rate and rhythm, normal S1 S2, no S3 or S4, no murmur  ABDOMEN: soft, obese, nontender, no hepatosplenomegaly, no masses and bowel sounds normal  MS: no gross musculoskeletal defects noted, bruising anterior right lower leg from knee to ankle, mild, negative Falguni's sign      Diagnostic Test Results:  Results for orders placed or performed in visit on 04/11/18 (from the past 24 hour(s))   CBC with platelets   Result Value Ref Range    WBC 4.8 4.0 - 11.0 10e9/L    RBC Count 5.34 4.4 - 5.9 10e12/L    Hemoglobin 16.8 13.3 - 17.7 g/dL    Hematocrit 47.5 40.0 - 53.0 %    MCV 89 78 - 100 fl    MCH 31.5 " 26.5 - 33.0 pg    MCHC 35.4 31.5 - 36.5 g/dL    RDW 12.7 10.0 - 15.0 %    Platelet Count 168 150 - 450 10e9/L       ASSESSMENT/PLAN:             1. Type 2 diabetes mellitus without complication, without long-term current use of insulin (H)    - Comprehensive metabolic panel  - CBC with platelets  - metFORMIN (GLUCOPHAGE-XR) 500 MG 24 hr tablet; Take 1 tablet (500 mg) by mouth every evening  Dispense: 90 tablet; Refill: 1  - aspirin EC 81 MG EC tablet; Take 4 tablets (325 mg) by mouth daily  Dispense: 360 tablet; Refill: 3  A lot of concerns today and tried to address as much as possible. Long discussion regarding his need to adhere to medications and ongoing management of health conditions.  Will resume previous medications at same doses. See him back in 2 weeks to recheck blood pressure and continue to address chronic health issues.      2. Gastroesophageal reflux disease without esophagitis      3. Hypertriglyceridemia    - Lipid panel reflex to direct LDL Fasting; Future  - Comprehensive metabolic panel    4. Essential hypertension, benign    - Comprehensive metabolic panel  - TSH with free T4 reflex  - CBC with platelets  - lisinopril (PRINIVIL/ZESTRIL) 10 MG tablet; Take 1 tablet (10 mg) by mouth daily  Dispense: 90 tablet; Refill: 1    5. Right calf pain    - US Lower Extremity Venous Duplex Right; Future  Will send to R/O DVT after trauma.    6. Bruising    - CBC with platelets  - US Lower Extremity Venous Duplex Right; Future    7. Hyperlipidemia LDL goal <100    - rosuvastatin (CRESTOR) 40 MG tablet; Take 1 tablet (40 mg) by mouth daily  Dispense: 90 tablet; Refill: 3        8. Chronic rhinitis, unspecified type    - fluticasone (FLONASE) 50 MCG/ACT spray; Spray 1-2 sprays into both nostrils daily  Dispense: 1 Bottle; Refill: 1  Discussed how to take the medication(s), expected outcomes, potential side effects.    See Patient Instructions  Patient Instructions   Resume your medications daily and continue  with those.  Will be notified of pending labs.  Will make sure you don't have a clot in your right leg.  Follow up in two weeks.  Follow up if symptoms persist or worsen and as needed.        Thank you for choosing [facility].  You may be receiving a survey in the mail from [facility] [employer] regarding your visit today.  Please take a few minutes to complete and return the survey to let us know how we are doing.      Our Clinic hours are:  Mondays    7:20 am - 7 pm  Tues -  Fri  7:20 am - 5 pm    Clinic Phone: [phone]    The clinic lab opens at 7:30 am Mon - Fri and appointments are required.    [facility] [place]  Ph. [phone]  Monday-Thursday 8 am - 7pm  Tues/Wed/Fri 8 am - 5:30 pm           MICHELLE Garcia CNP  [facility]

## 2018-04-13 ENCOUNTER — OFFICE VISIT (OUTPATIENT)
Dept: FAMILY MEDICINE | Facility: CLINIC | Age: 48
End: 2018-04-13
Payer: COMMERCIAL

## 2018-04-13 VITALS
HEIGHT: 71 IN | BODY MASS INDEX: 34.86 KG/M2 | OXYGEN SATURATION: 97 % | DIASTOLIC BLOOD PRESSURE: 82 MMHG | HEART RATE: 82 BPM | TEMPERATURE: 99.1 F | SYSTOLIC BLOOD PRESSURE: 121 MMHG | WEIGHT: 249 LBS | RESPIRATION RATE: 16 BRPM

## 2018-04-13 DIAGNOSIS — R53.83 FATIGUE, UNSPECIFIED TYPE: ICD-10-CM

## 2018-04-13 DIAGNOSIS — J20.9 ACUTE BRONCHITIS WITH SYMPTOMS > 10 DAYS: Primary | ICD-10-CM

## 2018-04-13 DIAGNOSIS — I10 ESSENTIAL HYPERTENSION, BENIGN: Chronic | ICD-10-CM

## 2018-04-13 DIAGNOSIS — E78.1 HYPERTRIGLYCERIDEMIA: Chronic | ICD-10-CM

## 2018-04-13 LAB
CHOLEST SERPL-MCNC: 217 MG/DL
HDLC SERPL-MCNC: 24 MG/DL
LDLC SERPL CALC-MCNC: ABNORMAL MG/DL
LDLC SERPL DIRECT ASSAY-MCNC: 97 MG/DL
NONHDLC SERPL-MCNC: 193 MG/DL
TRIGL SERPL-MCNC: 600 MG/DL

## 2018-04-13 PROCEDURE — 83721 ASSAY OF BLOOD LIPOPROTEIN: CPT | Mod: 59 | Performed by: NURSE PRACTITIONER

## 2018-04-13 PROCEDURE — 93000 ELECTROCARDIOGRAM COMPLETE: CPT | Performed by: NURSE PRACTITIONER

## 2018-04-13 PROCEDURE — 80061 LIPID PANEL: CPT | Performed by: NURSE PRACTITIONER

## 2018-04-13 PROCEDURE — 36415 COLL VENOUS BLD VENIPUNCTURE: CPT | Performed by: NURSE PRACTITIONER

## 2018-04-13 PROCEDURE — 99214 OFFICE O/P EST MOD 30 MIN: CPT | Performed by: NURSE PRACTITIONER

## 2018-04-13 RX ORDER — AZITHROMYCIN 250 MG/1
TABLET, FILM COATED ORAL
Qty: 6 TABLET | Refills: 0 | Status: SHIPPED | OUTPATIENT
Start: 2018-04-13 | End: 2018-05-08

## 2018-04-13 NOTE — LETTER
[facility]  [address]  [facility] [address]  Phone: [phone]      4/16/2018     Phi Ortiz Jr.  [address]  [place] [address]      Dear Phi:    Thank you for allowing me to participate in your care. Your recent test results were reviewed and listed below.  Elevated cholesterol numbers.   Will recheck it after you are consistently taking the Crestor daily.   Continue with healthy diet, exercise.   The 10-year ASCVD risk score ([place] LAURA Jr, et al., 2013) is: 12.3%     Values used to calculate the score:       Age: 47 years       Sex: Male       Is Non- : No       Diabetic: Yes       Tobacco smoker: No       Systolic Blood Pressure: 121 mmHg       Is BP treated: Yes       HDL Cholesterol: 24 mg/dL       Total Cholesterol: 217 mg/dL     Your results are provided below for your review  Results for orders placed or performed in visit on 04/13/18   Lipid panel reflex to direct LDL Fasting   Result Value Ref Range    Cholesterol 217 (H) <200 mg/dL    Triglycerides 600 (H) <150 mg/dL    HDL Cholesterol 24 (L) >39 mg/dL    LDL Cholesterol Calculated  <100 mg/dL     Cannot estimate LDL when triglyceride exceeds 400 mg/dL    Non HDL Cholesterol 193 (H) <130 mg/dL   LDL cholesterol direct   Result Value Ref Range    LDL Cholesterol Direct 97 <100 mg/dL                 Thank you for choosing [facility]. As a result, please continue with the treatment plan discussed in the office. Return as discussed or sooner if symptoms worsen or fail to improve. If you have any further questions or concerns, please do not hesitate to contact us.      Sincerely,        PRAMOD Gallardo

## 2018-04-13 NOTE — PATIENT INSTRUCTIONS
Bring in all medications at next visit.  Follow up if symptoms persist or worsen and as needed.                         Acute Bronchitis                  What is acute bronchitis?   Bronchitis is an infection of the air passages--that is, the tubes that connect the windpipe to the lungs. It causes swelling and irritation of the airways. With acute bronchitis you usually have a cough that produces phlegm and pain behind the breastbone when you breathe deeply or cough.   How does it occur?   Bronchitis often occurs with viral infections of the respiratory tract, such as colds and flu. Bronchitis may also be caused by bacterial infections. It may occur with childhood illnesses such as measles and whooping cough.   Attacks are most frequent during the winter or when the level of air pollution is high.   Infants, young children, older adults, smokers, and people with heart disease or lung disease (including asthma and allergies) are most likely to get acute bronchitis.   What are the symptoms?   Symptoms may include:   a deep cough that produces yellowish or greenish phlegm   pain behind the breastbone when you breathe deeply or cough   wheezing   feeling short of breath   fever   chills   headache   sore muscles.   How is it diagnosed?   Your healthcare provider will ask about your symptoms and examine you. You may have tests, such as:   a test of phlegm to look for bacteria   chest X-ray   blood tests.   How is it treated?   Acute bronchitis often does not require medical treatment. Resting at home and drinking plenty of fluids to keep the mucus loose may be all you need to do to get better in a few days. If your symptoms are severe or you have other health problems (such as heart or lung disease or diabetes), you may need to take antibiotics.   How long will the effects last?   Most of the time acute bronchitis clears up in a few days. Your cough may slowly get better in 1 to 2 weeks.   It may take you longer to  recover if:   You are a smoker.   You live in an area where air pollution is a problem.   You have a heart or lung disease.   You have any other continuing health problems.   How can I take care of myself?   You can help yourself by:   following the full treatment your healthcare provider recommends   using a vaporizer, humidifier, or steam from hot water to add moisture to the air   drinking plenty of liquids   taking cough medicine if recommended by your healthcare provider   resting in bed   taking aspirin or acetaminophen to reduce fever and relieve headache and muscle pain (Check with your healthcare provider before you give any medicine that contains aspirin or salicylates to a child or teen. This includes medicines like baby aspirin, some cold medicines, and Pepto Bismol. Children and teens who take aspirin are at risk for a serious illness called Reye's syndrome.)   eating healthy meals.   Call your healthcare provider if:   You have trouble breathing.   You have a fever of 101.5?F (38.6?C) or higher.   You cough up blood.   Your symptoms are getting worse instead of better.   You don't start to feel better after 3 days of treatment.   You have any symptoms that concern you.   How can I help prevent acute bronchitis?   To reduce your risk of getting a respiratory infection:   Do not smoke.   Wash your hands often, especially when you are around people with colds (upper respiratory infections).   If you have asthma or allergies, keep your symptoms under good control.   Get regular exercise.   Eat healthy foods.       Published by [employer].  This content is reviewed periodically and is subject to change as new health information becomes available. The information is intended to inform and educate and is not a replacement for medical evaluation, advice, diagnosis or treatment by a healthcare professional.   Developed by [employer].   ? 2010 [employer] and/or its affiliates. All Rights Reserved.   Copyright    [employer] 2011              Thank you for choosing [facility].  You may be receiving a survey in the mail from [employer] regarding your visit today.  Please take a few minutes to complete and return the survey to let us know how we are doing.      Our Clinic hours are:  Mondays    7:20 am - 7 pm  Tues -  Fri  7:20 am - 5 pm    Clinic Phone: [phone]    The clinic lab opens at 7:30 am Mon - Fri and appointments are required.    [facility]. [phone]  Monday-Thursday 8 am - 7pm  Tues/Wed/Fri 8 am - 5:30 pm

## 2018-04-13 NOTE — LETTER
[facility]  [address] [facility] [address]  Phone: [phone]    April 13, 2018        Phi Ortiz Jr.  [address]  [place] [address]          To whom it may concern:    RE: Phi Ortiz Jr.    Patient was seen and treated today at our clinic and missed work 4/12/18 and 4/13/18.    Please contact me for questions or concerns.      Sincerely,        MICHELLE Garcia CNP

## 2018-04-13 NOTE — NURSING NOTE
"Chief Complaint   Patient presents with     Fatigue       Initial /82 (BP Location: Left arm, Patient Position: Chair, Cuff Size: Adult Large)  Pulse 82  Temp 99.1  F (37.3  C) (Oral)  Resp 16  Ht 5' 11\" (1.803 m)  Wt 249 lb (112.9 kg)  SpO2 97%  BMI 34.73 kg/m2 Estimated body mass index is 34.73 kg/(m^2) as calculated from the following:    Height as of this encounter: 5' 11\" (1.803 m).    Weight as of this encounter: 249 lb (112.9 kg).  Medication Reconciliation: complete  "

## 2018-04-13 NOTE — PROGRESS NOTES
"  SUBJECTIVE:   Phi Ortiz Jr. is a 47 year old male who presents to clinic today for the following health issues:      Pt is having increased fatigue and productive cough.        Problem list and histories reviewed & adjusted, as indicated.  Additional history: returns today with continuing concerns about fatigue and URI complaints. He was just seen and restarted on his hypertension, diabetes medications that he had stopped.  His blood pressure is much better. He states he's taking his \"old pills first\" He doesn't know for sure the doses. He will bring those in when he returns for scheduled follow up after his multiple concerns.  Was felt to be viral URI when I saw him and he hasn't improved. He has a history of CAD, denies chest pain. He was supposed to follow up with cardiology every six months, but lapsed on that as well.  Cough and sore throat are worst present symptoms.  Recent labs with normal CBC, CMP, other than elevated glucose and normal thyroid.    Patient Active Problem List   Diagnosis     GERD (gastroesophageal reflux disease)     Generalized anxiety disorder     Fatty liver     Family history of coronary artery disease     Hypertriglyceridemia     Vitamin D deficiency     Chest pain     Chronic ischemic heart disease     Hyperlipidemia LDL goal <70     Type 2 diabetes mellitus without complications (H)     Mild major depression (H)     overweight BMI greater than 35     Essential hypertension, benign     Past Surgical History:   Procedure Laterality Date     NO HISTORY OF SURGERY         Social History   Substance Use Topics     Smoking status: Never Smoker     Smokeless tobacco: Former User     Alcohol use Yes      Comment: 24 pack a month, intermittent drinks based off the shift (1st, 2nd or 3rd) he's working that week.     Family History   Problem Relation Age of Onset     C.A.D. Father      heart attack 30's     CEREBROVASCULAR DISEASE Father      DIABETES Father      Hypertension Father  "     Hypertension Mother      GASTROINTESTINAL DISEASE Mother      colitis     DIABETES Maternal Grandmother      snores     Dementia Maternal Grandmother      C.A.D. Maternal Grandfather      MI, leg circulation problems     C.A.D. Paternal Grandfather      MI in 30s, snores     Neurologic Disorder Sister      vertigo.  fibromyalgia     Hyperlipidemia Daughter      Breast Cancer No family hx of      Cancer - colorectal No family hx of      Prostate Cancer No family hx of          Current Outpatient Prescriptions   Medication Sig Dispense Refill     azithromycin (ZITHROMAX) 250 MG tablet Two tablets first day, then one tablet daily for four days. 6 tablet 0     calcium carbonate (TUMS) 500 MG chewable tablet Take 1 chew tab by mouth daily as needed for heartburn       rosuvastatin (CRESTOR) 40 MG tablet Take 1 tablet (40 mg) by mouth daily 90 tablet 3     metFORMIN (GLUCOPHAGE-XR) 500 MG 24 hr tablet Take 1 tablet (500 mg) by mouth every evening 90 tablet 1     lisinopril (PRINIVIL/ZESTRIL) 10 MG tablet Take 1 tablet (10 mg) by mouth daily 90 tablet 1     aspirin EC 81 MG EC tablet Take 4 tablets (325 mg) by mouth daily 360 tablet 3     metFORMIN (GLUCOPHAGE-XR) 500 MG 24 hr tablet Take 1 tablet (500 mg) by mouth every evening 90 tablet 1     fluticasone (FLONASE) 50 MCG/ACT spray Spray 1-2 sprays into both nostrils daily 1 Bottle 1     pantoprazole (PROTONIX) 40 MG EC tablet TAKE 1 TABLET BY MOUTH DAILY, 30 TO 60 MINUTES BEFORE A MEAL. 90 tablet 2     ORDER FOR DME, SET TO FAX, Phi was set up on [employer]  Type [employer] Auto (System One 60 series)  Serial Number: [id number] Modem Number: [id number] Pressure: AUTO-TITRATE CPAP 7-12 cm H20 Mask of choice: AIRFIT F 10 FULL FACE MASK Size: MEDIUM HEATED TUBING AND MODEM PROVIDED       [DISCONTINUED] rosuvastatin (CRESTOR) 40 MG tablet Take 1 tablet (40 mg) by mouth daily 90 tablet 3     [DISCONTINUED] lisinopril (PRINIVIL/ZESTRIL) 2.5 MG tablet Take 1  tablet (2.5 mg) by mouth daily 90 tablet 1     sildenafil (REVATIO/VIAGRA) 20 MG tablet Take 1 - 3 tablets as needed.  Never use with nitroglycerin, terazosin or doxazosin. (Patient not taking: Reported on 12/11/2017) 36 tablet 11     sildenafil (REVATIO/VIAGRA) 100 MG cap/tab Take 0.5-1 tablets ( mg) by mouth daily as needed for erectile dysfunction Take 30 min to 4 hours before intercourse.  Never use with nitroglycerin, terazosin or doxazosin. (Patient not taking: Reported on 12/11/2017) 6 tablet 11     FLUoxetine (PROZAC) 10 MG capsule One capsule daily for 1 week, then increase to 2 capsules daily (Patient not taking: Reported on 12/11/2017) 60 capsule 1     hydrOXYzine (ATARAX) 25 MG tablet Take 1-2 tablets (25-50 mg) by mouth every 8 hours as needed for anxiety (Patient not taking: Reported on 4/11/2018) 60 tablet 1     ALPRAZolam (XANAX) 0.25 MG tablet Take 1 tablet (0.25 mg) by mouth 3 times daily as needed for anxiety (Patient not taking: Reported on 12/11/2017) 15 tablet 0     order for DME Automated blood pressure cuff. (Patient not taking: Reported on 12/11/2017) 1 each 0     blood glucose monitoring (ONE TOUCH ULTRA 2) meter device kit Use to test blood sugars 1 times daily or as directed. (Patient not taking: Reported on 12/11/2017) 1 kit 0     blood glucose (ONE TOUCH DELICA) lancing device Use to test blood sugars 1 times daily or as directed. (Patient not taking: Reported on 12/11/2017) 30 each 6     blood glucose monitoring (ONE TOUCH ULTRA) test strip Use to test blood sugars 1 times daily or as directed. (Patient not taking: Reported on 12/11/2017) 1 Box 6     No Known Allergies  Recent Labs   Lab Test  04/11/18   1145  12/11/17   0935  07/20/17   1004  06/12/17   0434  06/02/17   1159   10/06/16   1115   03/03/16   1419   11/23/15   0859   A1C   --    --    --    --   7.0*   --   6.1*   --   7.9*   --   6.8*   LDL   --    --   138*   --    --    --    --    --   85   --   109*   HDL   --   "  --   37*   --    --    --    --    --   32*   --   32*   TRIG   --    --   225*   --    --    --    --    --   247*   --   201*   ALT  50  35   --   55   --    --    --    < >   --    --   52   CR  0.69  0.63*   --   0.75   --    < >  0.62*   < >  0.61*   < >  0.68   GFRESTIMATED  >90  >90   --   >90  Non  GFR Calc     --    < >  >90  Non  GFR Calc     < >  >90  Non  GFR Calc     < >  >90  Non  GFR Calc     GFRESTBLACK  >90  >90   --   >90   GFR Calc     --    < >  >90   GFR Calc     < >  >90   GFR Calc     < >  >90   GFR Calc     POTASSIUM  4.2  4.3   --   4.0   --    < >  3.9   < >  4.0   < >  4.0   TSH  1.61   --    --    --    --    --   1.17   --    --    --   1.28    < > = values in this interval not displayed.      BP Readings from Last 3 Encounters:   04/13/18 121/82   04/11/18 (!) 142/94   12/11/17 133/87    Wt Readings from Last 3 Encounters:   04/13/18 249 lb (112.9 kg)   04/11/18 246 lb (111.6 kg)   12/11/17 246 lb (111.6 kg)                    Reviewed and updated as needed this visit by clinical staff  Tobacco  Allergies  Meds  Med Hx  Surg Hx  Fam Hx  Soc Hx      Reviewed and updated as needed this visit by Provider          ROS: 10 point ROS neg other than the symptoms noted above in the HPI.    OBJECTIVE:     /82 (BP Location: Left arm, Patient Position: Chair, Cuff Size: Adult Large)  Pulse 82  Temp 99.1  F (37.3  C) (Oral)  Resp 16  Ht 5' 11\" (1.803 m)  Wt 249 lb (112.9 kg)  SpO2 97%  BMI 34.73 kg/m2  Body mass index is 34.73 kg/(m^2).  GENERAL: healthy, alert and no distress  HENT: ear canals and TM's normal, pharynx with moderate erythema  NECK: no adenopathy, no asymmetry  RESP: lungs clear to auscultation - no rales, rhonchi or wheezes, prolonged expiratory phase, occasional harsh cough  CV: regular rate and rhythm, normal S1 S2, no S3 or S4, no " murmur  MS: no gross musculoskeletal defects noted      Diagnostic Test Results:  No results found for this or any previous visit (from the past 24 hour(s)).    ASSESSMENT/PLAN:             1. Acute bronchitis with symptoms > 10 days    - azithromycin (ZITHROMAX) 250 MG tablet; Two tablets first day, then one tablet daily for four days.  Dispense: 6 tablet; Refill: 0  Discussed how to take the medication(s), expected outcomes, potential side effects.    2. Fatigue, unspecified type    - EKG 12-lead complete w/read - Clinics  EKG was read as normal, no evidence of any acute or concerning changes. He will schedule for his usual every six months cardiology visits.  Again reviewed previous labs that were essentially normal, other than elevated glucose. Treat URI and will hope his fatigue dissipates.    3. Hypertriglyceridemia    - Lipid panel reflex to direct LDL Fasting    4. Essential hypertension, benign    Much better. Continue with all medications.  Will further review his chronic issues at next visit.      See Patient Instructions    Patient Instructions   Bring in all medications at next visit.  Follow up if symptoms persist or worsen and as needed.                         Acute Bronchitis                  What is acute bronchitis?   Bronchitis is an infection of the air passages--that is, the tubes that connect the windpipe to the lungs. It causes swelling and irritation of the airways. With acute bronchitis you usually have a cough that produces phlegm and pain behind the breastbone when you breathe deeply or cough.   How does it occur?   Bronchitis often occurs with viral infections of the respiratory tract, such as colds and flu. Bronchitis may also be caused by bacterial infections. It may occur with childhood illnesses such as measles and whooping cough.   Attacks are most frequent during the winter or when the level of air pollution is high.   Infants, young children, older adults, smokers, and people with  heart disease or lung disease (including asthma and allergies) are most likely to get acute bronchitis.   What are the symptoms?   Symptoms may include:   a deep cough that produces yellowish or greenish phlegm   pain behind the breastbone when you breathe deeply or cough   wheezing   feeling short of breath   fever   chills   headache   sore muscles.   How is it diagnosed?   Your healthcare provider will ask about your symptoms and examine you. You may have tests, such as:   a test of phlegm to look for bacteria   chest X-ray   blood tests.   How is it treated?   Acute bronchitis often does not require medical treatment. Resting at home and drinking plenty of fluids to keep the mucus loose may be all you need to do to get better in a few days. If your symptoms are severe or you have other health problems (such as heart or lung disease or diabetes), you may need to take antibiotics.   How long will the effects last?   Most of the time acute bronchitis clears up in a few days. Your cough may slowly get better in 1 to 2 weeks.   It may take you longer to recover if:   You are a smoker.   You live in an area where air pollution is a problem.   You have a heart or lung disease.   You have any other continuing health problems.   How can I take care of myself?   You can help yourself by:   following the full treatment your healthcare provider recommends   using a vaporizer, humidifier, or steam from hot water to add moisture to the air   drinking plenty of liquids   taking cough medicine if recommended by your healthcare provider   resting in bed   taking aspirin or acetaminophen to reduce fever and relieve headache and muscle pain (Check with your healthcare provider before you give any medicine that contains aspirin or salicylates to a child or teen. This includes medicines like baby aspirin, some cold medicines, and Pepto Bismol. Children and teens who take aspirin are at risk for a serious illness called Reye's  syndrome.)   eating healthy meals.   Call your healthcare provider if:   You have trouble breathing.   You have a fever of 101.5?F (38.6?C) or higher.   You cough up blood.   Your symptoms are getting worse instead of better.   You don't start to feel better after 3 days of treatment.   You have any symptoms that concern you.   How can I help prevent acute bronchitis?   To reduce your risk of getting a respiratory infection:   Do not smoke.   Wash your hands often, especially when you are around people with colds (upper respiratory infections).   If you have asthma or allergies, keep your symptoms under good control.   Get regular exercise.   Eat healthy foods.       Published by [employer].  This content is reviewed periodically and is subject to change as new health information becomes available. The information is intended to inform and educate and is not a replacement for medical evaluation, advice, diagnosis or treatment by a healthcare professional.   Developed by [employer].   ? 2010 [employer] [facility] and/or its affiliates. All Rights Reserved.   Copyright   [employer] 2011              Thank you for choosing [facility].  You may be receiving a survey in the mail from [employer] regarding your visit today.  Please take a few minutes to complete and return the survey to let us know how we are doing.      Our Clinic hours are:  Mondays    7:20 am - 7 pm  Tues -  Fri  7:20 am - 5 pm    Clinic Phone: [phone]    The clinic lab opens at 7:30 am Mon - Fri and appointments are required.    [facility] [place]  Ph. [phone]  Monday-Thursday 8 am - 7pm  Tues/Wed/Fri 8 am - 5:30 pm             MICHELLE Garcia CNP  [facility]

## 2018-04-13 NOTE — LETTER
[facility]  [address] [facility] [address]  Phone: [phone]    April 13, 2018        Phi Ortiz Jr.  [address]  [place] [address]          To whom it may concern:    RE: Phi Ortiz Jr.    Patient was seen and treated today at our clinic and missed work today.    Please contact me for questions or concerns.      Sincerely,        MICHELLE Garcia CNP

## 2018-04-13 NOTE — MR AVS SNAPSHOT
After Visit Summary   4/13/2018    Phi Ortiz Jr.    MRN: [id number]           Patient Information     Date Of Birth          1970        Visit Information        Provider Department      4/13/2018 10:00 AM Mckenna Caldwell APRN [facility]        Today's Diagnoses     Acute bronchitis with symptoms > 10 days    -  1    Fatigue, unspecified type        Hypertriglyceridemia          Care Instructions    Bring in all medications at next visit.  Follow up if symptoms persist or worsen and as needed.                         Acute Bronchitis                  What is acute bronchitis?   Bronchitis is an infection of the air passages--that is, the tubes that connect the windpipe to the lungs. It causes swelling and irritation of the airways. With acute bronchitis you usually have a cough that produces phlegm and pain behind the breastbone when you breathe deeply or cough.   How does it occur?   Bronchitis often occurs with viral infections of the respiratory tract, such as colds and flu. Bronchitis may also be caused by bacterial infections. It may occur with childhood illnesses such as measles and whooping cough.   Attacks are most frequent during the winter or when the level of air pollution is high.   Infants, young children, older adults, smokers, and people with heart disease or lung disease (including asthma and allergies) are most likely to get acute bronchitis.   What are the symptoms?   Symptoms may include:   a deep cough that produces yellowish or greenish phlegm   pain behind the breastbone when you breathe deeply or cough   wheezing   feeling short of breath   fever   chills   headache   sore muscles.   How is it diagnosed?   Your healthcare provider will ask about your symptoms and examine you. You may have tests, such as:   a test of phlegm to look for bacteria   chest X-ray   blood tests.   How is it treated?   Acute bronchitis often does not require medical  treatment. Resting at home and drinking plenty of fluids to keep the mucus loose may be all you need to do to get better in a few days. If your symptoms are severe or you have other health problems (such as heart or lung disease or diabetes), you may need to take antibiotics.   How long will the effects last?   Most of the time acute bronchitis clears up in a few days. Your cough may slowly get better in 1 to 2 weeks.   It may take you longer to recover if:   You are a smoker.   You live in an area where air pollution is a problem.   You have a heart or lung disease.   You have any other continuing health problems.   How can I take care of myself?   You can help yourself by:   following the full treatment your healthcare provider recommends   using a vaporizer, humidifier, or steam from hot water to add moisture to the air   drinking plenty of liquids   taking cough medicine if recommended by your healthcare provider   resting in bed   taking aspirin or acetaminophen to reduce fever and relieve headache and muscle pain (Check with your healthcare provider before you give any medicine that contains aspirin or salicylates to a child or teen. This includes medicines like baby aspirin, some cold medicines, and Pepto Bismol. Children and teens who take aspirin are at risk for a serious illness called Reye's syndrome.)   eating healthy meals.   Call your healthcare provider if:   You have trouble breathing.   You have a fever of 101.5?F (38.6?C) or higher.   You cough up blood.   Your symptoms are getting worse instead of better.   You don't start to feel better after 3 days of treatment.   You have any symptoms that concern you.   How can I help prevent acute bronchitis?   To reduce your risk of getting a respiratory infection:   Do not smoke.   Wash your hands often, especially when you are around people with colds (upper respiratory infections).   If you have asthma or allergies, keep your symptoms under good control.    Get regular exercise.   Eat healthy foods.       Published by [employer].  This content is reviewed periodically and is subject to change as new health information becomes available. The information is intended to inform and educate and is not a replacement for medical evaluation, advice, diagnosis or treatment by a healthcare professional.   Developed by [employer].   ? 2010 [facility] and/or its affiliates. All Rights Reserved.   Copyright   [employer] 2011              Thank you for choosing [facility].  You may be receiving a survey in the mail from [employer] regarding your visit today.  Please take a few minutes to complete and return the survey to let us know how we are doing.      Our Clinic hours are:  Mondays    7:20 am - 7 pm  Tues -  Fri  7:20 am - 5 pm    Clinic Phone: [phone]    The clinic lab opens at 7:30 am Mon - Fri and appointments are required.    [facility]  Ph. [phone]  Monday-Thursday 8 am - 7pm  Tues/Wed/Fri 8 am - 5:30 pm                 Follow-ups after your visit        Follow-up notes from your care team     Return if symptoms worsen or fail to improve.      Your next 10 appointments already scheduled     Apr 18, 2018 12:00 PM CDT   [facility] MICHELLE Garcia CNP   [facility]    [address] [address] [phone]           Bring a current list of meds and any records pertaining to this visit. For Physicals, please bring immunization records and any forms needing to be filled out. Please arrive 10 minutes early to complete paperwork.              Who to contact     If you have questions or need follow up information about today's clinic visit or your schedule please contact [facility] directly at [phone].  Normal or non-critical lab and imaging results will be communicated to you by MyChart, letter or phone within 4  "business days after the clinic has received the results. If you do not hear from us within 7 days, please contact the clinic through [employer] or phone. If you have a critical or abnormal lab result, we will notify you by phone as soon as possible.  Submit refill requests through [employer] or call your pharmacy and they will forward the refill request to us. Please allow 3 business days for your refill to be completed.          Additional Information About Your Visit        [employer] Information     [employer] lets you send messages to your doctor, view your test results, renew your prescriptions, schedule appointments and more. To sign up, go to [email or web address] . Click on \"Log in\" on the left side of the screen, which will take you to the Welcome page. Then click on \"Sign up Now\" on the right side of the page.     You will be asked to enter the access code listed below, as well as some personal information. Please follow the directions to create your username and password.     Your access code is: [email or web address]  Expires: 7/10/2018 11:42 AM     Your access code will  in 90 days. If you need help or a new code, please call your [place] clinic or [phone].        Care EveryWhere ID     This is your Care EveryWhere ID. This could be used by other organizations to access your [place] medical records  [id number]        Your Vitals Were     Pulse Temperature Respirations Height Pulse Oximetry BMI (Body Mass Index)    82 99.1  F (37.3  C) (Oral) 16 5' 11\" (1.803 m) 97% 34.73 kg/m2       Blood Pressure from Last 3 Encounters:   18 121/82   18 (!) 142/94   17 133/87    Weight from Last 3 Encounters:   18 249 lb (112.9 kg)   18 246 lb (111.6 kg)   17 246 lb (111.6 kg)              We Performed the Following     EKG 12-lead complete w/read - Clinics     Lipid panel reflex to direct LDL Fasting          Today's Medication Changes          These changes are accurate as of 18 " 10:26 AM.  If you have any questions, ask your nurse or doctor.               Start taking these medicines.        Dose/Directions    azithromycin 250 MG tablet   Commonly known as:  ZITHROMAX   Used for:  Acute bronchitis with symptoms > 10 days   Started by:  Mckenna Caldwell APRN CNP        Two tablets first day, then one tablet daily for four days.   Quantity:  6 tablet   Refills:  0         These medicines have changed or have updated prescriptions.        Dose/Directions    lisinopril 10 MG tablet   Commonly known as:  PRINIVIL/ZESTRIL   This may have changed:  Another medication with the same name was removed. Continue taking this medication, and follow the directions you see here.   Used for:  Essential hypertension, benign   Changed by:  Mckenna Caldwell APRN CNP        Dose:  10 mg   Take 1 tablet (10 mg) by mouth daily   Quantity:  90 tablet   Refills:  1            Where to get your medicines      These medications were sent to [facility] [address]  [address] [facility] [address]     Phone:  [phone]     azithromycin 250 MG tablet                Primary Care Provider Office Phone # Fax #    Genet Ortiz, [phone]       [address] [facility] [address]        Equal Access to Services     JEVON CAMPOS AH: Hadii karen elizabeth hadasho Soomaali, waaxda luqadaha, qaybta kaalmada adeegyada, waxay mellisa carranza. So [facility] [phone].    ATENCIÓN: Si habla español, tiene a king disposición servicios gratuitos de asistencia lingüística. Llame al [phone].    We comply with applicable federal civil rights laws and [place] laws. We do not discriminate on the basis of race, color, national origin, age, disability, sex, sexual orientation, or gender identity.            Thank you!     Thank you for choosing [facility]  for your care. Our goal is always to provide you  with excellent care. Hearing back from our patients is one way we can continue to improve our services. Please take a few minutes to complete the written survey that you may receive in the mail after your visit with us. Thank you!             Your Updated Medication List - Protect others around you: Learn how to safely use, store and throw away your medicines at www.disposemymeds.org.          This list is accurate as of 4/13/18 10:26 AM.  Always use your most recent med list.                   Brand Name Dispense Instructions for use Diagnosis    ALPRAZolam 0.25 MG tablet    XANAX    15 tablet    Take 1 tablet (0.25 mg) by mouth 3 times daily as needed for anxiety    Anxiety       aspirin EC 81 MG EC tablet     360 tablet    Take 4 tablets (325 mg) by mouth daily    Type 2 diabetes mellitus without complication, without long-term current use of insulin (H)       azithromycin 250 MG tablet    ZITHROMAX    6 tablet    Two tablets first day, then one tablet daily for four days.    Acute bronchitis with symptoms > 10 days       blood glucose lancing device     30 each    Use to test blood sugars 1 times daily or as directed.    Type 2 diabetes mellitus without complications (H)       blood glucose monitoring meter device kit     1 kit    Use to test blood sugars 1 times daily or as directed.    Type 2 diabetes mellitus without complications (H)       blood glucose monitoring test strip    ONETOUCH ULTRA    1 Box    Use to test blood sugars 1 times daily or as directed.    Type 2 diabetes mellitus without complications (H)       calcium carbonate 500 MG chewable tablet    TUMS     Take 1 chew tab by mouth daily as needed for heartburn        FLUoxetine 10 MG capsule    PROzac    60 capsule    One capsule daily for 1 week, then increase to 2 capsules daily    Generalized anxiety disorder, Mild major depression (H)       fluticasone 50 MCG/ACT spray    FLONASE    1 Bottle    Spray 1-2 sprays into both nostrils daily     Chronic rhinitis, unspecified type       hydrOXYzine 25 MG tablet    ATARAX    60 tablet    Take 1-2 tablets (25-50 mg) by mouth every 8 hours as needed for anxiety    Generalized anxiety disorder       lisinopril 10 MG tablet    PRINIVIL/ZESTRIL    90 tablet    Take 1 tablet (10 mg) by mouth daily    Essential hypertension, benign       * metFORMIN 500 MG 24 hr tablet    GLUCOPHAGE-XR    90 tablet    Take 1 tablet (500 mg) by mouth every evening    Type 2 diabetes mellitus without complication, without long-term current use of insulin (H)       * metFORMIN 500 MG 24 hr tablet    GLUCOPHAGE-XR    90 tablet    Take 1 tablet (500 mg) by mouth every evening    Type 2 diabetes mellitus without complication, without long-term current use of insulin (H)       order for DME      Phi was set up on [employer]  Type [employer] Auto (System One 60 series)  Serial Number: [id number] Modem Number: [id number] Pressure: AUTO-TITRATE CPAP 7-12 cm H20 Mask of choice: AIRFIT F 10 FULL FACE MASK Size: MEDIUM HEATED TUBING AND MODEM PROVIDED        order for DME     1 each    Automated blood pressure cuff.    Essential hypertension, benign       pantoprazole 40 MG EC tablet    PROTONIX    90 tablet    TAKE 1 TABLET BY MOUTH DAILY, 30 TO 60 MINUTES BEFORE A MEAL.    Gastroesophageal reflux disease without esophagitis       rosuvastatin 40 MG tablet    CRESTOR    90 tablet    Take 1 tablet (40 mg) by mouth daily    Hyperlipidemia LDL goal <100       sildenafil 100 MG tablet    VIAGRA    6 tablet    Take 0.5-1 tablets ( mg) by mouth daily as needed for erectile dysfunction Take 30 min to 4 hours before intercourse.  Never use with nitroglycerin, terazosin or doxazosin.    Erectile dysfunction due to diseases classified elsewhere       sildenafil 20 MG tablet    REVATIO    36 tablet    Take 1 - 3 tablets as needed.  Never use with nitroglycerin, terazosin or doxazosin.    Erectile dysfunction due to diseases classified  elsewhere       * Notice:  This list has 2 medication(s) that are the same as other medications prescribed for you. Read the directions carefully, and ask your doctor or other care provider to review them with you.

## 2018-05-08 ENCOUNTER — OFFICE VISIT (OUTPATIENT)
Dept: FAMILY MEDICINE | Facility: CLINIC | Age: 48
End: 2018-05-08
Payer: COMMERCIAL

## 2018-05-08 ENCOUNTER — RADIANT APPOINTMENT (OUTPATIENT)
Dept: GENERAL RADIOLOGY | Facility: CLINIC | Age: 48
End: 2018-05-08
Attending: NURSE PRACTITIONER
Payer: COMMERCIAL

## 2018-05-08 VITALS
BODY MASS INDEX: 35.84 KG/M2 | TEMPERATURE: 97.9 F | HEIGHT: 71 IN | WEIGHT: 256 LBS | OXYGEN SATURATION: 95 % | RESPIRATION RATE: 16 BRPM | SYSTOLIC BLOOD PRESSURE: 125 MMHG | HEART RATE: 74 BPM | DIASTOLIC BLOOD PRESSURE: 80 MMHG

## 2018-05-08 DIAGNOSIS — R53.83 FATIGUE, UNSPECIFIED TYPE: ICD-10-CM

## 2018-05-08 DIAGNOSIS — E11.9 TYPE 2 DIABETES MELLITUS WITHOUT COMPLICATION, WITHOUT LONG-TERM CURRENT USE OF INSULIN (H): Chronic | ICD-10-CM

## 2018-05-08 DIAGNOSIS — R05.3 CHRONIC COUGH: Primary | ICD-10-CM

## 2018-05-08 DIAGNOSIS — K21.9 GASTROESOPHAGEAL REFLUX DISEASE WITHOUT ESOPHAGITIS: Chronic | ICD-10-CM

## 2018-05-08 DIAGNOSIS — R05.3 CHRONIC COUGH: ICD-10-CM

## 2018-05-08 DIAGNOSIS — F32.0 MILD MAJOR DEPRESSION (H): Chronic | ICD-10-CM

## 2018-05-08 LAB
CREAT UR-MCNC: 145 MG/DL
HBA1C MFR BLD: 7.1 % (ref 0–5.6)
MICROALBUMIN UR-MCNC: 35 MG/L
MICROALBUMIN/CREAT UR: 24.28 MG/G CR (ref 0–17)

## 2018-05-08 PROCEDURE — 99214 OFFICE O/P EST MOD 30 MIN: CPT | Performed by: NURSE PRACTITIONER

## 2018-05-08 PROCEDURE — 36415 COLL VENOUS BLD VENIPUNCTURE: CPT | Performed by: NURSE PRACTITIONER

## 2018-05-08 PROCEDURE — 83036 HEMOGLOBIN GLYCOSYLATED A1C: CPT | Performed by: NURSE PRACTITIONER

## 2018-05-08 PROCEDURE — 82043 UR ALBUMIN QUANTITATIVE: CPT | Performed by: NURSE PRACTITIONER

## 2018-05-08 PROCEDURE — 71046 X-RAY EXAM CHEST 2 VIEWS: CPT | Mod: FY

## 2018-05-08 RX ORDER — ALBUTEROL SULFATE 90 UG/1
2 AEROSOL, METERED RESPIRATORY (INHALATION) EVERY 6 HOURS PRN
Qty: 1 INHALER | Refills: 0 | Status: SHIPPED | OUTPATIENT
Start: 2018-05-08 | End: 2018-11-12

## 2018-05-08 NOTE — MR AVS SNAPSHOT
After Visit Summary   5/8/2018    Phi Ortiz Jr.    MRN: [id number]           Patient Information     Date Of Birth          1970        Visit Information        Provider Department      5/8/2018 9:40 AM Mckenna Caldwell APRN [facility]        Today's Diagnoses     Chronic cough    -  1    Type 2 diabetes mellitus without complication, without long-term current use of insulin (H)        Fatigue, unspecified type          Care Instructions    Will notify of pending chest x ray results.  Try the inhaler for cough.  See asthma/allergy specialist for further evaluation.  Follow up if symptoms persist or worsen and as needed.        Thank you for choosing [facility].  You may be receiving a survey in the mail from [employer] regarding your visit today.  Please take a few minutes to complete and return the survey to let us know how we are doing.      Our Clinic hours are:  Mondays    7:20 am - 7 pm  Tues -  Fri  7:20 am - 5 pm    Clinic Phone: [phone]    The clinic lab opens at 7:30 am Mon - Fri and appointments are required.    [facility]. [phone]  Monday-Thursday 8 am - 7pm  Tues/Wed/Fri 8 am - 5:30 pm                 Follow-ups after your visit        Additional Services     ALLERGY/ASTHMA ADULT REFERRAL       Your provider has referred you to: HORACE: [facility] [phone] [email or web address]    Please be aware that coverage of these services is subject to the terms and limitations of your health insurance plan.  Call member services at your health plan with any benefit or coverage questions.      Please bring the following with you to your appointment:    (1) Any X-Rays, CTs or MRIs which have been performed.  Contact the facility where they were done to arrange for  prior to your scheduled appointment.    (2) List of current medications  (3) This referral request   (4) Any  "documents/labs given to you for this referral                  Future tests that were ordered for you today     Open Future Orders        Priority Expected Expires Ordered    XR Chest 2 Views Routine 2018    Albumin Random Urine Quantitative with Creat Ratio Routine 2019            Who to contact     If you have questions or need follow up information about today's clinic visit or your schedule please contact [facility] directly at [phone].  Normal or non-critical lab and imaging results will be communicated to you by [employer]hart, letter or phone within 4 business days after the clinic has received the results. If you do not hear from us within 7 days, please contact the clinic through [employer] or phone. If you have a critical or abnormal lab result, we will notify you by phone as soon as possible.  Submit refill requests through [employer] or call your pharmacy and they will forward the refill request to us. Please allow 3 business days for your refill to be completed.          Additional Information About Your Visit        [email or web address] Information     [employer] lets you send messages to your doctor, view your test results, renew your prescriptions, schedule appointments and more. To sign up, go to [email or web address] . Click on \"Log in\" on the left side of the screen, which will take you to the Welcome page. Then click on \"Sign up Now\" on the right side of the page.     You will be asked to enter the access code listed below, as well as some personal information. Please follow the directions to create your username and password.     Your access code is: [email or web address]  Expires: 7/10/2018 11:42 AM     Your access code will  in 90 days. If you need help or a new code, please call your [facility] or [phone].        Care EveryWhere ID     This is your Care EveryWhere ID. This could be used by other organizations to access your [place] medical " "records  [id number]        Your Vitals Were     Pulse Temperature Respirations Height Pulse Oximetry BMI (Body Mass Index)    74 97.9  F (36.6  C) (Oral) 16 5' 11\" (1.803 m) 95% 35.7 kg/m2       Blood Pressure from Last 3 Encounters:   05/08/18 125/80   04/13/18 121/82   04/11/18 (!) 142/94    Weight from Last 3 Encounters:   05/08/18 256 lb (116.1 kg)   04/13/18 249 lb (112.9 kg)   04/11/18 246 lb (111.6 kg)              We Performed the Following     ALLERGY/ASTHMA ADULT REFERRAL     Hemoglobin A1c          Today's Medication Changes          These changes are accurate as of 5/8/18 10:15 AM.  If you have any questions, ask your nurse or doctor.               Start taking these medicines.        Dose/Directions    albuterol 108 (90 Base) MCG/ACT Inhaler   Commonly known as:  PROAIR HFA/PROVENTIL HFA/VENTOLIN HFA   Used for:  Chronic cough   Started by:  Mckenna Caldwell APRN CNP        Dose:  2 puff   Inhale 2 puffs into the lungs every 6 hours as needed for shortness of breath / dyspnea or wheezing   Quantity:  1 Inhaler   Refills:  0            Where to get your medicines      These medications were sent to [facility] [place] [place] [address]  [address] [facility] [address]     Phone:  [phone]     albuterol 108 (90 Base) MCG/ACT Inhaler                Primary Care Provider Office Phone # Fax #    MICHELLE Garcia [phone] [address] [facility] [address]        Equal Access to Services     JEVON CAMPOS AH: Chandu Modi, waaxda luqadaha, qaybta kaalmada adeegyada, cristal carranza. [facility] [phone].    ATENCIÓN: Si habla español, tiene a king disposición servicios gratuitos de asistencia lingüística. Llame al [phone].    We comply with applicable federal civil rights laws and [place] laws. We do not discriminate on the basis of race, color, national " origin, age, disability, sex, sexual orientation, or gender identity.            Thank you!     Thank you for choosing [facility]  for your care. Our goal is always to provide you with excellent care. Hearing back from our patients is one way we can continue to improve our services. Please take a few minutes to complete the written survey that you may receive in the mail after your visit with us. Thank you!             Your Updated Medication List - Protect others around you: Learn how to safely use, store and throw away your medicines at www.disposemymeds.org.          This list is accurate as of 5/8/18 10:15 AM.  Always use your most recent med list.                   Brand Name Dispense Instructions for use Diagnosis    albuterol 108 (90 Base) MCG/ACT Inhaler    PROAIR HFA/PROVENTIL HFA/VENTOLIN HFA    1 Inhaler    Inhale 2 puffs into the lungs every 6 hours as needed for shortness of breath / dyspnea or wheezing    Chronic cough       ALPRAZolam 0.25 MG tablet    XANAX    15 tablet    Take 1 tablet (0.25 mg) by mouth 3 times daily as needed for anxiety    Anxiety       aspirin EC 81 MG EC tablet     360 tablet    Take 4 tablets (325 mg) by mouth daily    Type 2 diabetes mellitus without complication, without long-term current use of insulin (H)       blood glucose lancing device     30 each    Use to test blood sugars 1 times daily or as directed.    Type 2 diabetes mellitus without complications (H)       blood glucose monitoring meter device kit     1 kit    Use to test blood sugars 1 times daily or as directed.    Type 2 diabetes mellitus without complications (H)       blood glucose monitoring test strip    ONETOUCH ULTRA    1 Box    Use to test blood sugars 1 times daily or as directed.    Type 2 diabetes mellitus without complications (H)       fluticasone 50 MCG/ACT spray    FLONASE    1 Bottle    Spray 1-2 sprays into both nostrils daily    Chronic rhinitis, unspecified type        lisinopril 10 MG tablet    PRINIVIL/ZESTRIL    90 tablet    Take 1 tablet (10 mg) by mouth daily    Essential hypertension, benign       metFORMIN 500 MG 24 hr tablet    GLUCOPHAGE-XR    90 tablet    Take 1 tablet (500 mg) by mouth every evening    Type 2 diabetes mellitus without complication, without long-term current use of insulin (H)       order for DME      Phi was set up on [employer]  Type [employer] Auto (System One 60 series)  Serial Number: [id number] Modem Number: [id number] Pressure: AUTO-TITRATE CPAP 7-12 cm H20 Mask of choice: AIRFIT F 10 FULL FACE MASK Size: MEDIUM HEATED TUBING AND MODEM PROVIDED        pantoprazole 40 MG EC tablet    PROTONIX    90 tablet    TAKE 1 TABLET BY MOUTH DAILY, 30 TO 60 MINUTES BEFORE A MEAL.    Gastroesophageal reflux disease without esophagitis       rosuvastatin 40 MG tablet    CRESTOR    90 tablet    Take 1 tablet (40 mg) by mouth daily    Hyperlipidemia LDL goal <100

## 2018-05-08 NOTE — PROGRESS NOTES
"  SUBJECTIVE:   Phi Ortiz Jr. is a 47 year old male who presents to clinic today for the following health issues:      ENT Symptoms             Symptoms: cc Present Absent Comment   Fever/Chills  x     Fatigue  x     Muscle Aches  x     Eye Irritation   x    Sneezing   x    Nasal Frandy/Drg   x    Sinus Pressure/Pain  x     Loss of smell   x    Dental pain   x    Sore Throat   x    Swollen Glands   x    Ear Pain/Fullness  x     Cough x x     Wheeze  x     Chest Pain  x     Shortness of breath  x     Rash   x    Other         Symptom duration:  1 week   Symptom severity:  mod   Treatments tried:  Rx cough medications and nasal spray   Contacts:  public             Problem list and histories reviewed & adjusted, as indicated.  Additional history: states he's had this cough for months now. He thinks it's his work environment that causes him to cough. He reports he's had to use an inhaler in the past and was told once he may have asthma but not much details regarding that.   Due for diabetes check up. States he's had a normal eye exam recently.   History of sleep apnea and is using his CPAP machine.  He reports being fatigued, he's \"always tired\"   States it \"might be\" my mood.       Patient Active Problem List   Diagnosis     GERD (gastroesophageal reflux disease)     Generalized anxiety disorder     Fatty liver     Family history of coronary artery disease     Hypertriglyceridemia     Vitamin D deficiency     Chest pain     Chronic ischemic heart disease     Hyperlipidemia LDL goal <70     Type 2 diabetes mellitus without complications (H)     Mild major depression (H)     overweight BMI greater than 35     Essential hypertension, benign     Past Surgical History:   Procedure Laterality Date     NO HISTORY OF SURGERY         Social History   Substance Use Topics     Smoking status: Never Smoker     Smokeless tobacco: Former User     Alcohol use Yes      Comment: 24 pack a month, intermittent drinks based off the " shift (1st, 2nd or 3rd) he's working that week.     Family History   Problem Relation Age of Onset     C.A.D. Father      heart attack 30's     CEREBROVASCULAR DISEASE Father      DIABETES Father      Hypertension Father      Hypertension Mother      GASTROINTESTINAL DISEASE Mother      colitis     DIABETES Maternal Grandmother      snores     Dementia Maternal Grandmother      C.A.D. Maternal Grandfather      MI, leg circulation problems     C.A.D. Paternal Grandfather      MI in 30s, snores     Neurologic Disorder Sister      vertigo.  fibromyalgia     Hyperlipidemia Daughter      Breast Cancer No family hx of      Cancer - colorectal No family hx of      Prostate Cancer No family hx of          Current Outpatient Prescriptions   Medication Sig Dispense Refill     albuterol (PROAIR HFA/PROVENTIL HFA/VENTOLIN HFA) 108 (90 Base) MCG/ACT Inhaler Inhale 2 puffs into the lungs every 6 hours as needed for shortness of breath / dyspnea or wheezing 1 Inhaler 0     aspirin EC 81 MG EC tablet Take 4 tablets (325 mg) by mouth daily 360 tablet 3     blood glucose (ONE TOUCH DELICA) lancing device Use to test blood sugars 1 times daily or as directed. 30 each 6     blood glucose monitoring (ONE TOUCH ULTRA 2) meter device kit Use to test blood sugars 1 times daily or as directed. 1 kit 0     blood glucose monitoring (ONE TOUCH ULTRA) test strip Use to test blood sugars 1 times daily or as directed. 1 Box 6     fluticasone (FLONASE) 50 MCG/ACT spray Spray 1-2 sprays into both nostrils daily 1 Bottle 1     lisinopril (PRINIVIL/ZESTRIL) 10 MG tablet Take 1 tablet (10 mg) by mouth daily 90 tablet 1     metFORMIN (GLUCOPHAGE-XR) 500 MG 24 hr tablet Take 1 tablet (500 mg) by mouth every evening 90 tablet 1     ORDER FOR DME, SET TO Phi BYERS was set up on Social[employer]  Type [employer] Auto (System One 60 series)  Serial Number: [id number] Modem Number: [id number] Pressure: AUTO-TITRATE CPAP 7-12 cm H20 Mask of choice:  AIRFIT F 10 FULL FACE MASK Size: MEDIUM HEATED TUBING AND MODEM PROVIDED       pantoprazole (PROTONIX) 40 MG EC tablet TAKE 1 TABLET BY MOUTH DAILY, 30 TO 60 MINUTES BEFORE A MEAL. 90 tablet 2     rosuvastatin (CRESTOR) 40 MG tablet Take 1 tablet (40 mg) by mouth daily 90 tablet 3     ALPRAZolam (XANAX) 0.25 MG tablet Take 1 tablet (0.25 mg) by mouth 3 times daily as needed for anxiety (Patient not taking: Reported on 12/11/2017) 15 tablet 0     [DISCONTINUED] metFORMIN (GLUCOPHAGE-XR) 500 MG 24 hr tablet Take 1 tablet (500 mg) by mouth every evening 90 tablet 1     No Known Allergies  Recent Labs   Lab Test  05/08/18   0951  04/13/18   1000  04/11/18   1145  12/11/17   0935  07/20/17   1004  06/12/17   0434  06/02/17   1159   10/06/16   1115   03/03/16   1419   A1C  7.1*   --    --    --    --    --   7.0*   --   6.1*   --   7.9*   LDL   --   Cannot estimate LDL when triglyceride exceeds 400 mg/dL  97   --    --   138*   --    --    --    --    --   85   HDL   --   24*   --    --   37*   --    --    --    --    --   32*   TRIG   --   600*   --    --   225*   --    --    --    --    --   247*   ALT   --    --   50  35   --   55   --    --    --    < >   --    CR   --    --   0.69  0.63*   --   0.75   --    < >  0.62*   < >  0.61*   GFRESTIMATED   --    --   >90  >90   --   >90  Non  GFR Calc     --    < >  >90  Non  GFR Calc     < >  >90  Non  GFR Calc     GFRESTBLACK   --    --   >90  >90   --   >90   GFR Calc     --    < >  >90   GFR Calc     < >  >90   GFR Calc     POTASSIUM   --    --   4.2  4.3   --   4.0   --    < >  3.9   < >  4.0   TSH   --    --   1.61   --    --    --    --    --   1.17   --    --     < > = values in this interval not displayed.      BP Readings from Last 3 Encounters:   05/08/18 125/80   04/13/18 121/82   04/11/18 (!) 142/94    Wt Readings from Last 3 Encounters:   05/08/18 256 lb (116.1 kg)  "  04/13/18 249 lb (112.9 kg)   04/11/18 246 lb (111.6 kg)                    Reviewed and updated as needed this visit by clinical staff  Tobacco  Allergies  Meds  Med Hx  Surg Hx  Fam Hx  Soc Hx      Reviewed and updated as needed this visit by Provider          ROS: 10 point ROS neg other than the symptoms noted above in the HPI.    OBJECTIVE:     /80 (BP Location: Right arm, Patient Position: Chair, Cuff Size: Adult Large)  Pulse 74  Temp 97.9  F (36.6  C) (Oral)  Resp 16  Ht 5' 11\" (1.803 m)  Wt 256 lb (116.1 kg)  SpO2 95%  BMI 35.7 kg/m2  Body mass index is 35.7 kg/(m^2).  GENERAL: healthy, alert and no distress  HENT: ear canals and TM's normal, pharynx with mild erythema  NECK: no adenopathy, no asymmetry  RESP: lungs clear to auscultation - no rales, rhonchi or wheezes, occasional dry cough  CV: regular rate and rhythm, normal S1 S2, no S3 or S4, no murmur  ABDOMEN: soft, nontender, obese  MS: no gross musculoskeletal defects noted  PSYCH: flat affect      Diagnostic Test Results:  Results for orders placed or performed in visit on 05/08/18 (from the past 24 hour(s))   Hemoglobin A1c   Result Value Ref Range    Hemoglobin A1C 7.1 (H) 0 - 5.6 %       ASSESSMENT/PLAN:             1. Chronic cough    - ALLERGY/ASTHMA ADULT REFERRAL  - XR Chest 2 Views; Future  - albuterol (PROAIR HFA/PROVENTIL HFA/VENTOLIN HFA) 108 (90 Base) MCG/ACT Inhaler; Inhale 2 puffs into the lungs every 6 hours as needed for shortness of breath / dyspnea or wheezing  Dispense: 1 Inhaler; Refill: 0  Will send for formal asthma/allergy testing with his reported history of asthma symptoms in his past.  Other causes of chronic cough reviewed including GERD and he reports that to be stable.   Try the inhaler and will notify of pending x ray results. No signs of any infection today, this seems more chronic.    2. Type 2 diabetes mellitus without complication, without long-term current use of insulin (H)    - Albumin Random " Urine Quantitative with Creat Ratio; Future  - Hemoglobin A1c  Adequate control, continue same medication and treatment.    3. Fatigue, unspecified type    Reviewed his labs including CBC and thyroid were normal. He's had sleep evaluation and encouraged consistent use of CPAP. Reviewed depression as possible cause of his ongoing fatigue.  Will re evaluate after lung testing done.      4. Gastroesophageal reflux disease without esophagitis    Denies any concerns with this, daily Protonix.    5. Mild major depression (H)        See Patient Instructions  Patient Instructions   Will notify of pending chest x ray results.  Try the inhaler for cough.  See asthma/allergy specialist for further evaluation.  Follow up if symptoms persist or worsen and as needed.        Thank you for choosing [facility].  You may be receiving a survey in the mail from [employer] regarding your visit today.  Please take a few minutes to complete and return the survey to let us know how we are doing.      Our Clinic hours are:  Mondays    7:20 am - 7 pm  Tues -  Fri  7:20 am - 5 pm    Clinic Phone: [phone]    The clinic lab opens at 7:30 am Mon - Fri and appointments are required.    [facility] [place]  Ph. [phone]  Monday-Thursday 8 am - 7pm  Tues/Wed/Fri 8 am - 5:30 pm             MICHELLE Garcia CNP  [facility]

## 2018-05-08 NOTE — PATIENT INSTRUCTIONS
Will notify of pending chest x ray results.  Try the inhaler for cough.  See asthma/allergy specialist for further evaluation.  Follow up if symptoms persist or worsen and as needed.        Thank you for choosing [facility].  You may be receiving a survey in the mail from [employer] regarding your visit today.  Please take a few minutes to complete and return the survey to let us know how we are doing.      Our Clinic hours are:  Mondays    7:20 am - 7 pm  Tues -  Fri  7:20 am - 5 pm    Clinic Phone: [phone]    The clinic lab opens at 7:30 am Mon - Fri and appointments are required.    [facility] [place]  Ph. [phone]  Monday-Thursday 8 am - 7pm  Tues/Wed/Fri 8 am - 5:30 pm

## 2018-07-24 ENCOUNTER — TELEPHONE (OUTPATIENT)
Dept: FAMILY MEDICINE | Facility: CLINIC | Age: 48
End: 2018-07-24

## 2018-07-24 ENCOUNTER — HOSPITAL ENCOUNTER (EMERGENCY)
Facility: CLINIC | Age: 48
Discharge: HOME | End: 2018-07-24
Attending: EMERGENCY MEDICINE | Admitting: EMERGENCY MEDICINE
Payer: COMMERCIAL

## 2018-07-24 VITALS
RESPIRATION RATE: 18 BRPM | DIASTOLIC BLOOD PRESSURE: 93 MMHG | SYSTOLIC BLOOD PRESSURE: 132 MMHG | OXYGEN SATURATION: 96 % | TEMPERATURE: 98.6 F

## 2018-07-24 DIAGNOSIS — R07.9 ACUTE CHEST PAIN: ICD-10-CM

## 2018-07-24 LAB
ALBUMIN SERPL-MCNC: 4.1 G/DL (ref 3.4–5)
ALP SERPL-CCNC: 59 U/L (ref 40–150)
ALT SERPL W P-5'-P-CCNC: 107 U/L (ref 0–70)
ANION GAP SERPL CALCULATED.3IONS-SCNC: 6 MMOL/L (ref 3–14)
AST SERPL W P-5'-P-CCNC: 43 U/L (ref 0–45)
BASOPHILS # BLD AUTO: 0 10E9/L (ref 0–0.2)
BASOPHILS NFR BLD AUTO: 0.3 %
BILIRUB SERPL-MCNC: 0.7 MG/DL (ref 0.2–1.3)
BUN SERPL-MCNC: 16 MG/DL (ref 7–30)
CALCIUM SERPL-MCNC: 9.3 MG/DL (ref 8.5–10.1)
CHLORIDE SERPL-SCNC: 108 MMOL/L (ref 94–109)
CO2 SERPL-SCNC: 26 MMOL/L (ref 20–32)
CREAT SERPL-MCNC: 0.75 MG/DL (ref 0.66–1.25)
DIFFERENTIAL METHOD BLD: NORMAL
EOSINOPHIL # BLD AUTO: 0.1 10E9/L (ref 0–0.7)
EOSINOPHIL NFR BLD AUTO: 1 %
ERYTHROCYTE [DISTWIDTH] IN BLOOD BY AUTOMATED COUNT: 12.1 % (ref 10–15)
GFR SERPL CREATININE-BSD FRML MDRD: >90 ML/MIN/1.7M2
GLUCOSE SERPL-MCNC: 179 MG/DL (ref 70–99)
HCT VFR BLD AUTO: 45.5 % (ref 40–53)
HGB BLD-MCNC: 15.6 G/DL (ref 13.3–17.7)
IMM GRANULOCYTES # BLD: 0 10E9/L (ref 0–0.4)
IMM GRANULOCYTES NFR BLD: 0.2 %
LYMPHOCYTES # BLD AUTO: 0.8 10E9/L (ref 0.8–5.3)
LYMPHOCYTES NFR BLD AUTO: 13.9 %
MCH RBC QN AUTO: 31.3 PG (ref 26.5–33)
MCHC RBC AUTO-ENTMCNC: 34.3 G/DL (ref 31.5–36.5)
MCV RBC AUTO: 91 FL (ref 78–100)
MONOCYTES # BLD AUTO: 0.5 10E9/L (ref 0–1.3)
MONOCYTES NFR BLD AUTO: 9.2 %
NEUTROPHILS # BLD AUTO: 4.4 10E9/L (ref 1.6–8.3)
NEUTROPHILS NFR BLD AUTO: 75.4 %
NRBC # BLD AUTO: 0 10*3/UL
NRBC BLD AUTO-RTO: 0 /100
PLATELET # BLD AUTO: 174 10E9/L (ref 150–450)
POTASSIUM SERPL-SCNC: 4 MMOL/L (ref 3.4–5.3)
PROT SERPL-MCNC: 8.2 G/DL (ref 6.8–8.8)
RBC # BLD AUTO: 4.99 10E12/L (ref 4.4–5.9)
SODIUM SERPL-SCNC: 140 MMOL/L (ref 133–144)
TROPONIN I SERPL-MCNC: <0.015 UG/L (ref 0–0.04)
TROPONIN I SERPL-MCNC: <0.015 UG/L (ref 0–0.04)
WBC # BLD AUTO: 5.9 10E9/L (ref 4–11)

## 2018-07-24 PROCEDURE — 93005 ELECTROCARDIOGRAM TRACING: CPT | Mod: 76 | Performed by: EMERGENCY MEDICINE

## 2018-07-24 PROCEDURE — 93010 ELECTROCARDIOGRAM REPORT: CPT | Mod: Z6 | Performed by: EMERGENCY MEDICINE

## 2018-07-24 PROCEDURE — 93005 ELECTROCARDIOGRAM TRACING: CPT | Performed by: EMERGENCY MEDICINE

## 2018-07-24 PROCEDURE — 80053 COMPREHEN METABOLIC PANEL: CPT | Performed by: EMERGENCY MEDICINE

## 2018-07-24 PROCEDURE — 25000132 ZZH RX MED GY IP 250 OP 250 PS 637: Performed by: EMERGENCY MEDICINE

## 2018-07-24 PROCEDURE — 85025 COMPLETE CBC W/AUTO DIFF WBC: CPT | Performed by: EMERGENCY MEDICINE

## 2018-07-24 PROCEDURE — 25000125 ZZHC RX 250: Performed by: EMERGENCY MEDICINE

## 2018-07-24 PROCEDURE — 84484 ASSAY OF TROPONIN QUANT: CPT | Performed by: EMERGENCY MEDICINE

## 2018-07-24 PROCEDURE — 99284 EMERGENCY DEPT VISIT MOD MDM: CPT | Mod: 25 | Performed by: EMERGENCY MEDICINE

## 2018-07-24 PROCEDURE — 99284 EMERGENCY DEPT VISIT MOD MDM: CPT | Performed by: EMERGENCY MEDICINE

## 2018-07-24 RX ORDER — NITROGLYCERIN 0.4 MG/1
0.4 TABLET SUBLINGUAL EVERY 5 MIN PRN
Qty: 25 TABLET | Refills: 0 | Status: SHIPPED | OUTPATIENT
Start: 2018-07-24 | End: 2018-11-12

## 2018-07-24 RX ORDER — ALUMINA, MAGNESIA, AND SIMETHICONE 2400; 2400; 240 MG/30ML; MG/30ML; MG/30ML
SUSPENSION ORAL
Status: DISCONTINUED
Start: 2018-07-24 | End: 2018-07-24 | Stop reason: HOSPADM

## 2018-07-24 RX ORDER — PANTOPRAZOLE SODIUM 40 MG/1
40 TABLET, DELAYED RELEASE ORAL DAILY
Qty: 30 TABLET | Refills: 0 | Status: SHIPPED | OUTPATIENT
Start: 2018-07-24 | End: 2018-08-23

## 2018-07-24 RX ADMIN — LIDOCAINE HYDROCHLORIDE 30 ML: 20 SOLUTION ORAL; TOPICAL at 14:07

## 2018-07-24 NOTE — ED NOTES
"Pt presents with c/o CP and shoulder pain earlier, since pt arrived in ED, CP has improved.  Pt denies hx of cardiac issues, has had angio and stress test in the past.  Pt states he was \"running\" the dogs around 1030 this AM, when he felt a sudden sharp pain in chest area, that radiated to shoulders.  Pt has hx of GERD and states that since his pain as resolved he has been left with a sore throat.  Denies any injury.  States increased fatigue past week, denies illness.  Pt A&Ox4.  VSS.    "

## 2018-07-24 NOTE — LETTER
July 24, 2018      To Whom It May Concern:      Phi Ortiz Jr. was seen in our Emergency Department today, Tuesday 07/24/18.  Please excuse him from work today.  Sincerely,

## 2018-07-24 NOTE — TELEPHONE ENCOUNTER
"S-(situation): Spoke with pt.  He reports chest pain.  He explains that it has been going on for about 30 min. 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  It is a constant pain that \"just hurts!\"  Pt rates the pain at a 7-8 of 10.  Pt says the pain moves toward his stomach and throat.  Pt was nauseous yesterday but is not today.  The pain does not radiate through to his back.  The pain may be reproducible but pt is not sure.  Denies shortness of breath or difficulty breathing.  Denies palpitations.  Denies light-headed or dizzy.  Denies wheezing.     B-(background): h/o chronic ischemic heart disease, T2DM, hyperlipidemia    A-(assessment): chest pain x 30 min not relieved by rest.    R-(recommendations): Advised ED now for further eval.  Instructed not to drive self.  Instructed to stop and call 911 if develops difficulty breathing.  Thi Venegas RN      "

## 2018-07-24 NOTE — ED PROVIDER NOTES
"  History     Chief Complaint   Patient presents with     Chest Pain     chest and epigastric pain, ran out of antiacids recently, throat discomfort     HPI  Phi Ortiz Jr. is a 47 year old male who presents to emergency department for evaluation chest pain which began at approximately 11:30 AM this morning while walking his dogs.  Pain is located in the epigastric area, lower chest and radiates to the anterior neck and left shoulder.  No associated nausea, sweating/diaphoresis or shortness of breath.  Chest pain is poorly described or characterized (\"I do not know it's just a pain\"), was moderate in severity and past spontaneously resolved.  No chest pain at the present time, but mild residual left shoulder discomfort.  Now reports an \"acidy feeling\" in his stomach.  He ran out of Protonix yesterday.  Long-standing history of GERD, but he says that the symptoms are somewhat different than previous GERD.  No URI symptoms, cough, hemoptysis, shortness of breath, fever or chills.  No leg pain or leg swelling.  No other acute complaints or concerns.  He takes a full-strength aspirin daily    Previous Records Reviewed:  Coronary angiography 1/30/14: Focal 30% mid LAD coronary artery disease, no hemodynamically significant lesions.  Nonobstructive coronary artery disease.    Problem List:    Patient Active Problem List    Diagnosis Date Noted     Essential hypertension, benign 01/26/2016     Priority: Medium     overweight BMI greater than 35 10/12/2015     Priority: Medium     Mild major depression (H) 12/29/2014     Priority: Medium     Hyperlipidemia LDL goal <70 10/09/2014     Priority: Medium     Type 2 diabetes mellitus without complications (H) 10/09/2014     Priority: Medium     Chronic ischemic heart disease 04/22/2014     Priority: Medium     Chest pain 01/27/2014     Priority: Medium     Family history of coronary artery disease 04/18/2013     Priority: Medium     Father in 30's       " Hypertriglyceridemia 04/18/2013     Priority: Medium     Drinks sodas       Vitamin D deficiency 04/18/2013     Priority: Medium     April 18, 2013   Has vit D deficiency -  Vitamin D 57131 IU a day for 8 weeks then  over the counter vit D supplement  2000iu daily thereafter       Problem list name updated by automated process. Provider to review and confirm  [employer] Update utility       Fatty liver 09/09/2010     Priority: Medium     All workup negative including hep titers and ultrasound positive for this       GERD (gastroesophageal reflux disease) 08/24/2010     Priority: Medium     Generalized anxiety disorder 08/24/2010     Priority: Medium        Past Medical History:    Past Medical History:   Diagnosis Date     Anxiety      Chest pain 1/27/2014     GERD (gastroesophageal reflux disease)        Past Surgical History:    Past Surgical History:   Procedure Laterality Date     NO HISTORY OF SURGERY         Family History:    Family History   Problem Relation Age of Onset     C.A.D. Father      heart attack 30's     Cerebrovascular Disease Father      Diabetes Father      Hypertension Father      Hypertension Mother      GASTROINTESTINAL DISEASE Mother      colitis     Diabetes Maternal Grandmother      snores     Dementia Maternal Grandmother      C.A.D. Maternal Grandfather      MI, leg circulation problems     C.A.D. Paternal Grandfather      MI in 30s, snores     Neurologic Disorder Sister      vertigo.  fibromyalgia     Hyperlipidemia Daughter      Breast Cancer No family hx of      Cancer - colorectal No family hx of      Prostate Cancer No family hx of        Social History:  Marital Status:   [2]  Social History   Substance Use Topics     Smoking status: Never Smoker     Smokeless tobacco: Former User     Alcohol use Yes      Comment: 24 pack a month, intermittent drinks based off the shift (1st, 2nd or 3rd) he's working that week.        Medications:      aspirin EC 81 MG EC tablet   lisinopril  (PRINIVIL/ZESTRIL) 10 MG tablet   metFORMIN (GLUCOPHAGE-XR) 500 MG 24 hr tablet   nitroGLYcerin (NITROSTAT) 0.4 MG sublingual tablet   pantoprazole (PROTONIX) 40 MG EC tablet   pantoprazole (PROTONIX) 40 MG EC tablet   rosuvastatin (CRESTOR) 40 MG tablet   albuterol (PROAIR HFA/PROVENTIL HFA/VENTOLIN HFA) 108 (90 Base) MCG/ACT Inhaler   blood glucose (ONE TOUCH DELICA) lancing device   blood glucose monitoring (ONE TOUCH ULTRA 2) meter device kit   blood glucose monitoring (ONE TOUCH ULTRA) test strip   fluticasone (FLONASE) 50 MCG/ACT spray   ORDER FOR DME, SET TO FAX,       Review of Systems  As mentioned above in the history present illness.  All other systems were reviewed and are negative.    Physical Exam   BP: 125/83  Heart Rate: 88  Temp: 98.6  F (37  C)  Resp: 18  SpO2: 98 %      Physical Exam   Constitutional: He is oriented to person, place, and time. He appears well-developed and well-nourished. No distress.   HENT:   Head: Normocephalic and atraumatic.   Mouth/Throat: Oropharynx is clear and moist.   Eyes: Conjunctivae and EOM are normal. No scleral icterus.   Neck: Normal range of motion. Neck supple. No tracheal deviation present.   Cardiovascular: Normal rate, regular rhythm, normal heart sounds and intact distal pulses.  Exam reveals no gallop and no friction rub.    No murmur heard.  Pulmonary/Chest: Effort normal and breath sounds normal. No respiratory distress. He has no wheezes. He has no rales.   Abdominal: Soft. Bowel sounds are normal. He exhibits no distension. There is tenderness (mild epigastric tenderness). There is no rebound and no guarding.   Musculoskeletal: Normal range of motion. He exhibits no edema or tenderness.   Neurological: He is alert and oriented to person, place, and time.   Skin: Skin is warm and dry. No rash noted. He is not diaphoretic. No erythema. No pallor.   Psychiatric: He has a normal mood and affect. His behavior is normal.   Nursing note and vitals  reviewed.      ED Course     ED Course     Procedures               EKG Interpretation:      Interpreted by Lee Baeza MD  Time reviewed: Upon completion  Symptoms at time of EKG: Left shoulder pain  Rhythm: normal sinus   Rate: normal  Axis: normal  Ectopy: none  Conduction: normal  ST Segments/ T Waves: No ST-T wave changes  Q Waves: none  Comparison to prior: Unchanged from 4/13/18  Clinical Impression: normal EKG       Repeat EKG Interpretation:      Interpreted by Lee Baeza MD  Time reviewed: 1627 pm  Symptoms at time of EKG: None   Rhythm: Normal sinus   Rate: Normal  Axis: Normal  Ectopy: None  Conduction: Normal  ST Segments/ T Waves: No ST-T wave changes and No acute ischemic changes  Q Waves: None  Comparison to prior: Unchanged from initial EKG in the ED  Clinical Impression: normal EKG           Results for orders placed or performed during the hospital encounter of 07/24/18 (from the past 24 hour(s))   Comprehensive metabolic panel   Result Value Ref Range    Sodium 140 133 - 144 mmol/L    Potassium 4.0 3.4 - 5.3 mmol/L    Chloride 108 94 - 109 mmol/L    Carbon Dioxide 26 20 - 32 mmol/L    Anion Gap 6 3 - 14 mmol/L    Glucose 179 (H) 70 - 99 mg/dL    Urea Nitrogen 16 7 - 30 mg/dL    Creatinine 0.75 0.66 - 1.25 mg/dL    GFR Estimate >90 >60 mL/min/1.7m2    GFR Estimate If Black >90 >60 mL/min/1.7m2    Calcium 9.3 8.5 - 10.1 mg/dL    Bilirubin Total 0.7 0.2 - 1.3 mg/dL    Albumin 4.1 3.4 - 5.0 g/dL    Protein Total 8.2 6.8 - 8.8 g/dL    Alkaline Phosphatase 59 40 - 150 U/L     (H) 0 - 70 U/L    AST 43 0 - 45 U/L   Troponin I   Result Value Ref Range    Troponin I ES <0.015 0.000 - 0.045 ug/L   CBC with platelets differential   Result Value Ref Range    WBC 5.9 4.0 - 11.0 10e9/L    RBC Count 4.99 4.4 - 5.9 10e12/L    Hemoglobin 15.6 13.3 - 17.7 g/dL    Hematocrit 45.5 40.0 - 53.0 %    MCV 91 78 - 100 fl    MCH 31.3 26.5 - 33.0 pg    MCHC 34.3 31.5 - 36.5 g/dL    RDW 12.1 10.0 - 15.0 %     Platelet Count 174 150 - 450 10e9/L    Diff Method Automated Method     % Neutrophils 75.4 %    % Lymphocytes 13.9 %    % Monocytes 9.2 %    % Eosinophils 1.0 %    % Basophils 0.3 %    % Immature Granulocytes 0.2 %    Nucleated RBCs 0 0 /100    Absolute Neutrophil 4.4 1.6 - 8.3 10e9/L    Absolute Lymphocytes 0.8 0.8 - 5.3 10e9/L    Absolute Monocytes 0.5 0.0 - 1.3 10e9/L    Absolute Eosinophils 0.1 0.0 - 0.7 10e9/L    Absolute Basophils 0.0 0.0 - 0.2 10e9/L    Abs Immature Granulocytes 0.0 0 - 0.4 10e9/L    Absolute Nucleated RBC 0.0    Troponin I (second draw)   Result Value Ref Range    Troponin I ES <0.015 0.000 - 0.045 ug/L       Medications   alum & mag hydroxide-simethicone (MYLANTA ES/MAALOX  ES) 400-400-40 MG/5ML suspension (  Canceled Entry 7/24/18 1409)   lidocaine (viscous) (XYLOCAINE) 2 % 15 mL, alum & mag hydroxide-simethicone (MYLANTA ES/MAALOX  ES) 15 mL GI Cocktail (30 mLs Oral Given 7/24/18 1407)       Assessments & Plan (with Medical Decision Making)   Pt with hx of GERD and Coronary Agiography 1/30/14 (after an abnl stress test for chest pains) showing only focal 30% mid LAD coronary artery disease, no hemodynamically significant lesions, nonobstructive coronary artery disease, who presents for lower central CP and epigastric pain after running out of Protonix yesterday. Probable GERD sx. Doubt angina/ACS, aneurysm/dissection, PE/DVT or emergent GI/ disease process. Serial EKGs and troponins negative. Will refill Protonix and rx NTG to use prn. He will f/u with his PMD asap. He was provided instructions for supportive care and will return as needed for worsened condition or worsening symptoms, or new problems or concerns.      I have reviewed the nursing notes.    I have reviewed the findings, diagnosis, plan and need for follow up with the patient.      Discharge Medication List as of 7/24/2018  4:35 PM      START taking these medications    Details   nitroGLYcerin (NITROSTAT) 0.4 MG  sublingual tablet Place 1 tablet (0.4 mg) under the tongue every 5 minutes as needed for chest pain (CALL 911 IF NOT IMPROVED AFTER THREE CONSECUTIVE DOSES), Disp-25 tablet, R-0, E-Prescribe      !! pantoprazole (PROTONIX) 40 MG EC tablet Take 1 tablet (40 mg) by mouth daily for 30 doses, Disp-30 tablet, R-0, E-Prescribe       !! - Potential duplicate medications found. Please discuss with provider.          Final diagnoses:   Acute chest pain       7/24/2018   [facility] EMERGENCY DEPARTMENT     Lee Baeza MD  07/30/18 0714

## 2018-07-24 NOTE — ED AVS SNAPSHOT
[facility] Emergency Department    [address] [address]    Phone:  [phone]    Fax:  [phone]                                       Phi Ortiz Jr.   MRN: [id number]    Department:  [facility] Emergency Department   Date of Visit:  7/24/2018           After Visit Summary Signature Page     I have received my discharge instructions, and my questions have been answered. I have discussed any challenges I see with this plan with the nurse or doctor.    ..........................................................................................................................................  Patient/Patient Representative Signature      ..........................................................................................................................................  Patient Representative Print Name and Relationship to Patient    ..................................................               ................................................  Date                                            Time    ..........................................................................................................................................  Reviewed by Signature/Title    ...................................................              ..............................................  Date                                                            Time

## 2018-07-24 NOTE — DISCHARGE INSTRUCTIONS
*CHEST PAIN, UNCERTAIN CAUSE    Based on your exam today, the exact cause of your chest pain is not certain. Your condition does not seem serious at this time, and your pain does not appear to be coming from your heart. However, sometimes the signs of a serious problem take more time to appear. Therefore, watch for the warning signs listed below.  HOME CARE:    1. Rest today and avoid strenuous activity.  2. Take any prescribed medicine as directed.  FOLLOW UP with your doctor in 1-3 days.   GET PROMPT MEDICAL ATTENTION if any of the following occur:    A change in the type of pain: if it feels different, becomes more severe, lasts longer, or begins to spread into your shoulder, arm, neck, jaw or back    Shortness of breath or increased pain with breathing    Weakness, dizziness, or fainting    Cough with blood or dark colored sputum (phlegm)    Fever over 101  F (38.3  C)    Swelling, pain or redness in one leg    4913-3238 [employer]. [address] [place] [address]. All rights reserved. This information is not intended as a substitute for professional medical care. Always follow your healthcare professional's instructions.  This information has been modified by your health care provider with permission from the publisher.      *CHEST PAIN, NONCARDIAC    Based on your visit today, the exact cause of your chest pain is not certain. Your condition does not seem serious and your pain does not appear to be coming from your heart. However, sometimes the signs of a serious problem take more time to appear. Therefore, please watch for the warning signs listed below.  HOME CARE:  1. Rest today and avoid strenuous activity.  2. Take any prescribed medicine as directed.  FOLLOW UP with your doctor in 1-3 days.   GET PROMPT MEDICAL ATTENTION if any of the following occur:    A change in the type of pain: if it feels different, becomes more severe, lasts longer, or begins to spread into your  shoulder, arm, neck, jaw or back    Shortness of breath or increased pain with breathing    Cough with blood or dark colored sputum (phlegm)    Weakness, dizziness, or fainting    Fever over 101  F (38.3  C)    Swelling, pain or redness in one leg    0320-6725 [employer]. [address] [place] [address]. All rights reserved. This information is not intended as a substitute for professional medical care. Always follow your healthcare professional's instructions.  This information has been modified by your health care provider with permission from the publisher.

## 2018-07-24 NOTE — ED AVS SNAPSHOT
[facility] Emergency Department    [address] [facility] [address]    Phone:  [phone]    Fax:  [phone]                                       Phi Ortiz Jr.   MRN: [id number]    Department:  [facility] Emergency Department   Date of Visit:  7/24/2018           Patient Information     Date Of Birth          1970        Your diagnoses for this visit were:     Acute chest pain        You were seen by Lee Baeza MD.      Follow-up Information     Schedule an appointment as soon as possible for a visit with Mckenna Caldwell APRN CNP.    Specialty:  Nurse Practitioner - Family    Contact information:    [address]  [facility] [address]  [phone]          Follow up with [facility].    Specialty:  EMERGENCY MEDICINE    Why:  If symptoms worsen or return    Contact information:    [address] [place] [id number] [phone]    Additional information:    The medical center is located at   [address] [place] [address] (between [facility] and   [facility] in [place], four miles north   of [place]).        Schedule an appointment as soon as possible for a visit with Denton Dey MD.    Specialty:  Internal Medicine - Interventional Cardiology    Contact information:    [address]  [facility] [address] [phone]          Discharge Instructions          *CHEST PAIN, UNCERTAIN CAUSE    Based on your exam today, the exact cause of your chest pain is not certain. Your condition does not seem serious at this time, and your pain does not appear to be coming from your heart. However, sometimes the signs of a serious problem take more time to appear. Therefore, watch for the warning signs listed below.  HOME CARE:    1. Rest today and avoid strenuous activity.  2. Take any prescribed medicine as directed.  FOLLOW UP with your doctor in 1-3 days.   GET PROMPT MEDICAL ATTENTION if any of the following occur:    A change  in the type of pain: if it feels different, becomes more severe, lasts longer, or begins to spread into your shoulder, arm, neck, jaw or back    Shortness of breath or increased pain with breathing    Weakness, dizziness, or fainting    Cough with blood or dark colored sputum (phlegm)    Fever over 101  F (38.3  C)    Swelling, pain or redness in one leg    1008-0112 [employer]. [address] [place] [address]. All rights reserved. This information is not intended as a substitute for professional medical care. Always follow your healthcare professional's instructions.  This information has been modified by your health care provider with permission from the publisher.      *CHEST PAIN, NONCARDIAC    Based on your visit today, the exact cause of your chest pain is not certain. Your condition does not seem serious and your pain does not appear to be coming from your heart. However, sometimes the signs of a serious problem take more time to appear. Therefore, please watch for the warning signs listed below.  HOME CARE:  1. Rest today and avoid strenuous activity.  2. Take any prescribed medicine as directed.  FOLLOW UP with your doctor in 1-3 days.   GET PROMPT MEDICAL ATTENTION if any of the following occur:    A change in the type of pain: if it feels different, becomes more severe, lasts longer, or begins to spread into your shoulder, arm, neck, jaw or back    Shortness of breath or increased pain with breathing    Cough with blood or dark colored sputum (phlegm)    Weakness, dizziness, or fainting    Fever over 101  F (38.3  C)    Swelling, pain or redness in one leg    7893-4887 [employer]. [address] [place] [address]. All rights reserved. This information is not intended as a substitute for professional medical care. Always follow your healthcare professional's instructions.  This information has been modified by your health care provider with permission from  the [facility].      24 Hour Appointment Hotline       To make an appointment at any [facility], call [phone] ([phone]). If you don't have a family doctor or clinic, we will help you find one. [facility] are conveniently located to serve the needs of you and your family.             Review of your medicines      START taking        Dose / Directions Last dose taken    nitroGLYcerin 0.4 MG sublingual tablet   Commonly known as:  NITROSTAT   Dose:  0.4 mg   Quantity:  25 tablet        Place 1 tablet (0.4 mg) under the tongue every 5 minutes as needed for chest pain (CALL 432 IF NOT IMPROVED AFTER THREE CONSECUTIVE DOSES)   Refills:  0          Our records show that you are taking the medicines listed below. If these are incorrect, please call your family doctor or clinic.        Dose / Directions Last dose taken    albuterol 108 (90 Base) MCG/ACT Inhaler   Commonly known as:  PROAIR HFA/PROVENTIL HFA/VENTOLIN HFA   Dose:  2 puff   Quantity:  1 Inhaler        Inhale 2 puffs into the lungs every 6 hours as needed for shortness of breath / dyspnea or wheezing   Refills:  0        aspirin 81 MG EC tablet   Dose:  325 mg   Quantity:  360 tablet        Take 4 tablets (325 mg) by mouth daily   Refills:  3        blood glucose lancing device   Quantity:  30 each        Use to test blood sugars 1 times daily or as directed.   Refills:  6        blood glucose monitoring meter device kit   Quantity:  1 kit        Use to test blood sugars 1 times daily or as directed.   Refills:  0        blood glucose monitoring test strip   Commonly known as:  ONETOUCH ULTRA   Quantity:  1 Box        Use to test blood sugars 1 times daily or as directed.   Refills:  6        fluticasone 50 MCG/ACT spray   Commonly known as:  FLONASE   Dose:  1-2 spray   Quantity:  1 Bottle        Spray 1-2 sprays into both nostrils daily   Refills:  1        lisinopril 10 MG tablet   Commonly known as:  PRINIVIL/ZESTRIL   Dose:  10 mg    Quantity:  90 tablet        Take 1 tablet (10 mg) by mouth daily   Refills:  1        metFORMIN 500 MG 24 hr tablet   Commonly known as:  GLUCOPHAGE-XR   Dose:  500 mg   Quantity:  90 tablet        Take 1 tablet (500 mg) by mouth every evening   Refills:  1        order for PATRICIA Yip was set up on [employer]  Type [employer] Auto (System One 60 series)  Serial Number: [id number] Modem Number: [id number] Pressure: AUTO-TITRATE CPAP 7-12 cm H20 Mask of choice: AIRFIT F 10 FULL FACE MASK Size: MEDIUM HEATED TUBING AND MODEM PROVIDED   Refills:  0        rosuvastatin 40 MG tablet   Commonly known as:  CRESTOR   Dose:  40 mg   Quantity:  90 tablet        Take 1 tablet (40 mg) by mouth daily   Refills:  3          ASK your doctor about these medications        Dose / Directions Last dose taken    * pantoprazole 40 MG EC tablet   Commonly known as:  PROTONIX   What changed:  Another medication with the same name was added. Make sure you understand how and when to take each.   Quantity:  90 tablet   Ask about: Which instructions should I use?        TAKE 1 TABLET BY MOUTH DAILY, 30 TO 60 MINUTES BEFORE A MEAL.   Refills:  2        * pantoprazole 40 MG EC tablet   Commonly known as:  PROTONIX   Dose:  40 mg   What changed:  You were already taking a medication with the same name, and this prescription was added. Make sure you understand how and when to take each.   Quantity:  30 tablet   Ask about: Which instructions should I use?        Take 1 tablet (40 mg) by mouth daily for 30 doses   Refills:  0        * Notice:  This list has 2 medication(s) that are the same as other medications prescribed for you. Read the directions carefully, and ask your doctor or other care provider to review them with you.            Prescriptions were sent or printed at these locations (2 Prescriptions)                   [facility] [place] - [address] [facility]   [address] [facility] [address]     Telephone:  [phone]   Fax:  [phone]   Hours:                  E-Prescribed (2 of 2)         nitroGLYcerin (NITROSTAT) 0.4 MG sublingual tablet               pantoprazole (PROTONIX) 40 MG EC tablet                Procedures and tests performed during your visit     CBC with platelets differential    Comprehensive metabolic panel    EKG 12 lead    EKG 12-lead, tracing only    Troponin I    Troponin I (second draw)      Orders Needing Specimen Collection     None      Pending Results     No orders found from 7/22/2018 to 7/25/2018.            Pending Culture Results     No orders found from 7/22/2018 to 7/25/2018.            Pending Results Instructions     If you had any lab results that were not finalized at the time of your Discharge, you can call the ED Lab Result RN at [phone]. You will be contacted by this team for any positive Lab results or changes in treatment. The nurses are available 7 days a week from 10A to 6:30P.  You can leave a message 24 hours per day and they will return your call.        Test Results From Your Hospital Stay        7/24/2018  2:20 PM      Component Results     Component Value Ref Range & Units Status    Sodium 140 133 - 144 mmol/L Final    Potassium 4.0 3.4 - 5.3 mmol/L Final    Chloride 108 94 - 109 mmol/L Final    Carbon Dioxide 26 20 - 32 mmol/L Final    Anion Gap 6 3 - 14 mmol/L Final    Glucose 179 (H) 70 - 99 mg/dL Final    Urea Nitrogen 16 7 - 30 mg/dL Final    Creatinine 0.75 0.66 - 1.25 mg/dL Final    GFR Estimate >90 >60 mL/min/1.7m2 Final    Non  GFR Calc    GFR Estimate If Black >90 >60 mL/min/1.7m2 Final    African American GFR Calc    Calcium 9.3 8.5 - 10.1 mg/dL Final    Bilirubin Total 0.7 0.2 - 1.3 mg/dL Final    Albumin 4.1 3.4 - 5.0 g/dL Final    Protein Total 8.2 6.8 - 8.8 g/dL Final    Alkaline Phosphatase 59 40 - 150 U/L Final     (H) 0 - 70 U/L Final    AST 43 0 - 45 U/L Final         7/24/2018  2:21 PM      Component  Results     Component Value Ref Range & Units Status    Troponin I ES <0.015 0.000 - 0.045 ug/L Final    The 99th percentile for upper reference range is 0.045 ug/L.  Troponin values   in the range of 0.045 - 0.120 ug/L may be associated with risks of adverse   clinical events.           7/24/2018  2:01 PM      Component Results     Component Value Ref Range & Units Status    WBC 5.9 4.0 - 11.0 10e9/L Final    RBC Count 4.99 4.4 - 5.9 10e12/L Final    Hemoglobin 15.6 13.3 - 17.7 g/dL Final    Hematocrit 45.5 40.0 - 53.0 % Final    MCV 91 78 - 100 fl Final    MCH 31.3 26.5 - 33.0 pg Final    MCHC 34.3 31.5 - 36.5 g/dL Final    RDW 12.1 10.0 - 15.0 % Final    Platelet Count 174 150 - 450 10e9/L Final    Diff Method Automated Method  Final    % Neutrophils 75.4 % Final    % Lymphocytes 13.9 % Final    % Monocytes 9.2 % Final    % Eosinophils 1.0 % Final    % Basophils 0.3 % Final    % Immature Granulocytes 0.2 % Final    Nucleated RBCs 0 0 /100 Final    Absolute Neutrophil 4.4 1.6 - 8.3 10e9/L Final    Absolute Lymphocytes 0.8 0.8 - 5.3 10e9/L Final    Absolute Monocytes 0.5 0.0 - 1.3 10e9/L Final    Absolute Eosinophils 0.1 0.0 - 0.7 10e9/L Final    Absolute Basophils 0.0 0.0 - 0.2 10e9/L Final    Abs Immature Granulocytes 0.0 0 - 0.4 10e9/L Final    Absolute Nucleated RBC 0.0  Final         7/24/2018  4:11 PM      Component Results     Component Value Ref Range & Units Status    Troponin I ES <0.015 0.000 - 0.045 ug/L Final    The 99th percentile for upper reference range is 0.045 ug/L.  Troponin values   in the range of 0.045 - 0.120 ug/L may be associated with risks of adverse   clinical events.                  Thank you for choosing [facility]       Thank you for choosing [facility] for your care. Our goal is always to provide you with excellent care. Hearing back from our patients is one way we can continue to improve our services. Please take a few minutes to complete the written survey that you may receive in the  "mail after you visit with us. Thank you!        [employer] Information     [employer] lets you send messages to your doctor, view your test results, renew your prescriptions, schedule appointments and more. To sign up, go to [email or web address] . Click on \"Log in\" on the left side of the screen, which will take you to the Welcome page. Then click on \"Sign up Now\" on the right side of the page.     You will be asked to enter the access code listed below, as well as some personal information. Please follow the directions to create your username and password.     Your access code is: [email or web address]  Expires: 10/22/2018  4:29 PM     Your access code will  in 90 days. If you need help or a new code, please call your [facility] clinic or [phone].        Care EveryWhere ID     This is your Care EveryWhere ID. This could be used by other organizations to access your [facility] medical records  [id number]        Equal Access to Services     JEVON CAMPOS : Hadii karen luongo Soleo, waaxda luqadaha, qaybta kaalmada adechristopheryaharish, cristal busby . So [facility] [phone].    ATENCIÓN: Si habla español, tiene a king disposición servicios gratuitos de asistencia lingüística. Llame al [phone].    We comply with applicable federal civil rights laws and [place] laws. We do not discriminate on the basis of race, color, national origin, age, disability, sex, sexual orientation, or gender identity.            After Visit Summary       This is your record. Keep this with you and show to your community pharmacist(s) and doctor(s) at your next visit.                  "

## 2018-09-13 ENCOUNTER — OFFICE VISIT (OUTPATIENT)
Dept: FAMILY MEDICINE | Facility: CLINIC | Age: 48
End: 2018-09-13
Payer: COMMERCIAL

## 2018-09-13 VITALS
OXYGEN SATURATION: 97 % | TEMPERATURE: 96.8 F | WEIGHT: 245.2 LBS | HEIGHT: 71 IN | DIASTOLIC BLOOD PRESSURE: 78 MMHG | SYSTOLIC BLOOD PRESSURE: 124 MMHG | BODY MASS INDEX: 34.33 KG/M2 | HEART RATE: 72 BPM | RESPIRATION RATE: 20 BRPM

## 2018-09-13 DIAGNOSIS — E11.9 TYPE 2 DIABETES MELLITUS WITHOUT COMPLICATION, WITHOUT LONG-TERM CURRENT USE OF INSULIN (H): Chronic | ICD-10-CM

## 2018-09-13 DIAGNOSIS — M25.50 PAIN IN JOINT, MULTIPLE SITES: ICD-10-CM

## 2018-09-13 DIAGNOSIS — K21.9 GASTROESOPHAGEAL REFLUX DISEASE, ESOPHAGITIS PRESENCE NOT SPECIFIED: Primary | ICD-10-CM

## 2018-09-13 DIAGNOSIS — I10 ESSENTIAL HYPERTENSION, BENIGN: Chronic | ICD-10-CM

## 2018-09-13 LAB
CRP SERPL-MCNC: 5.6 MG/L (ref 0–8)
ERYTHROCYTE [SEDIMENTATION RATE] IN BLOOD BY WESTERGREN METHOD: 8 MM/H (ref 0–15)
HBA1C MFR BLD: 5.7 % (ref 0–5.6)
TSH SERPL DL<=0.005 MIU/L-ACNC: 1.76 MU/L (ref 0.4–4)

## 2018-09-13 PROCEDURE — 84443 ASSAY THYROID STIM HORMONE: CPT | Performed by: NURSE PRACTITIONER

## 2018-09-13 PROCEDURE — 86618 LYME DISEASE ANTIBODY: CPT | Performed by: NURSE PRACTITIONER

## 2018-09-13 PROCEDURE — 83036 HEMOGLOBIN GLYCOSYLATED A1C: CPT | Performed by: NURSE PRACTITIONER

## 2018-09-13 PROCEDURE — 85652 RBC SED RATE AUTOMATED: CPT | Performed by: NURSE PRACTITIONER

## 2018-09-13 PROCEDURE — 36415 COLL VENOUS BLD VENIPUNCTURE: CPT | Performed by: NURSE PRACTITIONER

## 2018-09-13 PROCEDURE — 99214 OFFICE O/P EST MOD 30 MIN: CPT | Performed by: NURSE PRACTITIONER

## 2018-09-13 PROCEDURE — 86140 C-REACTIVE PROTEIN: CPT | Performed by: NURSE PRACTITIONER

## 2018-09-13 PROCEDURE — 86431 RHEUMATOID FACTOR QUANT: CPT | Performed by: NURSE PRACTITIONER

## 2018-09-13 RX ORDER — LOSARTAN POTASSIUM 25 MG/1
12.5 TABLET ORAL DAILY
Qty: 45 TABLET | Refills: 1 | Status: SHIPPED | OUTPATIENT
Start: 2018-09-13 | End: 2018-11-12

## 2018-09-13 RX ORDER — PANTOPRAZOLE SODIUM 40 MG/1
TABLET, DELAYED RELEASE ORAL
Qty: 90 TABLET | Refills: 3 | Status: SHIPPED | OUTPATIENT
Start: 2018-09-13 | End: 2019-07-29

## 2018-09-13 NOTE — PROGRESS NOTES
SUBJECTIVE:   Phi Ortiz Jr. is a 48 year old male who presents to clinic today for the following health issues:      Medication Followup of pantoprazole for GERD-requesting refill    Taking Medication as prescribed: yes    Side Effects:  None    Medication Helping Symptoms:  Yes- out of it for 2-3 days and having acid taste in mouth and throat and right sided upper back pain. Was in ER in July with chest pain after running out for 1 day. Denies dysphagia.      Joint Pain    Onset: 3-4 weeks    Description:   Location: all over in joints, feels like he is 100 yrs old or the day after a strong workout  Character: Dull ache    Intensity: moderate    Progression of Symptoms: same    Accompanying Signs & Symptoms: woke up with a franca horse this morning that took an hour to subside. Getting up a couple times a night to urinate- full bladder. No problems with weak stream or dribbling. Not drinking caffeine before bed. Sometimes has an alcohol drink. Does not snore or have witnessed apnea.   Other symptoms: weakness-exhaustion, muscle spasms    History:   Previous similar pain: YES- here and there-resolves      Precipitating factors:   Trauma or overuse: no     Alleviating factors:  Improved by: tylenol arthritis, icy hot-mild relief.    Therapies Tried and outcome: listed above        PROBLEMS TO ADD ON...  Diabetes Follow-up      Patient is checking blood sugars: not at all    Diabetic concerns: None     Symptoms of hypoglycemia (low blood sugar): n/a     Paresthesias (numbness or burning in feet) or sores: Yes - have bumps on bottom of feet that come and go.    Diabetes Management Resources    Hyperlipidemia Follow-Up      Rate your low fat/cholesterol diet?: not monitoring fat    Taking statin?  No    Other lipid medications/supplements?:  none    Hypertension Follow-up      Outpatient blood pressures are not being checked. Stopped taking Lisinopril due to cough.    Low Salt Diet: not monitoring salt    BP  Readings from Last 2 Encounters:   09/13/18 124/78   07/24/18 (!) 132/93     Hemoglobin A1C (%)   Date Value   09/13/2018 5.7 (H)   05/08/2018 7.1 (H)     LDL Cholesterol Calculated (mg/dL)   Date Value   04/13/2018     Cannot estimate LDL when triglyceride exceeds 400 mg/dL   07/20/2017 138 (H)     LDL Cholesterol Direct (mg/dL)   Date Value   04/13/2018 97       Problem list and histories reviewed & adjusted, as indicated.  Additional history: as documented    Patient Active Problem List   Diagnosis     GERD (gastroesophageal reflux disease)     Generalized anxiety disorder     Fatty liver     Family history of coronary artery disease     Hypertriglyceridemia     Vitamin D deficiency     Chest pain     Chronic ischemic heart disease     Hyperlipidemia LDL goal <70     Type 2 diabetes mellitus without complications (H)     Mild major depression (H)     overweight BMI greater than 35     Essential hypertension, benign     Past Surgical History:   Procedure Laterality Date     NO HISTORY OF SURGERY         Social History   Substance Use Topics     Smoking status: Never Smoker     Smokeless tobacco: Former User     Alcohol use Yes      Comment: 24 pack a month, intermittent drinks based off the shift (1st, 2nd or 3rd) he's working that week.     Family History   Problem Relation Age of Onset     C.A.D. Father      heart attack 30's     Cerebrovascular Disease Father      Diabetes Father      Hypertension Father      Hypertension Mother      GASTROINTESTINAL DISEASE Mother      colitis     Diabetes Maternal Grandmother      snores     Dementia Maternal Grandmother      C.A.D. Maternal Grandfather      MI, leg circulation problems     C.A.D. Paternal Grandfather      MI in 30s, snores     Neurologic Disorder Sister      vertigo.  fibromyalgia     Hyperlipidemia Daughter      Breast Cancer No family hx of      Cancer - colorectal No family hx of      Prostate Cancer No family hx of            Reviewed and updated as  "needed this visit by clinical staff  Tobacco  Allergies  Meds  Med Hx  Surg Hx  Fam Hx  Soc Hx      Reviewed and updated as needed this visit by Provider         ROS:  Constitutional, HEENT, cardiovascular, pulmonary, gi and gu systems are negative, except as otherwise noted.    OBJECTIVE:     /78 (BP Location: Right arm, Patient Position: Sitting, Cuff Size: Adult Large)  Pulse 72  Temp 96.8  F (36  C) (Tympanic)  Resp 20  Ht 5' 11\" (1.803 m)  Wt 245 lb 3.2 oz (111.2 kg)  SpO2 97%  BMI 34.2 kg/m2  Body mass index is 34.2 kg/(m^2).  GENERAL: healthy, alert and no distress  RESP: lungs clear to auscultation - no rales, rhonchi or wheezes  CV: regular rate and rhythm, normal S1 S2, no S3 or S4, no murmur, click or rub, no peripheral edema and peripheral pulses strong  ABDOMEN: tenderness epigastric and no organomegaly or masses  MS: no gross musculoskeletal defects noted, no edema  SKIN: firm 5 mm lesions- 2 side by side to bottom of right mid- foot consistent with plantar wart.  NEURO: Normal strength and tone, mentation intact and speech normal    Diagnostic Test Results:  Results for orders placed or performed in visit on 09/13/18 (from the past 24 hour(s))   ESR: Erythrocyte sedimentation rate   Result Value Ref Range    Sed Rate 8 0 - 15 mm/h   Hemoglobin A1c   Result Value Ref Range    Hemoglobin A1C 5.7 (H) 0 - 5.6 %       ASSESSMENT/PLAN:       ICD-10-CM    1. Gastroesophageal reflux disease, esophagitis presence not specified K21.9 GASTROENTEROLOGY ADULT REF PROCEDURE ONLY [facility] [phone]     pantoprazole (PROTONIX) 40 MG EC tablet   2. Type 2 diabetes mellitus without complication, without long-term current use of insulin (H) E11.9 losartan (COZAAR) 25 MG tablet     Hemoglobin A1c   3. Essential hypertension, benign I10 losartan (COZAAR) 25 MG tablet   4. Pain in joint, multiple sites M25.50 TSH with free T4 reflex     ESR: Erythrocyte sedimentation rate     CRP, inflammation "     Rheumatoid factor     Lyme Disease Rosa with reflex to WB Serum       FURTHER TESTING:       - Upper GI    FUTURE APPOINTMENTS:       - Labs pending, see PCP in 6 months.     Patient Instructions       Tips to Control Acid Reflux    To control acid reflux, you ll need to make some basic diet and lifestyle changes. The simple steps outlined below may be all you ll need to ease discomfort.  Watch what you eat    Avoid fatty foods and spicy foods.    Eat fewer acidic foods, such as citrus and tomato-based foods. These can increase symptoms.    Limit drinking alcohol, caffeine, and fizzy beverages. All increase acid reflux.    Try limiting chocolate, peppermint, and spearmint. These can worsen acid reflux in some people.  Watch when you eat    Avoid lying down for 3 hours after eating.    Do not snack before going to bed.  Raise your head  Raising your head and upper body by 4 to 6 inches helps limit reflux when you re lying down. Put blocks under the head of your bed frame to raise it.  Other changes    Lose weight, if you need to    Don t exercise near bedtime    Avoid tight-fitting clothes    Limit aspirin and ibuprofen    Stop smoking   Date Last Reviewed: 7/1/2016 2000-2017 [employer]. [address] [place] [address]. All rights reserved. This information is not intended as a substitute for professional medical care. Always follow your healthcare professional's instructions.            MICHELLE Asencio [facility]

## 2018-09-13 NOTE — MR AVS SNAPSHOT
After Visit Summary   9/13/2018    Phi Ortiz Jr.    MRN: [id number]           Patient Information     Date Of Birth          1970        Visit Information        Provider Department      9/13/2018 8:40 AM Peyton Mustafa APRN [facility]        Today's Diagnoses     Gastroesophageal reflux disease, esophagitis presence not specified    -  1    Type 2 diabetes mellitus without complication, without long-term current use of insulin (H)        Essential hypertension, benign        Pain in joint, multiple sites          Care Instructions      Tips to Control Acid Reflux    To control acid reflux, you ll need to make some basic diet and lifestyle changes. The simple steps outlined below may be all you ll need to ease discomfort.  Watch what you eat    Avoid fatty foods and spicy foods.    Eat fewer acidic foods, such as citrus and tomato-based foods. These can increase symptoms.    Limit drinking alcohol, caffeine, and fizzy beverages. All increase acid reflux.    Try limiting chocolate, peppermint, and spearmint. These can worsen acid reflux in some people.  Watch when you eat    Avoid lying down for 3 hours after eating.    Do not snack before going to bed.  Raise your head  Raising your head and upper body by 4 to 6 inches helps limit reflux when you re lying down. Put blocks under the head of your bed frame to raise it.  Other changes    Lose weight, if you need to    Don t exercise near bedtime    Avoid tight-fitting clothes    Limit aspirin and ibuprofen    Stop smoking   Date Last Reviewed: 7/1/2016 2000-2017 [employer]. [address] [place] [address], [place] [address]. All rights reserved. This information is not intended as a substitute for professional medical care. Always follow your healthcare professional's instructions.                Follow-ups after your visit        Additional Services     GASTROENTEROLOGY ADULT REF PROCEDURE ONLY [facility] [phone]       Last Lab Result: Creatinine (mg/dL)       Date                     Value                 07/24/2018               0.75             ----------  Body mass index is 34.2 kg/(m^2).     Needed:  No  Language:  English    Patient will be contacted to schedule procedure.     Please be aware that coverage of these services is subject to the terms and limitations of your health insurance plan.  Call member services at your health plan with any benefit or coverage questions.  Any procedures must be performed at a [facility] facility OR coordinated by your clinic's referral office.    Please bring the following with you to your appointment:    (1) Any X-Rays, CTs or MRIs which have been performed.  Contact the facility where they were done to arrange for  prior to your scheduled appointment.    (2) List of current medications   (3) This referral request   (4) Any documents/labs given to you for this referral                  Who to contact     If you have questions or need follow up information about today's clinic visit or your schedule please contact [facility] directly at [phone].  Normal or non-critical lab and imaging results will be communicated to you by MyChart, letter or phone within 4 business days after the clinic has received the results. If you do not hear from us within 7 days, please contact the clinic through MyChart or phone. If you have a critical or abnormal lab result, we will notify you by phone as soon as possible.  Submit refill requests through [employer] or call your pharmacy and they will forward the refill request to us. Please allow 3 business days for your refill to be completed.          Additional Information About Your Visit        Care EveryWhere ID     This is your Care EveryWhere ID. This could be used by other organizations to access your [facility] medical records  [id number]        Your Vitals Were     Pulse Temperature  "Respirations Height Pulse Oximetry BMI (Body Mass Index)    72 96.8  F (36  C) (Tympanic) 20 5' 11\" (1.803 m) 97% 34.2 kg/m2       Blood Pressure from Last 3 Encounters:   09/13/18 124/78   07/24/18 (!) 132/93   05/08/18 125/80    Weight from Last 3 Encounters:   09/13/18 245 lb 3.2 oz (111.2 kg)   05/08/18 256 lb (116.1 kg)   04/13/18 249 lb (112.9 kg)              We Performed the Following     CRP, inflammation     ESR: Erythrocyte sedimentation rate     GASTROENTEROLOGY ADULT REF PROCEDURE ONLY [facility] [phone]     Hemoglobin A1c     Lyme Disease Rosa with reflex to WB Serum     Rheumatoid factor     TSH with free T4 reflex          Today's Medication Changes          These changes are accurate as of 9/13/18  9:09 AM.  If you have any questions, ask your nurse or doctor.               Start taking these medicines.        Dose/Directions    losartan 25 MG tablet   Commonly known as:  COZAAR   Used for:  Type 2 diabetes mellitus without complication, without long-term current use of insulin (H), Essential hypertension, benign   Started by:  Peyton Mustafa APRN CNP        Dose:  12.5 mg   Take 0.5 tablets (12.5 mg) by mouth daily   Quantity:  45 tablet   Refills:  1         Stop taking these medicines if you haven't already. Please contact your care team if you have questions.     lisinopril 10 MG tablet   Commonly known as:  PRINIVIL/ZESTRIL   Stopped by:  Peyton Mustafa APRN CNP                Where to get your medicines      These medications were sent to [facility] [place], [place] - [address]  [address] [facility] [address]     Phone:  [phone]     losartan 25 MG tablet    pantoprazole 40 MG EC tablet                Primary Care Provider Office Phone # Fax #    MICHELLE Garcia [phone]       [address] [facility] [address]        Equal Access to Services     TOMASA CAMPOS AH: Chandu elizabeth " mark Modi, faizanda lugigiadaha, qaeverettta kafrantz stacy, cristal chinyerein hayaan harshchristopher cydylan labillcarmen dillon. So [facility] [phone].    ATENCIÓN: Si habla español, tiene a king disposición servicios gratuitos de asistencia lingüística. Edson al [phone].    We comply with applicable federal civil rights laws and [place] laws. We do not discriminate on the basis of race, color, national origin, age, disability, sex, sexual orientation, or gender identity.            Thank you!     Thank you for choosing [facility]  for your care. Our goal is always to provide you with excellent care. Hearing back from our patients is one way we can continue to improve our services. Please take a few minutes to complete the written survey that you may receive in the mail after your visit with us. Thank you!             Your Updated Medication List - Protect others around you: Learn how to safely use, store and throw away your medicines at www.disposemymeds.org.          This list is accurate as of 9/13/18  9:09 AM.  Always use your most recent med list.                   Brand Name Dispense Instructions for use Diagnosis    albuterol 108 (90 Base) MCG/ACT inhaler    PROAIR HFA/PROVENTIL HFA/VENTOLIN HFA    1 Inhaler    Inhale 2 puffs into the lungs every 6 hours as needed for shortness of breath / dyspnea or wheezing    Chronic cough       aspirin 81 MG EC tablet     360 tablet    Take 4 tablets (325 mg) by mouth daily    Type 2 diabetes mellitus without complication, without long-term current use of insulin (H)       blood glucose lancing device     30 each    Use to test blood sugars 1 times daily or as directed.    Type 2 diabetes mellitus without complications (H)       blood glucose monitoring meter device kit     1 kit    Use to test blood sugars 1 times daily or as directed.    Type 2 diabetes mellitus without complications (H)       blood glucose monitoring test strip    ONETOUCH ULTRA    1 Box    Use to test  blood sugars 1 times daily or as directed.    Type 2 diabetes mellitus without complications (H)       fluticasone 50 MCG/ACT spray    FLONASE    1 Bottle    Spray 1-2 sprays into both nostrils daily    Chronic rhinitis, unspecified type       losartan 25 MG tablet    COZAAR    45 tablet    Take 0.5 tablets (12.5 mg) by mouth daily    Type 2 diabetes mellitus without complication, without long-term current use of insulin (H), Essential hypertension, benign       metFORMIN 500 MG 24 hr tablet    GLUCOPHAGE-XR    90 tablet    Take 1 tablet (500 mg) by mouth every evening    Type 2 diabetes mellitus without complication, without long-term current use of insulin (H)       nitroGLYcerin 0.4 MG sublingual tablet    NITROSTAT    25 tablet    Place 1 tablet (0.4 mg) under the tongue every 5 minutes as needed for chest pain (CALL 911 IF NOT IMPROVED AFTER THREE CONSECUTIVE DOSES)        order for DME      Phi was set up on [employer]  Type [employer] Auto (System One 60 series)  Serial Number: [id number] Modem Number: [id number] Pressure: AUTO-TITRATE CPAP 7-12 cm H20 Mask of choice: AIRFIT F 10 FULL FACE MASK Size: MEDIUM HEATED TUBING AND MODEM PROVIDED        pantoprazole 40 MG EC tablet    PROTONIX    90 tablet    TAKE 1 TABLET BY MOUTH DAILY, 30 TO 60 MINUTES BEFORE A MEAL.    Gastroesophageal reflux disease, esophagitis presence not specified       rosuvastatin 40 MG tablet    CRESTOR    90 tablet    Take 1 tablet (40 mg) by mouth daily    Hyperlipidemia LDL goal <100

## 2018-09-13 NOTE — PATIENT INSTRUCTIONS
Tips to Control Acid Reflux    To control acid reflux, you ll need to make some basic diet and lifestyle changes. The simple steps outlined below may be all you ll need to ease discomfort.  Watch what you eat    Avoid fatty foods and spicy foods.    Eat fewer acidic foods, such as citrus and tomato-based foods. These can increase symptoms.    Limit drinking alcohol, caffeine, and fizzy beverages. All increase acid reflux.    Try limiting chocolate, peppermint, and spearmint. These can worsen acid reflux in some people.  Watch when you eat    Avoid lying down for 3 hours after eating.    Do not snack before going to bed.  Raise your head  Raising your head and upper body by 4 to 6 inches helps limit reflux when you re lying down. Put blocks under the head of your bed frame to raise it.  Other changes    Lose weight, if you need to    Don t exercise near bedtime    Avoid tight-fitting clothes    Limit aspirin and ibuprofen    Stop smoking   Date Last Reviewed: 7/1/2016 2000-2017 [employer]. [address] [place] [address], [place] [address]. All rights reserved. This information is not intended as a substitute for professional medical care. Always follow your healthcare professional's instructions.

## 2018-09-13 NOTE — LETTER
September 14, 2018      Phi Ortiz Jr.  [address] [place] [facility] [address]        Dear ,    We are writing to inform you of your test results.    Here is a copy of your labs. The A1C is down to 5.7, which is great, keep taking the Metformin as prescribed. The thyroid function is normal. The Lyme's screening is negative. The inflammatory markers, along with the Rheumatoid arthritis test are normal. You can try taking your Crestor (cholesterol) medicine every other day and see if that helps with the aches. If you continue to have the joint aches please follow up in clinic for further work up. You can also try a joint supplement such as glucosamine and chondroitin, available over the counter, by the vitamins. Please follow up in clinic on your diabetes in 6 months. Please let us know if you have any questions.     Resulted Orders   TSH with free T4 reflex   Result Value Ref Range    TSH 1.76 0.40 - 4.00 mU/L   ESR: Erythrocyte sedimentation rate   Result Value Ref Range    Sed Rate 8 0 - 15 mm/h   CRP, inflammation   Result Value Ref Range    CRP Inflammation 5.6 0.0 - 8.0 mg/L   Rheumatoid factor   Result Value Ref Range    Rheumatoid Factor <20 <20 IU/mL   Lyme Disease Rosa with reflex to WB Serum   Result Value Ref Range    Lyme Disease Antibodies Serum 0.08 0.00 - 0.89      Comment:      Negative, Absence of detectable Borrelia burdorferi antibodies. A negative   result does not exclude the possibility of Borrelia burgdorferi infection. If   early Lyme disease is suspected, a second sample should be collected and   tested 2 to 4 weeks later.     Hemoglobin A1c   Result Value Ref Range    Hemoglobin A1C 5.7 (H) 0 - 5.6 %      Comment:      Normal <5.7% Prediabetes 5.7-6.4%  Diabetes 6.5% or higher - adopted from ADA   consensus guidelines.         If you have any questions or concerns, please call the clinic at the number listed above.       Sincerely,        MICHELLE Asencio  CNP

## 2018-09-13 NOTE — LETTER
[facility]  [address]  [facility] [address]  Phone: [phone]      9/14/2018     Phi Ortiz Jr.  [address]  [place] [facility] [address]      Dear Phi:    Thank you for allowing me to participate in your care. Your recent test results were reviewed and listed below.      Here is a copy of your labs. The A1C is down to 5.7, which is great, keep taking the Metformin as prescribed. The thyroid function is normal. The Lyme's screening is negative. The inflammatory markers, along with the Rheumatoid arthritis test are normal. You can try taking your Crestor (cholesterol) medicine every other day and see if that helps with the aches. If you continue to have the joint aches please follow up in clinic for further work up. You can also try a joint supplement such as glucosamine and chondroitin, available over the counter, by the vitamins. Please follow up in clinic on your diabetes in 6 months. Please let us know if you have any questions.       Your results are provided below for your review  Results for orders placed or performed in visit on 09/13/18   TSH with free T4 reflex   Result Value Ref Range    TSH 1.76 0.40 - 4.00 mU/L   ESR: Erythrocyte sedimentation rate   Result Value Ref Range    Sed Rate 8 0 - 15 mm/h   CRP, inflammation   Result Value Ref Range    CRP Inflammation 5.6 0.0 - 8.0 mg/L   Rheumatoid factor   Result Value Ref Range    Rheumatoid Factor <20 <20 IU/mL   Lyme Disease Rosa with reflex to WB Serum   Result Value Ref Range    Lyme Disease Antibodies Serum 0.08 0.00 - 0.89   Hemoglobin A1c   Result Value Ref Range    Hemoglobin A1C 5.7 (H) 0 - 5.6 %               Thanks,   MICHELLE Stout CNP

## 2018-09-14 LAB
B BURGDOR IGG+IGM SER QL: 0.08 (ref 0–0.89)
RHEUMATOID FACT SER NEPH-ACNC: <20 IU/ML (ref 0–20)

## 2018-09-14 NOTE — PROGRESS NOTES
Please send patient letter notifying of labs/imaging and include provider message.    Lui Yip,     Here is a copy of your labs. The A1C is down to 5.7, which is great, keep taking the Metformin as prescribed. The thyroid function is normal. The Lyme's screening is negative. The inflammatory markers, along with the Rheumatoid arthritis test are normal. You can try taking your Crestor (cholesterol) medicine every other day and see if that helps with the aches. If you continue to have the joint aches please follow up in clinic for further work up. You can also try a joint supplement such as glucosamine and chondroitin, available over the counter, by the vitamins. Please follow up in clinic on your diabetes in 6 months. Please let us know if you have any questions.      Thanks,  MICHELLE Stout CNP

## 2018-09-21 ENCOUNTER — TELEPHONE (OUTPATIENT)
Dept: PEDIATRICS | Facility: CLINIC | Age: 48
End: 2018-09-21

## 2018-09-21 NOTE — TELEPHONE ENCOUNTER
Reason for Call:  Other lab results     Detailed comments: patient called stating that he got his results of A1c. Patient wanted sosa to know that he has not been taking his metformin due to work schedule and remembering to take. He wants to know if he starts again would his drop his sugar levels as well.      Phone Number Patient can be reached at: Cell number on file:    Telephone Information:   Mobile [phone]       Best Time: anytime    Can we leave a detailed message on this number? YES    Call taken on 9/21/2018 at 9:14 AM by Lynda Cantu

## 2018-09-21 NOTE — TELEPHONE ENCOUNTER
Yes, when he comes in again, have him come in fasting and can recheck his lipids.  Mckenna Caldwell, FRANCISCONP

## 2018-09-21 NOTE — TELEPHONE ENCOUNTER
S-(situation): Patient was seen on 9/13/18 for diabetic check with Peyton.    B-(background): Patient has made diabetic changes with diet and exercise.    A-(assessment): Patient reports he has made changes and lost about 50 lbs. Patient also reports he is working a different shift and it is harder for him to take his medication. Patient reports he has to taken his metformin for at least 4 months, maybe even longer.     R-(recommendations): Should patient start his metformin or should he continue with the changes he made?  Will send to primary doctor.    Thank you  Ranjana NASSAR RN

## 2018-09-21 NOTE — TELEPHONE ENCOUNTER
He should be seen and do lab work to reassess where he is at with diabetes management and then make a plan going forth.  PRAMOD Gallardo

## 2018-09-21 NOTE — TELEPHONE ENCOUNTER
Patient forgot to ask about cholesterol.  His labs in 4/13/18.  Patient has not been taking this medication either.   Can patient just have fasting labs for lipids done sooner?    Lab Results   Component Value Date    CHOL 217 04/13/2018     Lab Results   Component Value Date    HDL 24 04/13/2018     Lab Results   Component Value Date    LDL  04/13/2018     Cannot estimate LDL when triglyceride exceeds 400 mg/dL    LDL 97 04/13/2018     Lab Results   Component Value Date    TRIG 600 04/13/2018     Lab Results   Component Value Date    CHOLHDLRATIO 7.4 08/12/2014       Thank you    Ranjana NASSAR RN

## 2018-09-21 NOTE — TELEPHONE ENCOUNTER
Discussed with provider again.  Ok for patient to continue with lifestyle changes.  Patient can continue with what he is doing.  She would like patient to follow up in 3 months to check labs again.  Patient agrees with plan.    Ranjana NASSAR RN

## 2018-10-02 ENCOUNTER — OFFICE VISIT (OUTPATIENT)
Dept: FAMILY MEDICINE | Facility: CLINIC | Age: 48
End: 2018-10-02
Payer: COMMERCIAL

## 2018-10-02 VITALS
HEIGHT: 71 IN | RESPIRATION RATE: 16 BRPM | WEIGHT: 245 LBS | HEART RATE: 74 BPM | BODY MASS INDEX: 34.3 KG/M2 | DIASTOLIC BLOOD PRESSURE: 70 MMHG | TEMPERATURE: 98.4 F | SYSTOLIC BLOOD PRESSURE: 130 MMHG | OXYGEN SATURATION: 99 %

## 2018-10-02 DIAGNOSIS — J01.90 ACUTE SINUSITIS WITH SYMPTOMS > 10 DAYS: Primary | ICD-10-CM

## 2018-10-02 DIAGNOSIS — E66.01 MORBID OBESITY DUE TO EXCESS CALORIES (H): ICD-10-CM

## 2018-10-02 DIAGNOSIS — F41.1 GENERALIZED ANXIETY DISORDER: Chronic | ICD-10-CM

## 2018-10-02 DIAGNOSIS — F32.0 MILD MAJOR DEPRESSION (H): Chronic | ICD-10-CM

## 2018-10-02 PROBLEM — N20.9 URINARY CALCULUS, UNSPECIFIED: Status: ACTIVE | Noted: 2018-10-02

## 2018-10-02 PROCEDURE — 99214 OFFICE O/P EST MOD 30 MIN: CPT | Performed by: NURSE PRACTITIONER

## 2018-10-02 NOTE — PATIENT INSTRUCTIONS
Take the antibiotic as prescribed.  Follow up in 2 months and will repeat diabetes test then.                    Sinusitis           What is sinusitis?   Sinusitis is swollen, infected linings of the sinuses. The sinuses are hollow spaces in the bones of your face and skull. They connect with the nose through small openings. Like the nose, their linings make mucus.   How does it occur?   Sinusitis occurs when the sinus linings become infected. The passageways from the sinuses to the nose are very narrow. Swelling and mucus may block the passageways. This leads to pressure changes in the sinuses that can be painful.   A number of things can cause swelling and sinusitis. Most often it's allergens (things that cause allergies, like pollen and mold) and viruses, such as viruses that cause the common cold. Whether the cause is allergies or a virus, the sinus linings can swell. When swelling causes the sinus passageway to swell shut, bacteria, viruses, and even fungus can be trapped in the sinuses and cause a sinus infection.   If your nasal bones have been injured or are deformed, causing partial blockage of the sinus openings, you are more likely to get sinusitis.   What are the symptoms?   Symptoms include:   feeling of fullness or pressure in your head   a headache that is most painful when you first wake up in the morning or when you bend your head down or forward   pain above or below your eyes   aching in the upper jaw and teeth   runny or stuffy nose   cough, especially at night   fluid draining down the back of your throat (postnasal drainage)   sore throat in the morning or evening.   How is it diagnosed?   Your healthcare provider will ask about your symptoms and will examine you. You may have an X-ray to look for swelling, fluid, or small benign growths (polyps) in the sinuses.   How is it treated?   Decongestants may help. They may be nonprescription or prescription. They are available as liquids, pills, and  nose sprays.   Your healthcare provider may prescribe an antibiotic. In some cases you may need to take decongestants and antibiotics for several weeks.   You may need nonprescription medicine for pain, such as acetaminophen or ibuprofen. Check with your healthcare provider before you give any medicine that contains aspirin or salicylates to a child or teen. This includes medicines like baby aspirin, some cold medicines, and Pepto Bismol. Children and teens who take aspirin are at risk for a serious illness called Reye's syndrome. Ibuprofen is an NSAID. Nonsteroidal anti-inflammatory medicines (NSAIDs) may cause stomach bleeding and other problems. These risks increase with age. Read the label and take as directed. Unless recommended by your healthcare provider, do not take NSAIDs for more than 10 days for any reason.   If you have chronic or repeated sinus infections, allergies may be the cause. Your healthcare provider may prescribe antihistamine tablets or prescription nasal sprays (steroids or cromolyn) to treat the allergies.   If you have chronic, severe sinusitis that does not respond to treatment with medicines, surgery may be done. The surgeon can create an extra or enlarged passageway in the wall of the sinus cavity. This allows the sinuses to drain more easily through the nasal passages. This should help them stay free of infection.   How long will the effects last?   Symptoms may get better gradually over 3 to 10 days. Depending on what caused the sinusitis and how severe it is, it may last for days or weeks. The symptoms may come back if you do not finish all of your antibiotic.   How can I take care of myself?   Follow your healthcare provider's instructions.   If you are taking an antibiotic, take all of it as directed by your provider. If you stop taking the medicine when your symptoms are gone but before you have taken all of the medicine, symptoms may come back.   Avoid tobacco smoke.   If you have  allergies, take care to avoid the things you are allergic to, such as animal dander.   Add moisture to the air with a humidifier or a vaporizer, unless you have mold allergy (mold may grow in your vaporizer).   Inhale steam from a basin of hot water or shower to help open your sinuses and relieve pain.   Use saline nasal sprays to help wash out nasal passages and clear some mucus from the airways.   Use decongestants as directed on the label or by your provider.   If you are using a nonprescription nasal-spray decongestant, generally you should not use it for more than 3 days. After 3 days it may cause your symptoms to get worse. Ask your healthcare provider if it is OK for you to use a nasal spray decongestant longer than this.   Get plenty of rest.   Drink more fluids to keep the mucus as thin as possible so your sinuses can drain more easily.   Put warm compresses on painful areas.   Take antibiotics as prescribed. Use all of the medicine, even after you feel better. Some sinus infections require 2 to 4 weeks of antibiotic treatment.   See your healthcare provider if the pain lasts for several days or gets worse.   If the sinus areas above or below your eyes are swollen or bulging, see your healthcare provider right away. This symptom may mean that the infection is spreading. A spreading infection can affect other parts of your body--even the brain--and needs to be treated promptly.   How can I help prevent sinusitis?   Treat your colds and allergies promptly. Use decongestants as soon as you start having symptoms.   Do not smoke and stay away from secondhand smoke.   Drink lots of fluids to keep the mucus thin.   Humidify your home if the air is particularly dry.   If you have sinus infections often, consider having allergy tests.   If sinusitis continues to be a problem despite treatment, you might need an exam by an ear, nose, and throat doctor (called an ENT or otolaryngologist). The specialist will check for  polyps or a deformed bone that may be blocking your sinuses.     Published by [employer].  This content is reviewed periodically and is subject to change as new health information becomes available. The information is intended to inform and educate and is not a replacement for medical evaluation, advice, diagnosis or treatment by a healthcare professional.   Developed by [employer].   ? 2010 [facility] and/or its affiliates. All Rights Reserved.   Copyright   Clinical Reference Systems 2011  Adult Health Advisor          Thank you for choosing [facility].  You may be receiving a survey in the mail from [facility] regarding your visit today.  Please take a few minutes to complete and return the survey to let us know how we are doing.      Our Clinic hours are:  Mondays    7:20 am - 7 pm  Tues -  Fri  7:20 am - 5 pm    Clinic Phone: [phone]    The clinic lab opens at 7:30 am Mon - Fri and appointments are required.    [facility] [place]  Ph. [phone]  Monday  8 am - 7pm  Tues - Fri 8 am - 5:30 pm

## 2018-10-02 NOTE — PROGRESS NOTES
SUBJECTIVE:   Phi Ortiz Jr. is a 48 year old male who presents to clinic today for the following health issues:  not on the metformin, he was told he did not need it.    ENT Symptoms             Symptoms: cc Present Absent Comment   Fever/Chills  x     Fatigue  x     Muscle Aches  x     Eye Irritation  x  Hazy    Sneezing   x    Nasal Frandy/Drg x x     Sinus Pressure/Pain  x     Loss of smell  x     Dental pain   x Bad taste   Sore Throat   x    Swollen Glands  x     Ear Pain/Fullness  x     Cough  x     Wheeze  x     Chest Pain   x    Shortness of breath  x     Rash   x    Other         Symptom duration:  4 days   Symptom severity:  mod   Treatments tried:  DayQuil and Flonase   Contacts:  lots of people             Problem list and histories reviewed & adjusted, as indicated.  Additional history: reports over a week or so of URI symptoms but they have worsened over the past 4 days. He's missed work and needs a note.  He states he stopped the metformin, his last A1c was 5.7. He has been successful with about 10 pound weight loss and has changed his diet to avoid sugar.  He states he's feeling well in those regards.   Will get flu shot at work.    Patient Active Problem List   Diagnosis     GERD (gastroesophageal reflux disease)     Generalized anxiety disorder     Fatty liver     Family history of coronary artery disease     Hypertriglyceridemia     Vitamin D deficiency     Chest pain     Chronic ischemic heart disease     Hyperlipidemia LDL goal <70     Type 2 diabetes mellitus without complications (H)     Mild major depression (H)     overweight BMI greater than 35     Essential hypertension, benign     Past Surgical History:   Procedure Laterality Date     NO HISTORY OF SURGERY         Social History   Substance Use Topics     Smoking status: Never Smoker     Smokeless tobacco: Former User     Alcohol use Yes      Comment: 24 pack a month, intermittent drinks based off the shift (1st, 2nd or 3rd) he's  working that week.     Family History   Problem Relation Age of Onset     C.A.D. Father      heart attack 30's     Cerebrovascular Disease Father      Diabetes Father      Hypertension Father      Hypertension Mother      GASTROINTESTINAL DISEASE Mother      colitis     Diabetes Maternal Grandmother      snores     Dementia Maternal Grandmother      C.A.D. Maternal Grandfather      MI, leg circulation problems     C.A.D. Paternal Grandfather      MI in 30s, snores     Neurologic Disorder Sister      vertigo.  fibromyalgia     Hyperlipidemia Daughter      Breast Cancer No family hx of      Cancer - colorectal No family hx of      Prostate Cancer No family hx of          Current Outpatient Prescriptions   Medication Sig Dispense Refill     albuterol (PROAIR HFA/PROVENTIL HFA/VENTOLIN HFA) 108 (90 Base) MCG/ACT Inhaler Inhale 2 puffs into the lungs every 6 hours as needed for shortness of breath / dyspnea or wheezing 1 Inhaler 0     amoxicillin-clavulanate (AUGMENTIN) 875-125 MG per tablet Take 1 tablet by mouth 2 times daily 20 tablet 0     aspirin EC 81 MG EC tablet Take 4 tablets (325 mg) by mouth daily 360 tablet 3     fluticasone (FLONASE) 50 MCG/ACT spray Spray 1-2 sprays into both nostrils daily 1 Bottle 1     losartan (COZAAR) 25 MG tablet Take 0.5 tablets (12.5 mg) by mouth daily 45 tablet 1     nitroGLYcerin (NITROSTAT) 0.4 MG sublingual tablet Place 1 tablet (0.4 mg) under the tongue every 5 minutes as needed for chest pain (CALL 911 IF NOT IMPROVED AFTER THREE CONSECUTIVE DOSES) 25 tablet 0     rosuvastatin (CRESTOR) 40 MG tablet Take 1 tablet (40 mg) by mouth daily 90 tablet 3     blood glucose (ONE TOUCH DELICA) lancing device Use to test blood sugars 1 times daily or as directed. (Patient not taking: Reported on 9/13/2018) 30 each 6     blood glucose monitoring (ONE TOUCH ULTRA 2) meter device kit Use to test blood sugars 1 times daily or as directed. (Patient not taking: Reported on 9/13/2018) 1 kit 0      blood glucose monitoring (ONE TOUCH ULTRA) test strip Use to test blood sugars 1 times daily or as directed. (Patient not taking: Reported on 9/13/2018) 1 Box 6     metFORMIN (GLUCOPHAGE-XR) 500 MG 24 hr tablet Take 1 tablet (500 mg) by mouth every evening (Patient not taking: Reported on 10/2/2018) 90 tablet 1     ORDER FOR DME, SET TO FAX, Phi was set up on [employer]  Type [employer] Auto (System One 60 series)  Serial Number: [id number] Modem Number: [id number] Pressure: AUTO-TITRATE CPAP 7-12 cm H20 Mask of choice: AIRFIT F 10 FULL FACE MASK Size: MEDIUM HEATED TUBING AND MODEM PROVIDED       pantoprazole (PROTONIX) 40 MG EC tablet TAKE 1 TABLET BY MOUTH DAILY, 30 TO 60 MINUTES BEFORE A MEAL. (Patient not taking: Reported on 10/2/2018) 90 tablet 3     No Known Allergies  Recent Labs   Lab Test  09/13/18   0914  07/24/18   1345  05/08/18   0951  04/13/18   1000  04/11/18   1145  12/11/17   0935  07/20/17   1004   06/02/17   1159   03/03/16   1419   A1C  5.7*   --   7.1*   --    --    --    --    --   7.0*   < >  7.9*   LDL   --    --    --   Cannot estimate LDL when triglyceride exceeds 400 mg/dL  97   --    --   138*   --    --    --   85   HDL   --    --    --   24*   --    --   37*   --    --    --   32*   TRIG   --    --    --   600*   --    --   225*   --    --    --   247*   ALT   --   107*   --    --   50  35   --    < >   --    < >   --    CR   --   0.75   --    --   0.69  0.63*   --    < >   --    < >  0.61*   GFRESTIMATED   --   >90   --    --   >90  >90   --    < >   --    < >  >90  Non  GFR Calc     GFRESTBLACK   --   >90   --    --   >90  >90   --    < >   --    < >  >90   GFR Calc     POTASSIUM   --   4.0   --    --   4.2  4.3   --    < >   --    < >  4.0   TSH  1.76   --    --    --   1.61   --    --    --    --    < >   --     < > = values in this interval not displayed.      BP Readings from Last 3 Encounters:   10/02/18 130/70   09/13/18 124/78  "  07/24/18 (!) 132/93    Wt Readings from Last 3 Encounters:   10/02/18 245 lb (111.1 kg)   09/13/18 245 lb 3.2 oz (111.2 kg)   05/08/18 256 lb (116.1 kg)                    Reviewed and updated as needed this visit by clinical staff  Tobacco  Allergies  Meds  Med Hx  Surg Hx  Fam Hx  Soc Hx      Reviewed and updated as needed this visit by Provider         10 point ROS of systems including Constitutional, Eyes, Respiratory, Cardiovascular, Gastroenterology, Genitourinary, Integumentary, Muscularskeletal, Psychiatric were all negative except for pertinent positives noted in my HPI.    OBJECTIVE:     /70 (BP Location: Right arm, Patient Position: Chair, Cuff Size: Adult Large)  Pulse 74  Temp 98.4  F (36.9  C) (Oral)  Resp 16  Ht 5' 11\" (1.803 m)  Wt 245 lb (111.1 kg)  SpO2 99%  BMI 34.17 kg/m2  Body mass index is 34.17 kg/(m^2).  GENERAL: healthy, alert and no distress  HENT: ear canals and TM's normal, pharynx with mild erythema, pan sinus tenderness  NECK: tonsillar adenopathy, no asymmetry  RESP: lungs clear to auscultation - no rales, rhonchi or wheezes  CV: regular rate and rhythm, normal S1 S2, no S3 or S4, no murmur  ABDOMEN: soft, obese  MS: no gross musculoskeletal defects noted      Diagnostic Test Results:  No results found for this or any previous visit (from the past 24 hour(s)).    ASSESSMENT/PLAN:             1. Acute sinusitis with symptoms > 10 days    - amoxicillin-clavulanate (AUGMENTIN) 875-125 MG per tablet; Take 1 tablet by mouth 2 times daily  Dispense: 20 tablet; Refill: 0  Discussed how to take the medication(s), expected outcomes, potential side effects.  Note written for missed work.    2. Morbid obesity due to excess calories (H)    Continue with all efforts at weight loss.    3. Mild major depression (H)    Scored high on PHQ today, will repeat when he returns, he reports he marked those because he's presently irritated because he's not feeling well.    4. Generalized " anxiety disorder        See Patient Instructions    Patient Instructions   Take the antibiotic as prescribed.  Follow up in 2 months and will repeat diabetes test then.                    Sinusitis           What is sinusitis?   Sinusitis is swollen, infected linings of the sinuses. The sinuses are hollow spaces in the bones of your face and skull. They connect with the nose through small openings. Like the nose, their linings make mucus.   How does it occur?   Sinusitis occurs when the sinus linings become infected. The passageways from the sinuses to the nose are very narrow. Swelling and mucus may block the passageways. This leads to pressure changes in the sinuses that can be painful.   A number of things can cause swelling and sinusitis. Most often it's allergens (things that cause allergies, like pollen and mold) and viruses, such as viruses that cause the common cold. Whether the cause is allergies or a virus, the sinus linings can swell. When swelling causes the sinus passageway to swell shut, bacteria, viruses, and even fungus can be trapped in the sinuses and cause a sinus infection.   If your nasal bones have been injured or are deformed, causing partial blockage of the sinus openings, you are more likely to get sinusitis.   What are the symptoms?   Symptoms include:   feeling of fullness or pressure in your head   a headache that is most painful when you first wake up in the morning or when you bend your head down or forward   pain above or below your eyes   aching in the upper jaw and teeth   runny or stuffy nose   cough, especially at night   fluid draining down the back of your throat (postnasal drainage)   sore throat in the morning or evening.   How is it diagnosed?   Your healthcare provider will ask about your symptoms and will examine you. You may have an X-ray to look for swelling, fluid, or small benign growths (polyps) in the sinuses.   How is it treated?   Decongestants may help. They may be  nonprescription or prescription. They are available as liquids, pills, and nose sprays.   Your healthcare provider may prescribe an antibiotic. In some cases you may need to take decongestants and antibiotics for several weeks.   You may need nonprescription medicine for pain, such as acetaminophen or ibuprofen. Check with your healthcare provider before you give any medicine that contains aspirin or salicylates to a child or teen. This includes medicines like baby aspirin, some cold medicines, and Pepto Bismol. Children and teens who take aspirin are at risk for a serious illness called Reye's syndrome. Ibuprofen is an NSAID. Nonsteroidal anti-inflammatory medicines (NSAIDs) may cause stomach bleeding and other problems. These risks increase with age. Read the label and take as directed. Unless recommended by your healthcare provider, do not take NSAIDs for more than 10 days for any reason.   If you have chronic or repeated sinus infections, allergies may be the cause. Your healthcare provider may prescribe antihistamine tablets or prescription nasal sprays (steroids or cromolyn) to treat the allergies.   If you have chronic, severe sinusitis that does not respond to treatment with medicines, surgery may be done. The surgeon can create an extra or enlarged passageway in the wall of the sinus cavity. This allows the sinuses to drain more easily through the nasal passages. This should help them stay free of infection.   How long will the effects last?   Symptoms may get better gradually over 3 to 10 days. Depending on what caused the sinusitis and how severe it is, it may last for days or weeks. The symptoms may come back if you do not finish all of your antibiotic.   How can I take care of myself?   Follow your healthcare provider's instructions.   If you are taking an antibiotic, take all of it as directed by your provider. If you stop taking the medicine when your symptoms are gone but before you have taken all of  the medicine, symptoms may come back.   Avoid tobacco smoke.   If you have allergies, take care to avoid the things you are allergic to, such as animal dander.   Add moisture to the air with a humidifier or a vaporizer, unless you have mold allergy (mold may grow in your vaporizer).   Inhale steam from a basin of hot water or shower to help open your sinuses and relieve pain.   Use saline nasal sprays to help wash out nasal passages and clear some mucus from the airways.   Use decongestants as directed on the label or by your provider.   If you are using a nonprescription nasal-spray decongestant, generally you should not use it for more than 3 days. After 3 days it may cause your symptoms to get worse. Ask your healthcare provider if it is OK for you to use a nasal spray decongestant longer than this.   Get plenty of rest.   Drink more fluids to keep the mucus as thin as possible so your sinuses can drain more easily.   Put warm compresses on painful areas.   Take antibiotics as prescribed. Use all of the medicine, even after you feel better. Some sinus infections require 2 to 4 weeks of antibiotic treatment.   See your healthcare provider if the pain lasts for several days or gets worse.   If the sinus areas above or below your eyes are swollen or bulging, see your healthcare provider right away. This symptom may mean that the infection is spreading. A spreading infection can affect other parts of your body--even the brain--and needs to be treated promptly.   How can I help prevent sinusitis?   Treat your colds and allergies promptly. Use decongestants as soon as you start having symptoms.   Do not smoke and stay away from secondhand smoke.   Drink lots of fluids to keep the mucus thin.   Humidify your home if the air is particularly dry.   If you have sinus infections often, consider having allergy tests.   If sinusitis continues to be a problem despite treatment, you might need an exam by an ear, nose, and  throat doctor (called an ENT or otolaryngologist). The specialist will check for polyps or a deformed bone that may be blocking your sinuses.     Published by [employer].  This content is reviewed periodically and is subject to change as new health information becomes available. The information is intended to inform and educate and is not a replacement for medical evaluation, advice, diagnosis or treatment by a healthcare professional.   Developed by [employer].   ? 2010 [employer] and/or its affiliates. All Rights Reserved.   Copyright   Clinical Reference Systems 2011  Adult Health Advisor          Thank you for choosing [facility].  You may be receiving a survey in the mail from [employer] regarding your visit today.  Please take a few minutes to complete and return the survey to let us know how we are doing.      Our Clinic hours are:  Mondays    7:20 am - 7 pm  Tues -  Fri  7:20 am - 5 pm    Clinic Phone: [phone]    The clinic lab opens at 7:30 am Mon - Fri and appointments are required.    [facility] [place]  Ph. [phone]  Monday  8 am - 7pm  Tues - Fri 8 am - 5:30 pm             MICHELLE Garcia CNP  [facility]

## 2018-10-02 NOTE — LETTER
My Depression Action Plan  Name: Phi Ortiz Jr.   Date of Birth 1970  Date: 10/2/2018    My doctor: Mckenna Caldwell   My clinic: [facility]  [address]  [facility] [address]  [phone]          GREEN    ZONE   Good Control    What it looks like:     Things are going generally well. You have normal up s and down s. You may even feel depressed from time to time, but bad moods usually last less than a day.   What you need to do:  1. Continue to care for yourself (see self care plan)  2. Check your depression survival kit and update it as needed  3. Follow your physician s recommendations including any medication.  4. Do not stop taking medication unless you consult with your physician first.           YELLOW         ZONE Getting Worse    What it looks like:     Depression is starting to interfere with your life.     It may be hard to get out of bed; you may be starting to isolate yourself from others.    Symptoms of depression are starting to last most all day and this has happened for several days.     You may have suicidal thoughts but they are not constant.   What you need to do:     1. Call your care team, your response to treatment will improve if you keep your care team informed of your progress. Yellow periods are signs an adjustment may need to be made.     2. Continue your self-care, even if you have to fake it!    3. Talk to someone in your support network    4. Open up your depression survival kit           RED    ZONE Medical Alert - Get Help    What it looks like:     Depression is seriously interfering with your life.     You may experience these or other symptoms: You can t get out of bed most days, can t work or engage in other necessary activities, you have trouble taking care of basic hygiene, or basic responsibilities, thoughts of suicide or death that will not go away, self-injurious behavior.     What you need to do:  1. Call your care  team and request a same-day appointment. If they are not available (weekends or after hours) call your local crisis line, emergency room or 911.            Depression Self Care Plan / Survival Kit    Self-Care for Depression  Here s the deal. Your body and mind are really not as separate as most people think.  What you do and think affects how you feel and how you feel influences what you do and think. This means if you do things that people who feel good do, it will help you feel better.  Sometimes this is all it takes.  There is also a place for medication and therapy depending on how severe your depression is, so be sure to consult with your medical provider and/ or Behavioral Health Consultant if your symptoms are worsening or not improving.     In order to better manage my stress, I will:    Exercise  Get some form of exercise, every day. This will help reduce pain and release endorphins, the  feel good  chemicals in your brain. This is almost as good as taking antidepressants!  This is not the same as joining a gym and then never going! (they count on that by the way ) It can be as simple as just going for a walk or doing some gardening, anything that will get you moving.      Hygiene   Maintain good hygiene (Get out of bed in the morning, Make your bed, Brush your teeth, Take a shower, and Get dressed like you were going to work, even if you are unemployed).  If your clothes don't fit try to get ones that do.    Diet  I will strive to eat foods that are good for me, drink plenty of water, and avoid excessive sugar, caffeine, alcohol, and other mood-altering substances.  Some foods that are helpful in depression are: complex carbohydrates, B vitamins, flaxseed, fish or fish oil, fresh fruits and vegetables.    Psychotherapy  I agree to participate in Individual Therapy (if recommended).    Medication  If prescribed medications, I agree to take them.  Missing doses can result in serious side effects.  I  understand that drinking alcohol, or other illicit drug use, may cause potential side effects.  I will not stop my medication abruptly without first discussing it with my provider.    Staying Connected With Others  I will stay in touch with my friends, family members, and my primary care provider/team.    Use your imagination  Be creative.  We all have a creative side; it doesn t matter if it s oil painting, sand castles, or mud pies! This will also kick up the endorphins.    Witness Beauty  (AKA stop and smell the roses) Take a look outside, even in mid-winter. Notice colors, textures. Watch the squirrels and birds.     Service to others  Be of service to others.  There is always someone else in need.  By helping others we can  get out of ourselves  and remember the really important things.  This also provides opportunities for practicing all the other parts of the program.    Humor  Laugh and be silly!  Adjust your TV habits for less news and crime-drama and more comedy.    Control your stress  Try breathing deep, massage therapy, biofeedback, and meditation. Find time to relax each day.     My support system    Clinic Contact:  Phone number:    Contact 1:  Phone number:    Contact 2:  Phone number:    Restorationist/:  Phone number:    Therapist:  Phone number:    Local crisis center:    Phone number:    Other community support:  Phone number:

## 2018-10-02 NOTE — LETTER
[facility]  [address] [address]  Phone: [phone]    October 2, 2018        Phi Ortiz Jr.  [address]  [place] [address]          To whom it may concern:    RE: Phi Ortiz Jr.    Patient was seen and treated today at our clinic and missed work 10/1/18 through 10/4/18.    Please contact me for questions or concerns.      Sincerely,        MICHELLE Garcia CNP

## 2018-10-09 ENCOUNTER — OFFICE VISIT (OUTPATIENT)
Dept: FAMILY MEDICINE | Facility: CLINIC | Age: 48
End: 2018-10-09
Payer: COMMERCIAL

## 2018-10-09 VITALS
SYSTOLIC BLOOD PRESSURE: 138 MMHG | WEIGHT: 247 LBS | TEMPERATURE: 97.7 F | OXYGEN SATURATION: 97 % | HEIGHT: 71 IN | DIASTOLIC BLOOD PRESSURE: 88 MMHG | RESPIRATION RATE: 18 BRPM | BODY MASS INDEX: 34.58 KG/M2 | HEART RATE: 71 BPM

## 2018-10-09 DIAGNOSIS — J06.9 VIRAL URI: Primary | ICD-10-CM

## 2018-10-09 PROCEDURE — 99213 OFFICE O/P EST LOW 20 MIN: CPT | Performed by: FAMILY MEDICINE

## 2018-10-09 NOTE — PATIENT INSTRUCTIONS
Thank you for choosing [facility].  You may be receiving a survey in the mail from [facility] regarding your visit today.  Please take a few minutes to complete and return the survey to let us know how we are doing.      Our Clinic hours are:  Mondays    7:20 am - 7 pm  Tues - Fri  7:20 am - 5 pm    Clinic Phone: [phone]    The clinic lab opens at 7:30 am Mon - Fri and appointments are required.    [facility]. [phone]  Monday  8 am - 7pm  Tues - Fri 8 am - 5:30 pm

## 2018-10-09 NOTE — PROGRESS NOTES
"  SUBJECTIVE:   Phi Ortiz Jr. is a 48 year old male who presents to clinic today for the following health issues:      ENT Symptoms             Symptoms: cc Present Absent Comment   Fever/Chills   x    Fatigue  x     Muscle Aches  x     Eye Irritation   x    Sneezing   x    Nasal Frandy/Drg x x     Sinus Pressure/Pain x x     Loss of smell  x     Dental pain   x    Sore Throat  x     Swollen Glands  x     Ear Pain/Fullness  x  Itchy    Cough x x     Wheeze x x     Chest Pain x x     Shortness of breath x x     Rash       Other         Symptom duration:  7 days    Symptom severity:  cough will not go away    Treatments tried:  antibiotic    Contacts:  none             Problem list and histories reviewed & adjusted, as indicated.  Additional history:         Reviewed and updated as needed this visit by clinical staff  Tobacco  Allergies  Meds       Reviewed and updated as needed this visit by Provider      Further history obtained, clarified or corrected by physician:  He is finishing up a course of Augmentin for diagnosis of sinusitis.  He does not feel it helped much at all.  His sinuses have improved a little bit but now he is developed cough and burning sensation in his chest.  He has not had any fever.    OBJECTIVE:  /88  Pulse 71  Temp 97.7  F (36.5  C)  Resp 18  Ht 5' 11\" (1.803 m)  Wt 247 lb (112 kg)  SpO2 97%  BMI 34.45 kg/m2  HEAD: AT/NC  EYES: PERRLA, EOMI, Sclerae clear, Fundi normal with sharp discs  EARS: TMs clear, canals normal  NOSE & THROAT: clear  LUNGS: clear to auscultation, normal breath sounds  CV: RRR without murmur  ABD: BS+, soft, nontender, no masses, no hepatosplenomegaly     ASSESSMENT:  Viral URI    PLAN:  Finish out Augmentin  Symptomatic management  Return for worsening or new symptoms.    "

## 2018-10-09 NOTE — MR AVS SNAPSHOT
After Visit Summary   10/9/2018    Phi Ortiz Jr.    MRN: [id number]           Patient Information     Date Of Birth          1970        Visit Information        Provider Department      10/9/2018 9:20 AM Koby Galaviz MD [facility]        Today's Diagnoses     Viral URI    -  1      Care Instructions          Thank you for choosing [facility].  You may be receiving a survey in the mail from [employer] [facility] regarding your visit today.  Please take a few minutes to complete and return the survey to let us know how we are doing.      Our Clinic hours are:  Mondays    7:20 am - 7 pm  Tues -  Fri  7:20 am - 5 pm    Clinic Phone: [phone]    The clinic lab opens at 7:30 am Mon - Fri and appointments are required.    [facility]  Ph. [phone]  Monday  8 am - 7pm  Tues - Fri 8 am - 5:30 pm                 Follow-ups after your visit        Your next 10 appointments already scheduled     Nov 16, 2018   Procedure with Phi Tinoco MD   [facility] ([facility])    [address] [facility] [address]   [phone]           The medical center is located at [address]. (between [address] and [address] in [place], [place] north of [place]).            Dec 03, 2018  5:20 PM CST   SHORT with MICHELLE Garcia CNP   [facility]    [address] [address] [phone]              Who to contact     If you have questions or need follow up information about today's clinic visit or your schedule please contact [facility] directly at [phone].  Normal or non-critical lab and imaging results will be communicated to you by MyChart, letter or phone within 4 business days after the clinic has received the results. If you do not hear from us within 7 days, please contact the clinic through MyChart or phone. If you  "have a critical or abnormal lab result, we will notify you by phone as soon as possible.  Submit refill requests through [employer] or call your pharmacy and they will forward the refill request to us. Please allow 3 business days for your refill to be completed.          Additional Information About Your Visit        Care EveryWhere ID     This is your Care EveryWhere ID. This could be used by other organizations to access your [place] medical records  [id number]        Your Vitals Were     Pulse Temperature Respirations Height Pulse Oximetry BMI (Body Mass Index)    71 97.7  F (36.5  C) 18 5' 11\" (1.803 m) 97% 34.45 kg/m2       Blood Pressure from Last 3 Encounters:   10/09/18 138/88   10/02/18 130/70   09/13/18 124/78    Weight from Last 3 Encounters:   10/09/18 247 lb (112 kg)   10/02/18 245 lb (111.1 kg)   09/13/18 245 lb 3.2 oz (111.2 kg)              Today, you had the following     No orders found for display       Primary Care Provider Office Phone # Fax #    Mckenna India Caldwell, APRN [phone]       [address] [address]        Equal Access to Services     TOMASA CAMPOS : Hadii aad ku hadasho Soomaali, waaxda luqadaha, qaybta kaalmada adeegyada, cristal whitlockin haytanyan lucy busby . So [facility] [phone].    ATENCIÓN: Si habla español, tiene a king disposición servicios gratuitos de asistencia lingüística. Llame al [phone].    We comply with applicable federal civil rights laws and [place] laws. We do not discriminate on the basis of race, color, national origin, age, disability, sex, sexual orientation, or gender identity.            Thank you!     Thank you for choosing [facility]  for your care. Our goal is always to provide you with excellent care. Hearing back from our patients is one way we can continue to improve our services. Please take a few minutes to complete the written survey that you may receive in the mail after your visit " with us. Thank you!             Your Updated Medication List - Protect others around you: Learn how to safely use, store and throw away your medicines at www.disposemymeds.org.          This list is accurate as of 10/9/18  9:49 AM.  Always use your most recent med list.                   Brand Name Dispense Instructions for use Diagnosis    albuterol 108 (90 Base) MCG/ACT inhaler    PROAIR HFA/PROVENTIL HFA/VENTOLIN HFA    1 Inhaler    Inhale 2 puffs into the lungs every 6 hours as needed for shortness of breath / dyspnea or wheezing    Chronic cough       amoxicillin-clavulanate 875-125 MG per tablet    AUGMENTIN    20 tablet    Take 1 tablet by mouth 2 times daily    Acute sinusitis with symptoms > 10 days       aspirin 81 MG EC tablet     360 tablet    Take 4 tablets (325 mg) by mouth daily    Type 2 diabetes mellitus without complication, without long-term current use of insulin (H)       blood glucose lancing device     30 each    Use to test blood sugars 1 times daily or as directed.    Type 2 diabetes mellitus without complications (H)       blood glucose monitoring meter device kit     1 kit    Use to test blood sugars 1 times daily or as directed.    Type 2 diabetes mellitus without complications (H)       blood glucose monitoring test strip    ONETOUCH ULTRA    1 Box    Use to test blood sugars 1 times daily or as directed.    Type 2 diabetes mellitus without complications (H)       fluticasone 50 MCG/ACT spray    FLONASE    1 Bottle    Spray 1-2 sprays into both nostrils daily    Chronic rhinitis, unspecified type       losartan 25 MG tablet    COZAAR    45 tablet    Take 0.5 tablets (12.5 mg) by mouth daily    Type 2 diabetes mellitus without complication, without long-term current use of insulin (H), Essential hypertension, benign       metFORMIN 500 MG 24 hr tablet    GLUCOPHAGE-XR    90 tablet    Take 1 tablet (500 mg) by mouth every evening    Type 2 diabetes mellitus without complication, without  long-term current use of insulin (H)       nitroGLYcerin 0.4 MG sublingual tablet    NITROSTAT    25 tablet    Place 1 tablet (0.4 mg) under the tongue every 5 minutes as needed for chest pain (CALL 911 IF NOT IMPROVED AFTER THREE CONSECUTIVE DOSES)        order for DME      Phi was set up on [employer]  Type [employer] Auto (System One 60 series)  Serial Number: [id number] Modem Number: [id number] Pressure: AUTO-TITRATE CPAP 7-12 cm H20 Mask of choice: AIRFIT F 10 FULL FACE MASK Size: MEDIUM HEATED TUBING AND MODEM PROVIDED        pantoprazole 40 MG EC tablet    PROTONIX    90 tablet    TAKE 1 TABLET BY MOUTH DAILY, 30 TO 60 MINUTES BEFORE A MEAL.    Gastroesophageal reflux disease, esophagitis presence not specified       rosuvastatin 40 MG tablet    CRESTOR    90 tablet    Take 1 tablet (40 mg) by mouth daily    Hyperlipidemia LDL goal <100

## 2018-10-10 ASSESSMENT — PATIENT HEALTH QUESTIONNAIRE - PHQ9: SUM OF ALL RESPONSES TO PHQ QUESTIONS 1-9: 3

## 2018-10-17 ENCOUNTER — TELEPHONE (OUTPATIENT)
Dept: FAMILY MEDICINE | Facility: CLINIC | Age: 48
End: 2018-10-17

## 2018-10-17 DIAGNOSIS — N52.9 ERECTILE DYSFUNCTION, UNSPECIFIED ERECTILE DYSFUNCTION TYPE: Primary | ICD-10-CM

## 2018-10-17 NOTE — TELEPHONE ENCOUNTER
Medication is not on patient's active med list.  Last written 7- #36 tablets with 11 refills    Thank you  Eloisa De La Torre CPht    [facility]  Phone: [phone]  Fax: [phone]

## 2018-10-18 RX ORDER — SILDENAFIL 50 MG/1
50 TABLET, FILM COATED ORAL DAILY PRN
Qty: 12 TABLET | Refills: 1 | Status: CANCELLED | OUTPATIENT
Start: 2018-10-18

## 2018-10-18 NOTE — MR AVS SNAPSHOT
MRN:[id number]                      After Visit Summary   10/18/2018    Christina Sanchez    MRN: [id number]           Thank you!     Thank you for choosing [facility] for your care. Our goal is always to provide you with excellent care. Hearing back from our patients is one way we can continue to improve our services. Please take a few minutes to complete the written survey that you may receive in the mail after you visit with us. Thank you!        Patient Information     Date Of Birth          1964        Designated Caregiver       Most Recent Value    Caregiver    Will someone help with your care after discharge? yes    Name of designated caregiver Ethan     Phone number of caregiver [phone]    Caregiver address [address] [place] [address].      About your hospital stay     You were admitted on:  October 18, 2018 You last received care in the:   [facility]    You were discharged on:  October 25, 2018        Reason for your hospital stay       Rehabilitation after heart surgery and a stroke                  Who to Call     For medical emergencies, please call 911.  For non-urgent questions about your medical care, please call your primary care provider or clinic, [phone]          Attending Provider     Provider Specialty    Aide Merlos MD Physical Medicine and Rehab       Primary Care Provider Office Phone # Fax #    Darryn Daniel Irwin Wegener, [phone]       When to contact your care team       Call your primary doctor if you have any of the following: Chest pain, shortness of breath, worsening weakness, confusion, fevers, chills, difficulty speaking, facial droop, or any other symptoms you may find concerning                  After Care Instructions     Activity       Your activity upon discharge: activity as tolerated with Rollator and no driving for today            Diet       Combination Diet Regular Diet Adult; 6655-0371 Calories: Moderate                After Visit Summary   10/2/2018    Phi Ortiz Jr.    MRN: [id number]           Patient Information     Date Of Birth          1970        Visit Information        Provider Department      10/2/2018 11:20 AM Mckenna Caldwell APRN [facility]        Today's Diagnoses     Acute sinusitis with symptoms > 10 days    -  1    Morbid obesity due to excess calories (H)        Mild major depression (H)        Generalized anxiety disorder          Care Instructions    Take the antibiotic as prescribed.  Follow up in 2 months and will repeat diabetes test then.                    Sinusitis           What is sinusitis?   Sinusitis is swollen, infected linings of the sinuses. The sinuses are hollow spaces in the bones of your face and skull. They connect with the nose through small openings. Like the nose, their linings make mucus.   How does it occur?   Sinusitis occurs when the sinus linings become infected. The passageways from the sinuses to the nose are very narrow. Swelling and mucus may block the passageways. This leads to pressure changes in the sinuses that can be painful.   A number of things can cause swelling and sinusitis. Most often it's allergens (things that cause allergies, like pollen and mold) and viruses, such as viruses that cause the common cold. Whether the cause is allergies or a virus, the sinus linings can swell. When swelling causes the sinus passageway to swell shut, bacteria, viruses, and even fungus can be trapped in the sinuses and cause a sinus infection.   If your nasal bones have been injured or are deformed, causing partial blockage of the sinus openings, you are more likely to get sinusitis.   What are the symptoms?   Symptoms include:   feeling of fullness or pressure in your head   a headache that is most painful when you first wake up in the morning or when you bend your head down or forward   pain above or below your eyes   aching in the  Consistent CHO (4-6 CHO units/meal); 2 gm NA Diet            Monitor and record       blood glucose 4 times a day, before meals and at bedtime  blood pressure daily            Wound care and dressings       Instructions to care for your wound at home: as directed.                  Follow-up Appointments     Adult [facility] Follow-up and recommended labs and tests       Follow up with primary care provider, Joel Daniel Wegener, within 7 days to evaluate medication change, to evaluate after surgery and for hospital follow- up.  The following labs/tests are recommended: Potassium, Creatinine.      Appointments on Transfer and/or [facility] (with [facility] or [facility] provider or service). Call [phone] if you haven't heard regarding these appointments within 7 days of discharge.                  Your next 10 appointments already scheduled     Oct 29, 2018 11:00 AM CDT   SHORT with Servando Mendez MD   [facility] ([facility])    [address] [place] [address] [phone]              Additional Services     Cardiac Rehab Referral       If you have not heard from the scheduling office within 2 business days, please call [phone] for all locations, with the exception of [place], please call [phone] and [place], please call [phone].    Please be aware that coverage of these services is subject to the terms and limitations of your health insurance plan.  Call member services at your health plan with any benefit or coverage questions.            Occupational Therapy Referral       If you have not heard from the scheduling office within 2 business days, please call [phone] for all locations, with the exception of [place], please call [phone] and [place], please call [phone].    Please be aware that coverage of these services is subject to the terms and limitations of your health insurance plan.  Call member services at your health  upper jaw and teeth   runny or stuffy nose   cough, especially at night   fluid draining down the back of your throat (postnasal drainage)   sore throat in the morning or evening.   How is it diagnosed?   Your healthcare provider will ask about your symptoms and will examine you. You may have an X-ray to look for swelling, fluid, or small benign growths (polyps) in the sinuses.   How is it treated?   Decongestants may help. They may be nonprescription or prescription. They are available as liquids, pills, and nose sprays.   Your healthcare provider may prescribe an antibiotic. In some cases you may need to take decongestants and antibiotics for several weeks.   You may need nonprescription medicine for pain, such as acetaminophen or ibuprofen. Check with your healthcare provider before you give any medicine that contains aspirin or salicylates to a child or teen. This includes medicines like baby aspirin, some cold medicines, and Pepto Bismol. Children and teens who take aspirin are at risk for a serious illness called Reye's syndrome. Ibuprofen is an NSAID. Nonsteroidal anti-inflammatory medicines (NSAIDs) may cause stomach bleeding and other problems. These risks increase with age. Read the label and take as directed. Unless recommended by your healthcare provider, do not take NSAIDs for more than 10 days for any reason.   If you have chronic or repeated sinus infections, allergies may be the cause. Your healthcare provider may prescribe antihistamine tablets or prescription nasal sprays (steroids or cromolyn) to treat the allergies.   If you have chronic, severe sinusitis that does not respond to treatment with medicines, surgery may be done. The surgeon can create an extra or enlarged passageway in the wall of the sinus cavity. This allows the sinuses to drain more easily through the nasal passages. This should help them stay free of infection.   How long will the effects last?   Symptoms may get better  gradually over 3 to 10 days. Depending on what caused the sinusitis and how severe it is, it may last for days or weeks. The symptoms may come back if you do not finish all of your antibiotic.   How can I take care of myself?   Follow your healthcare provider's instructions.   If you are taking an antibiotic, take all of it as directed by your provider. If you stop taking the medicine when your symptoms are gone but before you have taken all of the medicine, symptoms may come back.   Avoid tobacco smoke.   If you have allergies, take care to avoid the things you are allergic to, such as animal dander.   Add moisture to the air with a humidifier or a vaporizer, unless you have mold allergy (mold may grow in your vaporizer).   Inhale steam from a basin of hot water or shower to help open your sinuses and relieve pain.   Use saline nasal sprays to help wash out nasal passages and clear some mucus from the airways.   Use decongestants as directed on the label or by your provider.   If you are using a nonprescription nasal-spray decongestant, generally you should not use it for more than 3 days. After 3 days it may cause your symptoms to get worse. Ask your healthcare provider if it is OK for you to use a nasal spray decongestant longer than this.   Get plenty of rest.   Drink more fluids to keep the mucus as thin as possible so your sinuses can drain more easily.   Put warm compresses on painful areas.   Take antibiotics as prescribed. Use all of the medicine, even after you feel better. Some sinus infections require 2 to 4 weeks of antibiotic treatment.   See your healthcare provider if the pain lasts for several days or gets worse.   If the sinus areas above or below your eyes are swollen or bulging, see your healthcare provider right away. This symptom may mean that the infection is spreading. A spreading infection can affect other parts of your body--even the brain--and needs to be treated promptly.   How can I help  plan with any benefit or coverage questions.            Speech Therapy Referral       If you have not heard from the scheduling office within 2 business days, please call [phone] for all locations, with the exception of [place], please call [phone] and [place], please call [phone].    Please be aware that coverage of these services is subject to the terms and limitations of your health insurance plan.  Call member services at your health plan with any benefit or coverage questions.                  Further instructions from your care team       Follow up appointments:    -- PCP in 1 week as scheduled with Dr. Mendez on Monday, October 29th at 11:00 am.  Dr. Wegener is out of town for three weeks, so scheduled with another physician on his team.    Recommendation for BMP, LFT, and INR.  Follow-up on tolerance of Simvastatin.    -- CV surgery as scheduled with Olga Lidia Mcclendon on Tuesday, October 30th at 10:00 am.    Please arrive at 9:45 am, there is free  parking available at the hospital.    [facility]  [address] [place] [address]    -- Cardiology as scheduled with Dr. Cantrell on Thursday, December 6th at 11:30 am (11:15 am arrival) at the [facility].    -- General surgery for umbilical hernia repair as scheduled with Dr. Ambrocio on December 4th at 7:15 am at [facility].  Do not eat or drink after midnight the night before.  Call Anni for any questions: [phone]    -- Neurology as scheduled with Dr. Ronquillo on Wednesday, December 19th at 12:00 pm (11:45 am arrival) at [facility].    [facility]  [address] [place] [address]  Phone: [phone]    -- MRI brain to be scheduled by neurology at appointment on December 19th.     -- Endocrinology at [facility]. They will contact you by end of day Friday to schedule this appointment. Please call [phone] with  "questions.    Warfarin Instruction     You have started taking a medicine called warfarin. This is a blood-thinning medicine (anticoagulant). It helps prevent and treat blood clots.      Before leaving the hospital, make sure you know how much to take and how long to take it.      You will need regular blood tests to make sure your blood is clotting safely. It is very important to see your doctor for regular blood tests.    Talk to your doctor before taking any new medicine (this includes over-the-counter drugs and herbal products). Many medicines can interact with warfarin. This may cause more bleeding or too much clotting.     Eating a lot of vitamin K--found in green, leafy vegetables--can change the way warfarin works in your body. Do NOT avoid these foods. Instead, try to eat the same amount each day.     Bleeding is the most common side-effect of warfarin. You may notice bleeding gums, a bloody nose, bruises and bleeding longer when you cut yourself. See a doctor at once if:   o You cough up blood  o You find blood in your stool (poop)  o You have a deep cut, or a cut that bleeds longer than 10 minutes   o You have a bad cut, hard fall, accident or hit your head (go to urgent care or the emergency room).    For women who can get pregnant: This medicine can harm an unborn baby. Be very careful not to get pregnant while taking this medicine. If you think you might be pregnant, call your doctor right away.    For more information, read \"Guide to Warfarin Therapy,  the booklet you received in the hospital.        Pending Results     No orders found from 10/16/2018 to 10/19/2018.            Statement of Approval     Ordered          10/25/18 0920  I have reviewed and agree with all the recommendations and orders detailed in this document.  EFFECTIVE NOW     Approved and electronically signed by:  Lavell Gambino MD             Admission Information     Date & Time Provider Department Dept. Phone    10/18/2018 " prevent sinusitis?   Treat your colds and allergies promptly. Use decongestants as soon as you start having symptoms.   Do not smoke and stay away from secondhand smoke.   Drink lots of fluids to keep the mucus thin.   Humidify your home if the air is particularly dry.   If you have sinus infections often, consider having allergy tests.   If sinusitis continues to be a problem despite treatment, you might need an exam by an ear, nose, and throat doctor (called an ENT or otolaryngologist). The specialist will check for polyps or a deformed bone that may be blocking your sinuses.     Published by [employer].  This content is reviewed periodically and is subject to change as new health information becomes available. The information is intended to inform and educate and is not a replacement for medical evaluation, advice, diagnosis or treatment by a healthcare professional.   Developed by [employer].   ? 2010 [employer] and/or its affiliates. All Rights Reserved.   Copyright   Clinical Reference Systems 2011  Adult Health Advisor          Thank you for choosing [facility].  You may be receiving a survey in the mail from [employer] regarding your visit today.  Please take a few minutes to complete and return the survey to let us know how we are doing.      Our Clinic hours are:  Mondays    7:20 am - 7 pm  Tues -  Fri  7:20 am - 5 pm    Clinic Phone: [phone]    The clinic lab opens at 7:30 am Mon - Fri and appointments are required.    [facility]. [phone]  Monday  8 am - 7pm  Tues - Fri 8 am - 5:30 pm                 Follow-ups after your visit        Follow-up notes from your care team     Return in about 2 months (around 12/2/2018), or if symptoms worsen or fail to improve, for Lab Work, Routine Visit.      Your next 10 appointments already scheduled     Nov 16, 2018   Procedure with Phi Tinoco MD   [facility] ([facility])    [address]  "Aide Merlos MD UR ACUTE REHAB -[phone]      Your Vitals Were     Blood Pressure Pulse Temperature Respirations Height Weight    173/87 (BP Location: Right arm) 84 97.8  F (36.6  C) (Oral) 16 1.575 m (5' 2\") 82.9 kg (182 lb 11.2 oz)    Pulse Oximetry BMI (Body Mass Index)                95% 33.42 kg/m2          [employer] Information     [employer] gives you secure access to your electronic health record. If you see a primary care provider, you can also send messages to your care team and make appointments. If you have questions, please call your primary care clinic.  If you do not have a primary care provider, please call [phone] and they will assist you.        Care EveryWhere ID     This is your Care EveryWhere ID. This could be used by other organizations to access your [facility] medical records  [id number]        Equal Access to Services     DONNIE CHAWLA : Audra Dawson, walaly castellanos, devang kaalmaradha oneill, justus bonilla. So [facility] [phone].    ATENCIÓN: Si habla español, tiene a warren disposición servicios gratuitos de asistencia lingüística. Llame al [phone].    We comply with applicable federal civil rights laws and [place] laws. We do not discriminate on the basis of race, color, national origin, age, disability, sex, sexual orientation, or gender identity.               Review of your medicines      START taking        Dose / Directions    amLODIPine 10 MG tablet   Commonly known as:  NORVASC   Used for:  Hypertension goal BP (blood pressure) < 140/90        Dose:  10 mg   Take 1 tablet (10 mg) by mouth daily   Quantity:  30 tablet   Refills:  0       aspirin 81 MG chewable tablet        Dose:  81 mg   Take 1 tablet (81 mg) by mouth daily   Quantity:  30 tablet   Refills:  0       bisacodyl 10 MG Suppository   Commonly known as:  DULCOLAX   Used for:  Constipation, unspecified constipation type        Dose:  10 mg   [address]  [facility] [address]   [phone]           The medical center is located at [address] (between [address] in [place], [place] north of [place]).            Dec 03, 2018  5:20 PM CST   SHORT with MICHELLE Garcia CNP   [facility] ([facility])    [address]  [facility] [id number] [phone]              Who to contact     If you have questions or need follow up information about today's clinic visit or your schedule please contact [facility] directly at [phone].  Normal or non-critical lab and imaging results will be communicated to you by MyChart, letter or phone within 4 business days after the clinic has received the results. If you do not hear from us within 7 days, please contact the clinic through MyChart or phone. If you have a critical or abnormal lab result, we will notify you by phone as soon as possible.  Submit refill requests through [employer] or call your pharmacy and they will forward the refill request to us. Please allow 3 business days for your refill to be completed.          Additional Information About Your Visit        Care EveryWhere ID     This is your Care EveryWhere ID. This could be used by other organizations to access your [facility] medical records  [id number]        Your Vitals Were     Pulse Temperature Respirations Height Pulse Oximetry BMI (Body Mass Index)    74 98.4  F (36.9  C) (Oral) 16 5' 11\" (1.803 m) 99% 34.17 kg/m2       Blood Pressure from Last 3 Encounters:   10/02/18 130/70   09/13/18 124/78   07/24/18 (!) 132/93    Weight from Last 3 Encounters:   10/02/18 245 lb (111.1 kg)   09/13/18 245 lb 3.2 oz (111.2 kg)   05/08/18 256 lb (116.1 kg)              We Performed the Following     DEPRESSION ACTION PLAN (DAP)          Today's Medication Changes          These changes are accurate as of 10/2/18 11:35 AM.  If you have any questions, ask your nurse or " doctor.               Start taking these medicines.        Dose/Directions    amoxicillin-clavulanate 875-125 MG per tablet   Commonly known as:  AUGMENTIN   Used for:  Acute sinusitis with symptoms > 10 days   Started by:  Mckenna aCldwell APRN CNP        Dose:  1 tablet   Take 1 tablet by mouth 2 times daily   Quantity:  20 tablet   Refills:  0            Where to get your medicines      These medications were sent to [facility] [place], [place] - [address]  [address] [facility] [address]     Phone:  [phone]     amoxicillin-clavulanate 875-125 MG per tablet                Primary Care Provider Office Phone # Fax #    MICHELLE Garcia [phone] [address]  [facility] [address]        Equal Access to Services     [facility]: Hadii karen ku hadasho Soomaali, waaxda luqadaha, qaybta kaalmada adeegyada, cristal pak haymaria isabel busby . So [facility] [phone].    ATENCIÓN: Si habla español, tiene a king disposición servicios gratuitos de asistencia lingüística. Llame al [phone].    We comply with applicable federal civil rights laws and [place] laws. We do not discriminate on the basis of race, color, national origin, age, disability, sex, sexual orientation, or gender identity.            Thank you!     Thank you for choosing [facility]  for your care. Our goal is always to provide you with excellent care. Hearing back from our patients is one way we can continue to improve our services. Please take a few minutes to complete the written survey that you may receive in the mail after your visit with us. Thank you!             Your Updated Medication List - Protect others around you: Learn how to safely use, store and throw away your medicines at www.disposemymeds.org.          This list is accurate as of 10/2/18 11:35 AM.  Always use your most recent med list.                   Brand  suppository (10 mg) rectally daily as needed for constipation   Quantity:  10 suppository   Refills:  0       docusate sodium 100 MG capsule   Commonly known as:  COLACE   Used for:  Constipation, unspecified constipation type        Dose:  100 mg   Take 1 capsule (100 mg) by mouth 2 times daily as needed for constipation   Quantity:  60 capsule   Refills:  0       liraglutide 18 MG/3ML soln   Commonly known as:  VICTOZA        Dose:  0.6 mg   Inject 0.6 mg Subcutaneous daily   Quantity:  3 mL   Refills:  0       losartan 100 MG tablet   Commonly known as:  COZAAR   Used for:  Hypertension goal BP (blood pressure) < 140/90        Dose:  100 mg   Take 1 tablet (100 mg) by mouth daily   Quantity:  30 tablet   Refills:  0       metoprolol tartrate 50 MG tablet   Commonly known as:  LOPRESSOR   Used for:  Hypertension goal BP (blood pressure) < 140/90        Dose:  125 mg   Take 2.5 tablets (125 mg) by mouth 2 times daily   Quantity:  150 tablet   Refills:  0       polyethylene glycol Packet   Commonly known as:  MIRALAX/GLYCOLAX   Used for:  Constipation, unspecified constipation type        Dose:  17 g   Take 17 g by mouth daily as needed for constipation   Quantity:  7 packet   Refills:  0       simvastatin 40 MG tablet   Commonly known as:  ZOCOR        Dose:  40 mg   Take 1 tablet (40 mg) by mouth At Bedtime   Quantity:  30 tablet   Refills:  0       warfarin 4 MG tablet   Commonly known as:  COUMADIN   Used for:  S/P CABG (coronary artery bypass graft)        Dose:  4 mg   Take 1 tablet (4 mg) by mouth daily for 4 days   Quantity:  4 tablet   Refills:  0         CONTINUE these medicines which have NOT CHANGED        Dose / Directions    ACETAMINOPHEN PO        Dose:  650 mg   Take 650 mg by mouth every 6 hours as needed for pain   Refills:  0       blood glucose monitoring lancets   Used for:  Diabetes mellitus, type 2 (H)        Lancets that go with meter covered by insurance test 6-8 times daily   Quantity:  200  Name Dispense Instructions for use Diagnosis    albuterol 108 (90 Base) MCG/ACT inhaler    PROAIR HFA/PROVENTIL HFA/VENTOLIN HFA    1 Inhaler    Inhale 2 puffs into the lungs every 6 hours as needed for shortness of breath / dyspnea or wheezing    Chronic cough       amoxicillin-clavulanate 875-125 MG per tablet    AUGMENTIN    20 tablet    Take 1 tablet by mouth 2 times daily    Acute sinusitis with symptoms > 10 days       aspirin 81 MG EC tablet     360 tablet    Take 4 tablets (325 mg) by mouth daily    Type 2 diabetes mellitus without complication, without long-term current use of insulin (H)       blood glucose lancing device     30 each    Use to test blood sugars 1 times daily or as directed.    Type 2 diabetes mellitus without complications (H)       blood glucose monitoring meter device kit     1 kit    Use to test blood sugars 1 times daily or as directed.    Type 2 diabetes mellitus without complications (H)       blood glucose monitoring test strip    ONETOUCH ULTRA    1 Box    Use to test blood sugars 1 times daily or as directed.    Type 2 diabetes mellitus without complications (H)       fluticasone 50 MCG/ACT spray    FLONASE    1 Bottle    Spray 1-2 sprays into both nostrils daily    Chronic rhinitis, unspecified type       losartan 25 MG tablet    COZAAR    45 tablet    Take 0.5 tablets (12.5 mg) by mouth daily    Type 2 diabetes mellitus without complication, without long-term current use of insulin (H), Essential hypertension, benign       metFORMIN 500 MG 24 hr tablet    GLUCOPHAGE-XR    90 tablet    Take 1 tablet (500 mg) by mouth every evening    Type 2 diabetes mellitus without complication, without long-term current use of insulin (H)       nitroGLYcerin 0.4 MG sublingual tablet    NITROSTAT    25 tablet    Place 1 tablet (0.4 mg) under the tongue every 5 minutes as needed for chest pain (CALL 911 IF NOT IMPROVED AFTER THREE CONSECUTIVE DOSES)        order for PATRICIA Yip was set up on  each   Refills:  3       blood glucose monitoring meter device kit   Commonly known as:  no brand specified   Used for:  Diabetes mellitus, type 2 (H)        Dose:  1 kit   1 kit 6 times daily Meter covered by insurance   Quantity:  1 kit   Refills:  1       BLOOD GLUCOSE TEST STRIPS Strp   Used for:  Diabetes mellitus, type 2 (H)        Strips that go with meter covered by insurance test 6-8 times dailiy   Quantity:  200 strip   Refills:  11       order for DME   Used for:  Idiopathic progressive polyneuropathy, Diabetic neuropathy (H)        Equipment being ordered: Walker with four wheels, brakes and seat with basket.   Quantity:  1 each   Refills:  0         STOP taking     azithromycin 250 MG tablet   Commonly known as:  ZITHROMAX           glucagon 1 MG kit                Where to get your medicines      These medications were sent to [facility] [place], [place] - [address]  [address] [address]     Phone:  [phone]     amLODIPine 10 MG tablet    aspirin 81 MG chewable tablet    bisacodyl 10 MG Suppository    docusate sodium 100 MG capsule    liraglutide 18 MG/3ML soln    losartan 100 MG tablet    metoprolol tartrate 50 MG tablet    polyethylene glycol Packet    simvastatin 40 MG tablet    warfarin 4 MG tablet                Protect others around you: Learn how to safely use, store and throw away your medicines at www.disposemymeds.org.             Medication List: This is a list of all your medications and when to take them. Check marks below indicate your daily home schedule. Keep this list as a reference.      Medications           Morning Afternoon Evening Bedtime As Needed    ACETAMINOPHEN PO   Take 650 mg by mouth every 6 hours as needed for pain   Last time this was given:  650 mg on 10/24/2018  9:58 PM                                amLODIPine 10 MG tablet   Commonly known as:  NORVASC   Take 1 tablet (10 mg) by mouth daily   Last time this was given:   10 mg on 10/25/2018  8:41 AM                                aspirin 81 MG chewable tablet   Take 1 tablet (81 mg) by mouth daily   Last time this was given:  81 mg on 10/25/2018  8:42 AM                                bisacodyl 10 MG Suppository   Commonly known as:  DULCOLAX   Place 1 suppository (10 mg) rectally daily as needed for constipation                                blood glucose monitoring lancets   Lancets that go with meter covered by insurance test 6-8 times daily                                blood glucose monitoring meter device kit   Commonly known as:  no brand specified   1 kit 6 times daily Meter covered by insurance                                BLOOD GLUCOSE TEST STRIPS Strp   Strips that go with meter covered by insurance test 6-8 times dailiy                                docusate sodium 100 MG capsule   Commonly known as:  COLACE   Take 1 capsule (100 mg) by mouth 2 times daily as needed for constipation   Last time this was given:  100 mg on 10/23/2018 10:20 AM                                liraglutide 18 MG/3ML soln   Commonly known as:  VICTOZA   Inject 0.6 mg Subcutaneous daily   Last time this was given:  0.6 mg on 10/25/2018  8:38 AM                                losartan 100 MG tablet   Commonly known as:  COZAAR   Take 1 tablet (100 mg) by mouth daily   Last time this was given:  100 mg on 10/25/2018  8:42 AM                                metoprolol tartrate 50 MG tablet   Commonly known as:  LOPRESSOR   Take 2.5 tablets (125 mg) by mouth 2 times daily   Last time this was given:  125 mg on 10/25/2018  8:41 AM                                order for DME   Equipment being ordered: Walker with four wheels, brakes and seat with basket.                                polyethylene glycol Packet   Commonly known as:  MIRALAX/GLYCOLAX   Take 17 g by mouth daily as needed for constipation                                simvastatin 40 MG tablet   Commonly known as:  ZOCOR   Take 1  [employer]  Type REMstar Auto (System One 60 series)  Serial Number: [id number] Modem Number: [id number] Pressure: AUTO-TITRATE CPAP 7-12 cm H20 Mask of choice: AIRFIT F 10 FULL FACE MASK Size: MEDIUM HEATED TUBING AND MODEM PROVIDED        pantoprazole 40 MG EC tablet    PROTONIX    90 tablet    TAKE 1 TABLET BY MOUTH DAILY, 30 TO 60 MINUTES BEFORE A MEAL.    Gastroesophageal reflux disease, esophagitis presence not specified       rosuvastatin 40 MG tablet    CRESTOR    90 tablet    Take 1 tablet (40 mg) by mouth daily    Hyperlipidemia LDL goal <100          tablet (40 mg) by mouth At Bedtime                                warfarin 4 MG tablet   Commonly known as:  COUMADIN   Take 1 tablet (4 mg) by mouth daily for 4 days   Last time this was given:  5 mg on 10/24/2018  6:09 PM

## 2018-10-19 RX ORDER — SILDENAFIL 50 MG/1
50 TABLET, FILM COATED ORAL DAILY PRN
Qty: 6 TABLET | Refills: 1 | Status: SHIPPED | OUTPATIENT
Start: 2018-10-19 | End: 2018-12-26

## 2018-10-19 NOTE — TELEPHONE ENCOUNTER
I have attempted to contact this patient by phone with the following results: left message to return my call on answering machine. CSS ok to give message below.    Mckenna Purvis RN

## 2018-10-19 NOTE — TELEPHONE ENCOUNTER
Patient states he has never taken the NTG and does not plan to. They had given it to him in the ER due to anxiety. State he can drop it off here at the clinic so we know he does not have it if we would like. He wants a call back whether we approve the Viagra or not based on Mckenna Rubio decision. Thank you!    Mckenna Purvis RN

## 2018-11-15 ENCOUNTER — ANESTHESIA EVENT (OUTPATIENT)
Dept: GASTROENTEROLOGY | Facility: CLINIC | Age: 48
End: 2018-11-15
Payer: COMMERCIAL

## 2018-11-15 NOTE — ANESTHESIA PREPROCEDURE EVALUATION
Anesthesia Evaluation     . Pt has had prior anesthetic.     No history of anesthetic complications          ROS/MED HX    ENT/Pulmonary:  - neg pulmonary ROS     Neurologic:  - neg neurologic ROS     Cardiovascular: Comment: Chronic ischemic heart disease    (+) Dyslipidemia, hypertension--CAD, --. : . . . :. . Previous cardiac testing date:results:Stress Testdate:6-2017 results:Impression    1. Exercise: Average exercise capacity       2. EKG: Negative for ischemia       3. Symptoms: No baseline chest pain, during peak exercise, 6/10  chest pain that resolved several minutes into recovery  4. Myocardial perfusion imaging using single isotope technique  demonstrated normal perfusion, no ischemia or infarct.   5. Gated images demonstrated normal wall motion.  The left ventricular  systolic function is 49% at rest, 55% with stress.  6. Nuclear stress test from 2014 showed mild anterior ischemia.ECG reviewed date:7-2018 results:Sinus  Rhythm   WITHIN NORMAL LIMITS   date: results:          METS/Exercise Tolerance:     Hematologic:  - neg hematologic  ROS       Musculoskeletal:  - neg musculoskeletal ROS       GI/Hepatic:     (+) GERD liver disease,       Renal/Genitourinary:  - ROS Renal section negative       Endo:     (+) type II DM Last HgA1c: 5.7 date: 9-2018 Obesity, Other Endocrine Disorder morbid obesity.      Psychiatric:     (+) psychiatric history anxiety and depression      Infectious Disease:  - neg infectious disease ROS       Malignancy:      - no malignancy   Other:    - neg other ROS                 Physical Exam  Normal systems: cardiovascular, pulmonary and dental    Airway   Mallampati: III  TM distance: >3 FB  Neck ROM: full    Dental     Cardiovascular       Pulmonary                     Anesthesia Plan      History & Physical Review  History and physical reviewed and following examination; no interval change.    ASA Status:  3 .    NPO Status:  > 6 hours    Plan for MAC with Propofol and  Intravenous induction. Reason for MAC:  Deep or markedly invasive procedure (G8)  PONV prophylaxis:  Ondansetron (or other 5HT-3) and Dexamethasone or Solumedrol       Postoperative Care  Postoperative pain management:  IV analgesics, Multi-modal analgesia and Oral pain medications.      Consents  Anesthetic plan, risks, benefits and alternatives discussed with:  Patient..                          .

## 2018-11-16 ENCOUNTER — HOSPITAL ENCOUNTER (OUTPATIENT)
Facility: CLINIC | Age: 48
Discharge: HOME | End: 2018-11-16
Attending: SURGERY | Admitting: SURGERY
Payer: COMMERCIAL

## 2018-11-16 ENCOUNTER — ANESTHESIA (OUTPATIENT)
Dept: GASTROENTEROLOGY | Facility: CLINIC | Age: 48
End: 2018-11-16
Payer: COMMERCIAL

## 2018-11-16 VITALS
WEIGHT: 250 LBS | RESPIRATION RATE: 16 BRPM | OXYGEN SATURATION: 95 % | TEMPERATURE: 98.2 F | DIASTOLIC BLOOD PRESSURE: 79 MMHG | BODY MASS INDEX: 35 KG/M2 | HEIGHT: 71 IN | HEART RATE: 66 BPM | SYSTOLIC BLOOD PRESSURE: 123 MMHG

## 2018-11-16 LAB — UPPER GI ENDOSCOPY: NORMAL

## 2018-11-16 PROCEDURE — 88305 TISSUE EXAM BY PATHOLOGIST: CPT | Mod: 26 | Performed by: SURGERY

## 2018-11-16 PROCEDURE — 25000128 H RX IP 250 OP 636: Performed by: NURSE ANESTHETIST, CERTIFIED REGISTERED

## 2018-11-16 PROCEDURE — 25000128 H RX IP 250 OP 636: Performed by: SURGERY

## 2018-11-16 PROCEDURE — 88305 TISSUE EXAM BY PATHOLOGIST: CPT | Performed by: SURGERY

## 2018-11-16 PROCEDURE — 37000008 ZZH ANESTHESIA TECHNICAL FEE, 1ST 30 MIN: Performed by: SURGERY

## 2018-11-16 PROCEDURE — 43239 EGD BIOPSY SINGLE/MULTIPLE: CPT | Performed by: SURGERY

## 2018-11-16 PROCEDURE — 25000125 ZZHC RX 250: Performed by: NURSE ANESTHETIST, CERTIFIED REGISTERED

## 2018-11-16 PROCEDURE — 25000125 ZZHC RX 250: Performed by: SURGERY

## 2018-11-16 RX ORDER — SODIUM CHLORIDE, SODIUM LACTATE, POTASSIUM CHLORIDE, CALCIUM CHLORIDE 600; 310; 30; 20 MG/100ML; MG/100ML; MG/100ML; MG/100ML
INJECTION, SOLUTION INTRAVENOUS CONTINUOUS
Status: DISCONTINUED | OUTPATIENT
Start: 2018-11-16 | End: 2018-11-16 | Stop reason: HOSPADM

## 2018-11-16 RX ORDER — GLYCOPYRROLATE 0.2 MG/ML
INJECTION, SOLUTION INTRAMUSCULAR; INTRAVENOUS PRN
Status: DISCONTINUED | OUTPATIENT
Start: 2018-11-16 | End: 2018-11-16

## 2018-11-16 RX ORDER — LIDOCAINE HYDROCHLORIDE 10 MG/ML
INJECTION, SOLUTION INFILTRATION; PERINEURAL PRN
Status: DISCONTINUED | OUTPATIENT
Start: 2018-11-16 | End: 2018-11-16

## 2018-11-16 RX ORDER — PROPOFOL 10 MG/ML
INJECTION, EMULSION INTRAVENOUS PRN
Status: DISCONTINUED | OUTPATIENT
Start: 2018-11-16 | End: 2018-11-16

## 2018-11-16 RX ORDER — ONDANSETRON 2 MG/ML
4 INJECTION INTRAMUSCULAR; INTRAVENOUS
Status: DISCONTINUED | OUTPATIENT
Start: 2018-11-16 | End: 2018-11-16 | Stop reason: HOSPADM

## 2018-11-16 RX ORDER — LIDOCAINE 40 MG/G
CREAM TOPICAL
Status: DISCONTINUED | OUTPATIENT
Start: 2018-11-16 | End: 2018-11-16 | Stop reason: HOSPADM

## 2018-11-16 RX ADMIN — SODIUM CHLORIDE, POTASSIUM CHLORIDE, SODIUM LACTATE AND CALCIUM CHLORIDE: 600; 310; 30; 20 INJECTION, SOLUTION INTRAVENOUS at 09:11

## 2018-11-16 RX ADMIN — LIDOCAINE HYDROCHLORIDE 50 MG: 10 INJECTION, SOLUTION INFILTRATION; PERINEURAL at 09:55

## 2018-11-16 RX ADMIN — GLYCOPYRROLATE 0.2 MG: 0.2 INJECTION, SOLUTION INTRAMUSCULAR; INTRAVENOUS at 09:50

## 2018-11-16 RX ADMIN — LIDOCAINE HYDROCHLORIDE 1 ML: 10 INJECTION, SOLUTION EPIDURAL; INFILTRATION; INTRACAUDAL; PERINEURAL at 09:11

## 2018-11-16 RX ADMIN — PROPOFOL 50 MG: 10 INJECTION, EMULSION INTRAVENOUS at 10:02

## 2018-11-16 RX ADMIN — PROPOFOL 150 MG: 10 INJECTION, EMULSION INTRAVENOUS at 09:55

## 2018-11-16 NOTE — ANESTHESIA POSTPROCEDURE EVALUATION
Patient: Phi Ortiz Jr.    Procedure(s):  gastroscopy with biopsy and polypectomy    Diagnosis:gastroesophageal reflux   diagnostic  Diagnosis Additional Information: No value filed.    Anesthesia Type:  MAC    Note:  Anesthesia Post Evaluation    Patient location during evaluation: Phase 2 and Bedside  Patient participation: Able to fully participate in evaluation  Level of consciousness: awake and alert  Pain management: adequate  Cardiovascular status: hemodynamically stable and acceptable  Respiratory status: acceptable and room air  Hydration status: acceptable  PONV: none     Anesthetic complications: None          Last vitals:  Vitals:    11/16/18 0850   BP: 132/89   Resp: 18   Temp: 36.8  C (98.2  F)   SpO2: 97%         Electronically Signed By: MICHELLE Roque CRNA  November 16, 2018  10:09 AM

## 2018-11-16 NOTE — ANESTHESIA CARE TRANSFER NOTE
Patient: Phi Ortiz Jr.    Procedure(s):  gastroscopy with biopsy and polypectomy    Diagnosis: gastroesophageal reflux   diagnostic  Diagnosis Additional Information: No value filed.    Anesthesia Type:   MAC     Note:  Airway :Room Air  Patient transferred to:Phase II  Handoff Report: Identifed the Patient, Identified the Reponsible Provider, Reviewed the pertinent medical history, Discussed the surgical course, Reviewed Intra-OP anesthesia mangement and issues during anesthesia, Set expectations for post-procedure period and Allowed opportunity for questions and acknowledgement of understanding      Vitals: (Last set prior to Anesthesia Care Transfer)    CRNA VITALS  11/16/2018 0939 - 11/16/2018 1009      11/16/2018             Pulse: 70    SpO2: 96 %                Electronically Signed By: MICHELLE Roque CRNA  November 16, 2018  10:09 AM

## 2018-11-16 NOTE — BRIEF OP NOTE
[facility]   Brief Operative Note    Pre-operative diagnosis: gastroesophageal reflux   diagnostic   Post-operative diagnosis fundic polyps, otherwise normal     Procedure: Procedure(s):  gastroscopy with biopsy and polypectomy   Surgeon(s): Surgeon(s) and Role:     * Phi Tinoco MD - Primary   Estimated blood loss: * No values recorded between 11/16/2018 12:00 AM and 11/16/2018 10:09 AM *    Specimens:   ID Type Source Tests Collected by Time Destination   A : eval for H. Pylori Tissue Stomach, Antrum SURGICAL PATHOLOGY EXAM Phi Tinoco MD 11/16/2018 10:01 AM    B :  Polyp Stomach, Fundus SURGICAL PATHOLOGY EXAM Phi Tinoco MD 11/16/2018 10:04 AM       Findings: 1. Several fundic polyps - one removed for biopsy  2. Exam otherwise normal

## 2018-11-16 NOTE — H&P
48 year old male here for upper endoscopy for evaluation of chronic GERD.        Patient Active Problem List   Diagnosis     GERD (gastroesophageal reflux disease)     Generalized anxiety disorder     Fatty liver     Family history of coronary artery disease     Hypertriglyceridemia     Vitamin D deficiency     Chest pain     Chronic ischemic heart disease     Hyperlipidemia LDL goal <70     Type 2 diabetes mellitus without complications (H)     Mild major depression (H)     overweight BMI greater than 35     Essential hypertension, benign     Urinary calculus, unspecified       Past Medical History:   Diagnosis Date     Anxiety      Chest pain 1/27/2014     GERD (gastroesophageal reflux disease)        Past Surgical History:   Procedure Laterality Date     NO HISTORY OF SURGERY         Family History   Problem Relation Age of Onset     C.A.D. Father      heart attack 30's     Cerebrovascular Disease Father      Diabetes Father      Hypertension Father      Hypertension Mother      GASTROINTESTINAL DISEASE Mother      colitis     Diabetes Maternal Grandmother      snores     Dementia Maternal Grandmother      C.A.D. Maternal Grandfather      MI, leg circulation problems     C.A.D. Paternal Grandfather      MI in 30s, snores     Neurologic Disorder Sister      vertigo.  fibromyalgia     Hyperlipidemia Daughter      Breast Cancer No family hx of      Cancer - colorectal No family hx of      Prostate Cancer No family hx of        No current outpatient prescriptions on file.       No Known Allergies    Pt reports that he has never smoked. He has quit using smokeless tobacco. He reports that he drinks alcohol. He reports that he does not use illicit drugs.    Exam:    Awake, Alert OX3  Lungs - CTA bilaterally  CV - RRR, no murmurs, distal pulses intact  Abd - soft, non-distended, non-tender, +BS  Extr - No cyanosis or edema    A/P 48 year old year old male in need of upper endoscopy for evaluation of chronic GERD. Risks,  benefits, alternatives, and complications were discussed including the possibility of perforation and the patient agreed to proceed.    Phi Tinoco MD

## 2018-11-20 LAB — COPATH REPORT: NORMAL

## 2018-12-03 ENCOUNTER — APPOINTMENT (OUTPATIENT)
Dept: GENERAL RADIOLOGY | Facility: CLINIC | Age: 48
End: 2018-12-03
Attending: EMERGENCY MEDICINE
Payer: OTHER MISCELLANEOUS

## 2018-12-03 ENCOUNTER — HOSPITAL ENCOUNTER (EMERGENCY)
Facility: CLINIC | Age: 48
Discharge: HOME | End: 2018-12-03
Attending: EMERGENCY MEDICINE | Admitting: EMERGENCY MEDICINE
Payer: OTHER MISCELLANEOUS

## 2018-12-03 ENCOUNTER — OFFICE VISIT (OUTPATIENT)
Dept: FAMILY MEDICINE | Facility: CLINIC | Age: 48
End: 2018-12-03
Payer: OTHER MISCELLANEOUS

## 2018-12-03 VITALS
RESPIRATION RATE: 16 BRPM | OXYGEN SATURATION: 98 % | BODY MASS INDEX: 33.04 KG/M2 | HEIGHT: 71 IN | SYSTOLIC BLOOD PRESSURE: 120 MMHG | DIASTOLIC BLOOD PRESSURE: 78 MMHG | WEIGHT: 236 LBS | HEART RATE: 56 BPM | TEMPERATURE: 98.3 F

## 2018-12-03 VITALS
SYSTOLIC BLOOD PRESSURE: 142 MMHG | OXYGEN SATURATION: 98 % | DIASTOLIC BLOOD PRESSURE: 97 MMHG | RESPIRATION RATE: 16 BRPM | HEIGHT: 71 IN | WEIGHT: 236 LBS | TEMPERATURE: 98.2 F | BODY MASS INDEX: 33.04 KG/M2

## 2018-12-03 DIAGNOSIS — M75.42 IMPINGEMENT SYNDROME OF LEFT SHOULDER: ICD-10-CM

## 2018-12-03 DIAGNOSIS — M25.512 ACUTE PAIN OF LEFT SHOULDER: Primary | ICD-10-CM

## 2018-12-03 PROCEDURE — 99283 EMERGENCY DEPT VISIT LOW MDM: CPT | Performed by: EMERGENCY MEDICINE

## 2018-12-03 PROCEDURE — 99213 OFFICE O/P EST LOW 20 MIN: CPT | Performed by: NURSE PRACTITIONER

## 2018-12-03 PROCEDURE — 73030 X-RAY EXAM OF SHOULDER: CPT | Mod: LT

## 2018-12-03 PROCEDURE — 99282 EMERGENCY DEPT VISIT SF MDM: CPT | Mod: Z6 | Performed by: EMERGENCY MEDICINE

## 2018-12-03 RX ORDER — DICLOFENAC SODIUM 75 MG/1
75 TABLET, DELAYED RELEASE ORAL 2 TIMES DAILY PRN
Qty: 30 TABLET | Refills: 0 | Status: SHIPPED | OUTPATIENT
Start: 2018-12-03 | End: 2018-12-24

## 2018-12-03 ASSESSMENT — PAIN SCALES - GENERAL: PAINLEVEL: EXTREME PAIN (8)

## 2018-12-03 NOTE — ED AVS SNAPSHOT
[facility] Emergency Department    [address] [address]    Phone:  [phone]    Fax:  [phone]                                       Phi Ortiz Jr.   MRN: [id number]    Department:  [facility] Emergency Department   Date of Visit:  12/3/2018           Patient Information     Date Of Birth          1970        Your diagnoses for this visit were:     Impingement syndrome of left shoulder        You were seen by Ra Buckenr MD.        Discharge Instructions         Shoulder Impingement Syndrome  The rotator cuff is a group of muscles and tendons that surround the shoulder joint. These muscles and tendons hold the arm in its joint. They help the shoulder move. The rotator cuff muscles and tendons can become irritated from repeated rubbing against the shoulder bone. This is called shoulder impingement syndrome or rotator cuff tendonitis.     If your case is mild, you may only need to rest the shoulder and then do certain exercises to strengthen the muscles. You can also take anti-inflammatory medicines. Steroid injections into the shoulder can ease inflammation. But you can have only a limited number of these. If the condition gets worse, your shoulder muscles may become thin and weak. This can lead to a rotator cuff tear.  Symptoms of shoulder impingement syndrome may include:    Shoulder pain that gets worse when you raise your arm overhead    Weakness of the shoulder muscles when you use your arm overhead    Popping and clicking when you move your shoulder    Shoulder pain that wakes you up at night, especially when you sleep on the affected shoulder    Sudden pain in your shoulder when you lift or reach  Home care  Follow these tips to take care of yourself at home:    Avoid activities that make your pain worse. These include raising your arms overhead, repeating the same motion over and over, or lifting heavy objects.    Don t hold your arm in one position for a  long time. Keep it moving.    Put an ice pack on the sore area for 20 minutes every 1 to 2 hours for the first day. You can make an ice pack by putting ice cubes in a plastic bag. Wrap the bag in a towel before putting it on your shoulder. A frozen bag of peas or something similar can also be used as an ice pack. Use the ice packs 3 to 4 times a day for the next 2 days. Continue using the ice to relieve of pain and swelling as needed.    You may take acetaminophen or ibuprofen to control pain, unless another medicine was prescribed. If prednisone was prescribed, don t take anti-inflammatory medicines. If you have chronic liver or kidney disease or ever had a stomach ulcer or gastrointestinal bleeding, talk with your doctor before using these medicines.    After your symptoms ease, you may get physical therapy or start a home exercise program. This can strengthen your shoulder muscles and help your range of motion. Talk with your doctor about what is best for your condition.  Follow-up care  Follow up with your healthcare provider, or as advised.  When to seek medical advice  Call your healthcare provider right away if any of these occur:    Shoulder pain that gets worse and wakes you up at night    Your shoulder or arm swells    Numbness, tingling, or pain that travels down the arm to the hand    Loss of shoulder strength    Fever or chills  Date Last Reviewed: 8/1/2016 2000-2018 [employer]. [address] [place] [address]. All rights reserved. This information is not intended as a substitute for professional medical care. Always follow your healthcare professional's instructions.      Work restriction for the next week.  Follow-up orthopedics for further evaluation.  Tylenol 1000 mg every 4 hours as needed,   ibuprofen 800 mg every 6 hours as needed.    Your next 10 appointments already scheduled     Dec 03, 2018  1:20 PM CST   Office Visit with MICHELLE Garcia [facility]  [facility]    [address]  [facility] [id number] [phone]           Bring a current list of meds and any records pertaining to this visit. For Physicals, please bring immunization records and any forms needing to be filled out. Please arrive 10 minutes early to complete paperwork.              24 Hour Appointment Hotline       To make an appointment at any [facility], call [phone] ([phone]). If you don't have a family doctor or clinic, we will help you find one. [facility] are conveniently located to serve the needs of you and your family.             Review of your medicines      Our records show that you are taking the medicines listed below. If these are incorrect, please call your family doctor or clinic.        Dose / Directions Last dose taken    aspirin 81 MG EC tablet   Dose:  325 mg   Quantity:  360 tablet        Take 4 tablets (325 mg) by mouth daily   Refills:  3        order for DME        Phi was set up on [employer]  Type [employer] Auto (System One 60 series)  Serial Number: [id number] Modem Number: [id number] Pressure: AUTO-TITRATE CPAP 7-12 cm H20 Mask of choice: AIRFIT F 10 FULL FACE MASK Size: MEDIUM HEATED TUBING AND MODEM PROVIDED   Refills:  0        pantoprazole 40 MG EC tablet   Commonly known as:  PROTONIX   Quantity:  90 tablet        TAKE 1 TABLET BY MOUTH DAILY, 30 TO 60 MINUTES BEFORE A MEAL.   Refills:  3        rosuvastatin 40 MG tablet   Commonly known as:  CRESTOR   Dose:  40 mg   Quantity:  90 tablet        Take 1 tablet (40 mg) by mouth daily   Refills:  3        sildenafil 50 MG tablet   Commonly known as:  VIAGRA   Dose:  50 mg   Quantity:  6 tablet        Take 1 tablet (50 mg) by mouth daily as needed 30 min to 4 hrs before sex. Do not use with nitroglycerin, terazosin or doxazosin.   Refills:  1                Procedures and tests performed during your visit     Shoulder XR, 2 view  left      Orders Needing Specimen Collection     None      Pending Results     Date and Time Order Name Status Description    12/3/2018 0843 Shoulder XR, 2 view left Preliminary             Pending Culture Results     No orders found from 12/1/2018 to 12/4/2018.            Pending Results Instructions     If you had any lab results that were not finalized at the time of your Discharge, you can call the ED Lab Result RN at [phone]. You will be contacted by this team for any positive Lab results or changes in treatment. The nurses are available 7 days a week from 10A to 6:30P.  You can leave a message 24 hours per day and they will return your call.        Test Results From Your Hospital Stay        12/3/2018  9:05 AM      Narrative     SHOULDER TWO VIEWS LEFT   12/3/2018 9:00 AM     HISTORY: Left shoulder pain.    COMPARISON: None.        Impression     IMPRESSION: Two views left shoulder. No fracture or dislocation. No  significant soft tissue swelling. Degenerative acromioclavicular joint  changes are noted with some inferior osteophytic spurring.                Thank you for choosing [facility]       Thank you for choosing [facility] for your care. Our goal is always to provide you with excellent care. Hearing back from our patients is one way we can continue to improve our services. Please take a few minutes to complete the written survey that you may receive in the mail after you visit with us. Thank you!        Care EveryWhere ID     This is your Care EveryWhere ID. This could be used by other organizations to access your [facility] medical records  [id number]        Equal Access to Services     TOMASA CAMPOS AH: Chandu Modi, salvatore dee, cristal arredondo. So [facility] [phone].    ATENCIÓN: Si habla español, tiene a king disposición servicios gratuitos de asistencia lingüística. Llame al [phone].    We comply with applicable federal  civil rights laws and [place] laws. We do not discriminate on the basis of race, color, national origin, age, disability, sex, sexual orientation, or gender identity.            After Visit Summary       This is your record. Keep this with you and show to your community pharmacist(s) and doctor(s) at your next visit.

## 2018-12-03 NOTE — PATIENT INSTRUCTIONS
Start physical therapy for your shoulder.  If no improvement, can consider MRI and orthopedics consult.  Try the medication twice daily for the pain.        Thank you for choosing [facility].  You may be receiving a survey in the mail from Tiara Singh regarding your visit today.  Please take a few minutes to complete and return the survey to let us know how we are doing.      Our Clinic hours are:  Mondays    7:20 am - 7 pm  Tues -  Fri  7:20 am - 5 pm    Clinic Phone: [phone]    The clinic lab opens at 7:30 am Mon - Fri and appointments are required.    [facility] [employer]  Ph. [phone]  Monday  8 am - 7pm  Tues - Fri 8 am - 5:30 pm

## 2018-12-03 NOTE — ED AVS SNAPSHOT
[facility]    [address] [address]    Phone:  [phone]    Fax:  [phone]                                       Phi Ortiz Jr.   MRN: [id number]    Department:  [facility]   Date of Visit:  12/3/2018           After Visit Summary Signature Page     I have received my discharge instructions, and my questions have been answered. I have discussed any challenges I see with this plan with the nurse or doctor.    ..........................................................................................................................................  Patient/Patient Representative Signature      ..........................................................................................................................................  Patient Representative Print Name and Relationship to Patient    ..................................................               ................................................  Date                                   Time    ..........................................................................................................................................  Reviewed by Signature/Title    ...................................................              ..............................................  Date                                               Time          22EPIC Rev 08/18

## 2018-12-03 NOTE — MR AVS SNAPSHOT
After Visit Summary   12/3/2018    Phi Ortiz Jr.    MRN: [id number]           Patient Information     Date Of Birth          1970        Visit Information        Provider Department      12/3/2018 1:20 PM Mckenna Caldwell APRN CNP [facility]        Today's Diagnoses     Acute pain of left shoulder    -  1      Care Instructions    Start physical therapy for your shoulder.  If no improvement, can consider MRI and orthopedics consult.  Try the medication twice daily for the pain.        Thank you for choosing [facility].  You may be receiving a survey in the mail from [employer] regarding your visit today.  Please take a few minutes to complete and return the survey to let us know how we are doing.      Our Clinic hours are:  Mondays    7:20 am - 7 pm  Tues -  Fri  7:20 am - 5 pm    Clinic Phone: [phone]    The clinic lab opens at 7:30 am Mon - Fri and appointments are required.    [facility]. [phone]  Monday  8 am - 7pm  Tues - Fri 8 am - 5:30 pm                 Follow-ups after your visit        Additional Services     PHYSICAL THERAPY REFERRAL       If you have not heard from the scheduling office within 2 business days, please call [phone] for all locations, with the exception of [place], please call [phone] and [place], please call [phone].    Please be aware that coverage of these services is subject to the terms and limitations of your health insurance plan.  Call member services at your health plan with any benefit or coverage questions.                  Follow-up notes from your care team     Return in about 2 weeks (around 12/17/2018) for or sooner if symptoms persist or worsen.      Future tests that were ordered for you today     Open Future Orders        Priority Expected Expires Ordered    PHYSICAL THERAPY REFERRAL Routine  12/3/2019 12/3/2018            Who to contact     If you have questions or  "need follow up information about today's clinic visit or your schedule please contact [facility] directly at [phone].  Normal or non-critical lab and imaging results will be communicated to you by MyChart, letter or phone within 4 business days after the clinic has received the results. If you do not hear from us within 7 days, please contact the clinic through MyChart or phone. If you have a critical or abnormal lab result, we will notify you by phone as soon as possible.  Submit refill requests through [employer] or call your pharmacy and they will forward the refill request to us. Please allow 3 business days for your refill to be completed.          Additional Information About Your Visit        Care EveryWhere ID     This is your Care EveryWhere ID. This could be used by other organizations to access your [place] medical records  [id number]        Your Vitals Were     Pulse Temperature Respirations Height Pulse Oximetry BMI (Body Mass Index)    56 98.3  F (36.8  C) (Oral) 16 5' 11\" (1.803 m) 98% 32.92 kg/m2       Blood Pressure from Last 3 Encounters:   12/03/18 120/78   12/03/18 (!) 142/97   11/16/18 123/79    Weight from Last 3 Encounters:   12/03/18 236 lb (107 kg)   12/03/18 236 lb (107 kg)   11/16/18 250 lb (113.4 kg)                 Today's Medication Changes          These changes are accurate as of 12/3/18  1:35 PM.  If you have any questions, ask your nurse or doctor.               Start taking these medicines.        Dose/Directions    diclofenac 75 MG EC tablet   Commonly known as:  VOLTAREN   Used for:  Acute pain of left shoulder   Started by:  Mckenna Caldwell APRN CNP        Dose:  75 mg   Take 1 tablet (75 mg) by mouth 2 times daily as needed for moderate pain   Quantity:  30 tablet   Refills:  0            Where to get your medicines      [place] [facility] [place] [address] [facility] [address]  [address], [facility] [address]     Phone:  [phone]     diclofenac 75 MG EC tablet                Primary Care Provider Office Phone # Fax #    MICHELLE Garcia [phone] [address]  [facility] [address]        Equal Access to Services     TOMASA CAMPOS AH: Hadii aad ku hadasho Soomaali, waaxda luqadaha, qaybta kaalmada adeegyada, waxay idiin hayaan adeeg kharash la'aan ah. So [facility] [phone].    ATENCIÓN: Si habla español, tiene a king disposición servicios gratuitos de asistencia lingüística. Llame al [phone].    We comply with applicable federal civil rights laws and [place] laws. We do not discriminate on the basis of race, color, national origin, age, disability, sex, sexual orientation, or gender identity.            Thank you!     Thank you for choosing [facility]  for your care. Our goal is always to provide you with excellent care. Hearing back from our patients is one way we can continue to improve our services. Please take a few minutes to complete the written survey that you may receive in the mail after your visit with us. Thank you!             Your Updated Medication List - Protect others around you: Learn how to safely use, store and throw away your medicines at www.disposemymeds.org.          This list is accurate as of 12/3/18  1:35 PM.  Always use your most recent med list.                   Brand Name Dispense Instructions for use Diagnosis    aspirin 81 MG EC tablet     360 tablet    Take 4 tablets (325 mg) by mouth daily    Type 2 diabetes mellitus without complication, without long-term current use of insulin (H)       diclofenac 75 MG EC tablet    VOLTAREN    30 tablet    Take 1 tablet (75 mg) by mouth 2 times daily as needed for moderate pain    Acute pain of left shoulder       order for DME      Phi was set up on [employer]  Type [employer] Auto (System One 60 series)  Serial Number: [id number] Modem Number:  [id number] Pressure: AUTO-TITRATE CPAP 7-12 cm H20 Mask of choice: AIRFIT F 10 FULL FACE MASK Size: MEDIUM HEATED TUBING AND MODEM PROVIDED        pantoprazole 40 MG EC tablet    PROTONIX    90 tablet    TAKE 1 TABLET BY MOUTH DAILY, 30 TO 60 MINUTES BEFORE A MEAL.    Gastroesophageal reflux disease, esophagitis presence not specified       rosuvastatin 40 MG tablet    CRESTOR    90 tablet    Take 1 tablet (40 mg) by mouth daily    Hyperlipidemia LDL goal <100       sildenafil 50 MG tablet    VIAGRA    6 tablet    Take 1 tablet (50 mg) by mouth daily as needed 30 min to 4 hrs before sex. Do not use with nitroglycerin, terazosin or doxazosin.    Erectile dysfunction, unspecified erectile dysfunction type

## 2018-12-03 NOTE — ED PROVIDER NOTES
History     Chief Complaint   Patient presents with     Shoulder Injury     left shoulder pain for 1 week now. Caught himself from falling, pain since     HPI  Phi Ortiz Jr. is a 48 year old male who presents with left shoulder pain that began 11/25 when coming down a ladder at work feet slipped, fell caught himself with his left arm, developed shoulder pain at that time which is persisted, described as an ache, radiates to upper arm, denies distal numbness weakness or paresthesia.  Pain is worse with movement specifically abduction past about 30-40 degrees.  He denies injury to the shoulder previously.    Problem List:    Patient Active Problem List    Diagnosis Date Noted     Urinary calculus, unspecified 10/02/2018     Priority: Medium     Overview:   Created by [facility] Annotation: Jan 18 2010  5:31PM Paulo Hollins: Right mid-ureteric   stone       Essential hypertension, benign 01/26/2016     Priority: Medium     overweight BMI greater than 35 10/12/2015     Priority: Medium     Mild major depression (H) 12/29/2014     Priority: Medium     Hyperlipidemia LDL goal <70 10/09/2014     Priority: Medium     Type 2 diabetes mellitus without complications (H) 10/09/2014     Priority: Medium     Chronic ischemic heart disease 04/22/2014     Priority: Medium     Chest pain 01/27/2014     Priority: Medium     Family history of coronary artery disease 04/18/2013     Priority: Medium     Father in 30's       Hypertriglyceridemia 04/18/2013     Priority: Medium     Drinks sodas       Vitamin D deficiency 04/18/2013     Priority: Medium     April 18, 2013   Has vit D deficiency -  Vitamin D 81799 IU a day for 8 weeks then  over the counter vit D supplement  2000iu daily thereafter       Problem list name updated by automated process. Provider to review and confirm  [employer] utility       Fatty liver 09/09/2010     Priority: Medium     All workup negative including hep titers and ultrasound positive  for this       GERD (gastroesophageal reflux disease) 08/24/2010     Priority: Medium     Generalized anxiety disorder 08/24/2010     Priority: Medium        Past Medical History:    Past Medical History:   Diagnosis Date     Anxiety      Chest pain 1/27/2014     GERD (gastroesophageal reflux disease)        Past Surgical History:    Past Surgical History:   Procedure Laterality Date     ESOPHAGOSCOPY, GASTROSCOPY, DUODENOSCOPY (EGD), COMBINED N/A 11/16/2018    Procedure: gastroscopy with biopsy and polypectomy;  Surgeon: Phi Tinoco MD;  Location: [facility]     NO HISTORY OF SURGERY         Family History:    Family History   Problem Relation Age of Onset     C.A.D. Father      heart attack 30's     Cerebrovascular Disease Father      Diabetes Father      Hypertension Father      Hypertension Mother      GASTROINTESTINAL DISEASE Mother      colitis     Diabetes Maternal Grandmother      snores     Dementia Maternal Grandmother      C.A.D. Maternal Grandfather      MI, leg circulation problems     C.A.D. Paternal Grandfather      MI in 30s, snores     Neurologic Disorder Sister      vertigo.  fibromyalgia     Hyperlipidemia Daughter      Breast Cancer No family hx of      Cancer - colorectal No family hx of      Prostate Cancer No family hx of        Social History:  Marital Status:   [2]  Social History   Substance Use Topics     Smoking status: Never Smoker     Smokeless tobacco: Former User     Alcohol use Yes      Comment: 24 pack a month, intermittent drinks based off the shift (1st, 2nd or 3rd) he's working that week.        Medications:      aspirin EC 81 MG EC tablet   ORDER FOR DME, SET TO FAX,   pantoprazole (PROTONIX) 40 MG EC tablet   rosuvastatin (CRESTOR) 40 MG tablet   sildenafil (VIAGRA) 50 MG tablet         Review of Systems  Problem focused review of systems otherwise negative    Physical Exam   BP: (!) 142/97  Heart Rate: 61  Temp: 98.2  F (36.8  C)  Resp: 16  Height: 180.3 cm (5'  "11\")  Weight: 107 kg (236 lb)  SpO2: 98 %      Physical Exam  Nontoxic-appearing no respiratory distress alert and oriented  Head atraumatic normal cephalic  Left shoulder shows some tenderness subacromial, there is no swelling redness or induration, he has pain with resisted abduction and weakness on the left, strength and sensation intact distally left upper extremity, pain worse with extension and internal rotation  ED Course     ED Course     Procedures               Critical Care time:  none               Results for orders placed or performed during the hospital encounter of 12/03/18 (from the past 24 hour(s))   Shoulder XR, 2 view left    Narrative    SHOULDER TWO VIEWS LEFT   12/3/2018 9:00 AM     HISTORY: Left shoulder pain.    COMPARISON: None.      Impression    IMPRESSION: Two views left shoulder. No fracture or dislocation. No  significant soft tissue swelling. Degenerative acromioclavicular joint  changes are noted with some inferior osteophytic spurring.       Medications - No data to display    Assessments & Plan (with Medical Decision Making)  48-year-old male left shoulder pain 1 week, details per HPI, findings consistent with impingement.  Recommend range of motion as tolerated, ibuprofen/Tylenol for discomfort, follow-up orthopedics.     I have reviewed the nursing notes.    I have reviewed the findings, diagnosis, plan and need for follow up with the patient.          Discharge Medication List as of 12/3/2018  9:13 AM          Final diagnoses:   Impingement syndrome of left shoulder       12/3/2018   [facility] EMERGENCY DEPARTMENT     Ra Buckner MD  12/03/18 0940    "

## 2018-12-03 NOTE — DISCHARGE INSTRUCTIONS
Shoulder Impingement Syndrome  The rotator cuff is a group of muscles and tendons that surround the shoulder joint. These muscles and tendons hold the arm in its joint. They help the shoulder move. The rotator cuff muscles and tendons can become irritated from repeated rubbing against the shoulder bone. This is called shoulder impingement syndrome or rotator cuff tendonitis.     If your case is mild, you may only need to rest the shoulder and then do certain exercises to strengthen the muscles. You can also take anti-inflammatory medicines. Steroid injections into the shoulder can ease inflammation. But you can have only a limited number of these. If the condition gets worse, your shoulder muscles may become thin and weak. This can lead to a rotator cuff tear.  Symptoms of shoulder impingement syndrome may include:    Shoulder pain that gets worse when you raise your arm overhead    Weakness of the shoulder muscles when you use your arm overhead    Popping and clicking when you move your shoulder    Shoulder pain that wakes you up at night, especially when you sleep on the affected shoulder    Sudden pain in your shoulder when you lift or reach  Home care  Follow these tips to take care of yourself at home:    Avoid activities that make your pain worse. These include raising your arms overhead, repeating the same motion over and over, or lifting heavy objects.    Don t hold your arm in one position for a long time. Keep it moving.    Put an ice pack on the sore area for 20 minutes every 1 to 2 hours for the first day. You can make an ice pack by putting ice cubes in a plastic bag. Wrap the bag in a towel before putting it on your shoulder. A frozen bag of peas or something similar can also be used as an ice pack. Use the ice packs 3 to 4 times a day for the next 2 days. Continue using the ice to relieve of pain and swelling as needed.    You may take acetaminophen or ibuprofen to control pain, unless another  medicine was prescribed. If prednisone was prescribed, don t take anti-inflammatory medicines. If you have chronic liver or kidney disease or ever had a stomach ulcer or gastrointestinal bleeding, talk with your doctor before using these medicines.    After your symptoms ease, you may get physical therapy or start a home exercise program. This can strengthen your shoulder muscles and help your range of motion. Talk with your doctor about what is best for your condition.  Follow-up care  Follow up with your healthcare provider, or as advised.  When to seek medical advice  Call your healthcare provider right away if any of these occur:    Shoulder pain that gets worse and wakes you up at night    Your shoulder or arm swells    Numbness, tingling, or pain that travels down the arm to the hand    Loss of shoulder strength    Fever or chills  Date Last Reviewed: 8/1/2016 2000-2018 [employer]. [address] [place] [address]. All rights reserved. This information is not intended as a substitute for professional medical care. Always follow your healthcare professional's instructions.      Work restriction for the next week.  Follow-up orthopedics for further evaluation.  Tylenol 1000 mg every 4 hours as needed,   ibuprofen 800 mg every 6 hours as needed.

## 2018-12-03 NOTE — PROGRESS NOTES
SUBJECTIVE:   Phi Ortiz Jr. is a 48 year old male who presents to clinic today for the following health issues:      ED/UC Followup:    Facility:  [facility]  Date of visit: 12/3/2018  Reason for visit: Impingement syndrome of left shoulder  Current Status: was told he needed to see his PCP.             Problem list and histories reviewed & adjusted, as indicated.  Additional history: was seen in ED earlier today for same complaint. X ray results below.  He was going down a ladder at work 8 days ago and lost his footing. He grabbed ladder and pulled his left shoulder out.  He denies any previous problems with his shoulder. Has been able to continue usual work duties at [employer].     Current Outpatient Prescriptions   Medication Sig Dispense Refill     aspirin EC 81 MG EC tablet Take 4 tablets (325 mg) by mouth daily 360 tablet 3     diclofenac (VOLTAREN) 75 MG EC tablet Take 1 tablet (75 mg) by mouth 2 times daily as needed for moderate pain 30 tablet 0     pantoprazole (PROTONIX) 40 MG EC tablet TAKE 1 TABLET BY MOUTH DAILY, 30 TO 60 MINUTES BEFORE A MEAL. 90 tablet 3     rosuvastatin (CRESTOR) 40 MG tablet Take 1 tablet (40 mg) by mouth daily 90 tablet 3     ORDER FOR DME, SET TO FAX, Phi was set up on [employer]  Type [employer] Auto (System One 60 series)  Serial Number: [id number] Modem Number: [id number] Pressure: AUTO-TITRATE CPAP 7-12 cm H20 Mask of choice: AIRFIT F 10 FULL FACE MASK Size: MEDIUM HEATED TUBING AND MODEM PROVIDED       sildenafil (VIAGRA) 50 MG tablet Take 1 tablet (50 mg) by mouth daily as needed 30 min to 4 hrs before sex. Do not use with nitroglycerin, terazosin or doxazosin. (Patient not taking: Reported on 12/3/2018) 6 tablet 1     Allergies   Allergen Reactions     Nka [No Known Allergies]      BP Readings from Last 3 Encounters:   12/03/18 120/78   12/03/18 (!) 142/97   11/16/18 123/79    Wt Readings from Last 3 Encounters:   12/03/18 236 lb (107 kg)  "  12/03/18 236 lb (107 kg)   11/16/18 250 lb (113.4 kg)                    Reviewed and updated as needed this visit by clinical staff  Tobacco  Allergies  Meds  Med Hx  Surg Hx  Fam Hx  Soc Hx      Reviewed and updated as needed this visit by Provider          ROS: 10 point ROS neg other than the symptoms noted above in the HPI.    OBJECTIVE:     /78 (BP Location: Right arm, Patient Position: Chair, Cuff Size: Adult Large)  Pulse 56  Temp 98.3  F (36.8  C) (Oral)  Resp 16  Ht 5' 11\" (1.803 m)  Wt 236 lb (107 kg)  SpO2 98%  BMI 32.92 kg/m2  Body mass index is 32.92 kg/(m^2).  GENERAL: healthy, alert and no distress  NECK: no adenopathy, no asymmetry  RESP: lungs clear to auscultation - no rales, rhonchi or wheezes  CV: regular rate and rhythm  MS: no gross musculoskeletal defects noted, FROM, pain with internal or external rotation and some weakness noted, mild pain over anterior shoulder      Diagnostic Test Results:  Results for orders placed or performed during the hospital encounter of 12/03/18 (from the past 24 hour(s))   Shoulder XR, 2 view left    Narrative    SHOULDER TWO VIEWS LEFT   12/3/2018 9:00 AM     HISTORY: Left shoulder pain.    COMPARISON: None.      Impression    IMPRESSION: Two views left shoulder. No fracture or dislocation. No  significant soft tissue swelling. Degenerative acromioclavicular joint  changes are noted with some inferior osteophytic spurring.    RADHA SCHWARZ MD       ASSESSMENT/PLAN:             1. Acute pain of left shoulder    - PHYSICAL THERAPY REFERRAL; Future  - diclofenac (VOLTAREN) 75 MG EC tablet; Take 1 tablet (75 mg) by mouth 2 times daily as needed for moderate pain  Dispense: 30 tablet; Refill: 0    See Patient Instructions  Patient Instructions   Start physical therapy for your shoulder.  If no improvement, can consider MRI and orthopedics consult.  Try the medication twice daily for the pain.        Thank you for choosing [facility].  You may " be receiving a survey in the mail from [employer] regarding your visit today.  Please take a few minutes to complete and return the survey to let us know how we are doing.      Our Clinic hours are:  Mondays    7:20 am - 7 pm  Tues -  Fri  7:20 am - 5 pm    Clinic Phone: [phone]    The clinic lab opens at 7:30 am Mon - Fri and appointments are required.    [facility]  Ph. [phone]  Monday  8 am - 7pm  Tues - Fri 8 am - 5:30 pm             MICHELLE Garcia [facility]

## 2018-12-03 NOTE — ED NOTES
Pt presents to ED with complaints of left shoulder pain for 1 week now. Pt reports he was about to fall and he caught himself with that arm and has had pain since. Pt notes it thought it would go away by now, but it isn't getting any better. Movement makes it worse.

## 2018-12-12 ENCOUNTER — HOSPITAL ENCOUNTER (OUTPATIENT)
Dept: PHYSICAL THERAPY | Facility: CLINIC | Age: 48
Setting detail: THERAPIES SERIES
End: 2018-12-12
Attending: NURSE PRACTITIONER
Payer: OTHER MISCELLANEOUS

## 2018-12-12 DIAGNOSIS — M25.512 ACUTE PAIN OF LEFT SHOULDER: ICD-10-CM

## 2018-12-12 PROCEDURE — 97110 THERAPEUTIC EXERCISES: CPT | Mod: GP | Performed by: PHYSICAL THERAPIST

## 2018-12-12 PROCEDURE — 97161 PT EVAL LOW COMPLEX 20 MIN: CPT | Mod: GP | Performed by: PHYSICAL THERAPIST

## 2018-12-12 PROCEDURE — 97140 MANUAL THERAPY 1/> REGIONS: CPT | Mod: GP | Performed by: PHYSICAL THERAPIST

## 2018-12-12 NOTE — PROGRESS NOTES
12/12/18 0700   General Information   Type of Visit Initial OP Ortho PT Evaluation   Start of Care Date 12/12/18   Referring Physician MICHELLE Gallardo, CNP   Patient/Family Goals Statement less pain   Orders Evaluate and Treat   Date of Order 12/03/18   Insurance Type Other   Insurance Comments/Visits Authorized Work Comp - email 2 weeks before 14th, or by 12/28/18   Medical Diagnosis Acute pain of left shoulder   Surgical/Medical history reviewed Yes   Precautions/Limitations no known precautions/limitations   Body Part(s)   Body Part(s) Shoulder   Presentation and Etiology   Pertinent history of current problem (include personal factors and/or comorbidities that impact the POC) 11/25/18 at work was climbing down a ladder, slipped and caught self with left arm noting immediate soreness. Pt notes that pain starts top of the shoulder, includes the biceps and has some forearm burning and the whole hand goes numb. Is aggravateed with lifting arm, or using arm away from the body.    Impairments A. Pain;B. Decreased WB tolerance;E. Decreased flexibility;F. Decreased strength and endurance;J. Burning;K. Numbness;L. Tingling;M. Locking or catching   Functional Limitations perform activities of daily living;perform required work activities;perform desired leisure / sports activities   Symptom Location L upper arm   How/Where did it occur With a fall;At work   Onset date of current episode/exacerbation 11/25/18   Chronicity New   Pain rating (0-10 point scale) Best (/10);Worst (/10)   Best (/10) 5   Worst (/10) 10   Pain quality A. Sharp;D. Burning;F. Stabbing   Frequency of pain/symptoms A. Constant   Pain/symptoms are: The same all the time   Pain/symptoms exacerbated by C. Lifting;D. Carrying;G. Certain positions;H. Overhead reach;I. Bending   Pain/symptoms eased by C. Rest   Progression of symptoms since onset: Unchanged   Current / Previous Interventions   Diagnostic Tests: X-ray   X-ray Results unremarkable    Current Level of Function   Patient role/employment history A. Employed   Employment Comments [employer]   Fall Risk Screen   Have you fallen 2 or more times in the past year? No   Have you fallen and had an injury in the past year? No   Is patient a fall risk? No   Shoulder Objective Findings   Side (if bilateral, select both right and left) Left   Observation R handed   Posture Forward head, rounded shoulders   Shoulder Special Tests Comments Deferred due to symptom severity   Palpation ttp infraspinatus, AC joint, lateral shoulder   Accessory Motion/Joint Mobility guarded GH mobility   Left Shoulder Flexion AROM 148   Left Shoulder Flexion PROM 120 (empty/guarded end feel)   Left Shoulder Abduction AROM 120   Left Shoulder Abduction PROM 108 (empty/guarded end feel)   Left Shoulder ER AROM 35   Left Shoulder ER PROM at 45* abd 60 (indicates stretch feel)   Left Shoulder IR AROM to hip, painful   Left Shoulder IR PROM at 45* abd 70   Left Shoulder Flexion Strength 3-/5, pain   Left Shoulder Abduction Strength 3/5, pain   Left Shoulder ER Strength 4/5   Left Shoulder IR Strength 4-/5   pain   Left Shoulder Extension Strength 4/5   Planned Therapy Interventions   Planned Therapy Interventions fine motor coordination training;joint mobilization;manual therapy;neuromuscular re-education;ROM;strengthening;stretching   Clinical Impression   Criteria for Skilled Therapeutic Interventions Met yes, treatment indicated   PT Diagnosis acute left shoulder pain   Influenced by the following impairments pain, decreased range of motion, decreased strength   Functional limitations due to impairments decreased participation lifting, carrying, ADLs, working overhead   Clinical Presentation Stable/Uncomplicated   Clinical Presentation Rationale acute shoulder pain, few comorbidities   Clinical Decision Making (Complexity) Low complexity   Therapy Frequency 2 times/Week   Predicted Duration of Therapy Intervention (days/wks) 6 weeks    Risk & Benefits of therapy have been explained Yes   Patient, Family & other staff in agreement with plan of care Yes   Education Assessment   Preferred Learning Style Demonstration;Pictures/video   Barriers to Learning No barriers   ORTHO GOALS   PT Ortho Eval Goals 1;2;3;4   Ortho Goal 1   Goal Identifier 1   Goal Description Patient will demonstrate 170* flexion and abduction for reaching overhead with <1/10 pain   Target Date 01/23/19   Ortho Goal 2   Goal Identifier 2   Goal Description Patient will be able to lift 20# floor to counter without pain for work duties   Target Date 01/23/19   Ortho Goal 3   Goal Identifier 3   Goal Description Patient will demonstrate 5/5 IR/ER/flexion shoulder strength for work duties   Target Date 01/23/19   Ortho Goal 4   Goal Identifier 4   Goal Description Patient will report SPADI score < 20% for MCID and improved tolerance to functional activitiy   Target Date 01/23/19   Total Evaluation Time   PT Eval, Low Complexity Minutes (70716) 20

## 2018-12-20 ENCOUNTER — HOSPITAL ENCOUNTER (OUTPATIENT)
Dept: PHYSICAL THERAPY | Facility: CLINIC | Age: 48
Setting detail: THERAPIES SERIES
End: 2018-12-20
Attending: NURSE PRACTITIONER
Payer: OTHER MISCELLANEOUS

## 2018-12-20 PROCEDURE — 97110 THERAPEUTIC EXERCISES: CPT | Mod: GP | Performed by: PHYSICAL THERAPIST

## 2018-12-24 ENCOUNTER — HOSPITAL ENCOUNTER (OUTPATIENT)
Dept: PHYSICAL THERAPY | Facility: CLINIC | Age: 48
Setting detail: THERAPIES SERIES
End: 2018-12-24
Attending: NURSE PRACTITIONER
Payer: OTHER MISCELLANEOUS

## 2018-12-24 DIAGNOSIS — M25.512 ACUTE PAIN OF LEFT SHOULDER: ICD-10-CM

## 2018-12-24 PROCEDURE — 97110 THERAPEUTIC EXERCISES: CPT | Mod: GP | Performed by: PHYSICAL THERAPIST

## 2018-12-24 PROCEDURE — 97140 MANUAL THERAPY 1/> REGIONS: CPT | Mod: GP | Performed by: PHYSICAL THERAPIST

## 2018-12-24 RX ORDER — DICLOFENAC SODIUM 75 MG/1
75 TABLET, DELAYED RELEASE ORAL 2 TIMES DAILY PRN
Qty: 30 TABLET | Refills: 0 | Status: SHIPPED | OUTPATIENT
Start: 2018-12-24 | End: 2021-02-17

## 2018-12-26 ENCOUNTER — TELEPHONE (OUTPATIENT)
Dept: FAMILY MEDICINE | Facility: CLINIC | Age: 48
End: 2018-12-26

## 2018-12-26 DIAGNOSIS — N52.9 ERECTILE DYSFUNCTION, UNSPECIFIED ERECTILE DYSFUNCTION TYPE: ICD-10-CM

## 2018-12-26 NOTE — TELEPHONE ENCOUNTER
"Requested Prescriptions   Pending Prescriptions Disp Refills     sildenafil (VIAGRA) 50 MG tablet [Pharmacy Med Name: SILDENAFIL CITRATE 50MG TABS]  Last Written Prescription Date:  10/19/18  Last Fill Quantity: 6,  # refills: 1   Last office visit: 12/3/2018 with prescribing provider:  Mckenna Caldwell   Future Office Visit:   Next 5 appointments (look out 90 days)    Dec 26, 2018  4:00 PM CST  Office Visit with Andrés Peraza MD  [facility] ([facility] [address] [place] [id number]  [phone]          6 tablet 1     Sig: TAKE ONE TABLET BY MOUTH DAILY AS NEEDED 30 MIN TO 4 HOURS BEFORE SEX. DO NOT USE WITH NITROGLYCERIN, TERAZOSIN OR DOXAZOSIN    Erectile Dysfuction Protocol Passed - 12/26/2018 11:11 AM       Passed - Absence of nitrates on medication list       Passed - Absence of Alpha Blockers on Med list       Passed - Recent (12 mo) or future (30 days) visit within the authorizing provider's specialty    Patient had office visit in the last 12 months or has a visit in the next 30 days with authorizing provider or within the authorizing provider's specialty.  See \"Patient Info\" tab in inbasket, or \"Choose Columns\" in Meds & Orders section of the refill encounter.             Passed - Patient is age 18 or older        "

## 2018-12-27 NOTE — TELEPHONE ENCOUNTER
Unable to LM - mailbox is full.  This was reported not taking 12-3-18.  Is he taking?  Ok to refill.  He will be due for appt March 2019    Nanda LARSON RN

## 2018-12-28 RX ORDER — SILDENAFIL 50 MG/1
TABLET, FILM COATED ORAL
Qty: 6 TABLET | Refills: 1 | Status: SHIPPED | OUTPATIENT
Start: 2018-12-28 | End: 2019-04-30

## 2018-12-31 ENCOUNTER — OFFICE VISIT (OUTPATIENT)
Dept: FAMILY MEDICINE | Facility: CLINIC | Age: 48
End: 2018-12-31
Payer: OTHER MISCELLANEOUS

## 2018-12-31 VITALS
BODY MASS INDEX: 33.46 KG/M2 | WEIGHT: 239 LBS | TEMPERATURE: 97.7 F | DIASTOLIC BLOOD PRESSURE: 86 MMHG | HEIGHT: 71 IN | RESPIRATION RATE: 16 BRPM | HEART RATE: 56 BPM | SYSTOLIC BLOOD PRESSURE: 120 MMHG

## 2018-12-31 DIAGNOSIS — M75.102 ROTATOR CUFF SYNDROME, LEFT: Primary | ICD-10-CM

## 2018-12-31 PROCEDURE — 99213 OFFICE O/P EST LOW 20 MIN: CPT | Performed by: FAMILY MEDICINE

## 2018-12-31 RX ORDER — OXYCODONE AND ACETAMINOPHEN 5; 325 MG/1; MG/1
TABLET ORAL
Qty: 20 TABLET | Refills: 0 | Status: SHIPPED | OUTPATIENT
Start: 2018-12-31 | End: 2019-01-17

## 2018-12-31 ASSESSMENT — MIFFLIN-ST. JEOR: SCORE: 1981.23

## 2018-12-31 NOTE — PROGRESS NOTES
"  SUBJECTIVE:   Phi Ortiz Jr. is a 48 year old male who presents to clinic today for the following health issues:      * Work Comp- DOI 11/25/18.  Left shoulder.  Has seen PT beginning of December and now doing exercises at home.  Not helping.  Having trouble sleeping.  Constant pain 5/10        S:Phi Ortiz Jr. is a 48 year old male with L shoulder pain after work injury a few weeks ago.      Therapy, nsaids, rest not helping.  Very hard to put arm behind back, lots of pain with overhead.  Doesn't feel things are improving at all, worried something is wrong    Xray initially OK.  No hx of shoulder injury    Problem list, med list, additional histories reviewed and updated, as indicated.      O:/86 (BP Location: Right arm, Patient Position: Chair, Cuff Size: Adult Large)   Pulse 56   Temp 97.7  F (36.5  C) (Tympanic)   Resp 16   Ht 1.803 m (5' 11\")   Wt 108.4 kg (239 lb)   BMI 33.33 kg/m    GEN: Alert and oriented, in no acute distress  Pain with supraspinatous testing.  No pain over AC joint.  Positive neers/negro    A: rotator cuff syndrome, L       P: given acute trauma, lack of improvement over weeks, time for an MRI.  Worried about significant tear    Will give 20 pain pills to help sleep.     If surgical problem on MRI ,to ortho.  If not, will be back for shot.  Back in a week.  Hasn't had restrictions formally at work,  \"I'm getting by, let's not get it complicated.\"        "

## 2018-12-31 NOTE — NURSING NOTE
"Chief Complaint   Patient presents with     Work Comp       Initial BP (!) 137/92 (BP Location: Right arm, Patient Position: Chair, Cuff Size: Adult Large)   Pulse 56   Temp 97.7  F (36.5  C) (Tympanic)   Resp 16   Ht 1.803 m (5' 11\")   Wt 108.4 kg (239 lb)   BMI 33.33 kg/m   Estimated body mass index is 33.33 kg/m  as calculated from the following:    Height as of this encounter: 1.803 m (5' 11\").    Weight as of this encounter: 108.4 kg (239 lb).    Patient presents to the clinic using No DME    Health Maintenance that is potentially due pending provider review:  NONE        Is there anyone who you would like to be able to receive your results? No  If yes have patient fill out NITISH      "

## 2019-01-03 ENCOUNTER — HOSPITAL ENCOUNTER (OUTPATIENT)
Dept: MRI IMAGING | Facility: CLINIC | Age: 49
Discharge: HOME | End: 2019-01-03
Attending: FAMILY MEDICINE | Admitting: FAMILY MEDICINE

## 2019-01-03 DIAGNOSIS — M75.102 ROTATOR CUFF SYNDROME, LEFT: ICD-10-CM

## 2019-01-03 PROCEDURE — 73221 MRI JOINT UPR EXTREM W/O DYE: CPT | Mod: LT

## 2019-01-04 ENCOUNTER — TELEPHONE (OUTPATIENT)
Dept: FAMILY MEDICINE | Facility: CLINIC | Age: 49
End: 2019-01-04

## 2019-01-04 NOTE — TELEPHONE ENCOUNTER
questions about work restrictions and MRI results will be working 3 shifts before he see appointment on 01.07.19 with Dr Stu Trevino CSS

## 2019-01-04 NOTE — TELEPHONE ENCOUNTER
Pt had MRI yesterday of shoulder. States he he works for [employer]labor intensive, lifting greater than 50 lbs, climbing ladders. Should he be on restrictions? See MRI. Works at 3 pm today Cadence Mcclure RN

## 2019-01-04 NOTE — TELEPHONE ENCOUNTER
MRI shows a good deal of tendonitis and he may well need to see orth for this since he is not improving.  For now he should continue with lifting restrictions to no more than 25 lbs.  Have him follow with Dr Peraza for next step. Earnest Jolley

## 2019-01-07 ENCOUNTER — OFFICE VISIT (OUTPATIENT)
Dept: FAMILY MEDICINE | Facility: CLINIC | Age: 49
End: 2019-01-07
Payer: OTHER MISCELLANEOUS

## 2019-01-07 VITALS
SYSTOLIC BLOOD PRESSURE: 130 MMHG | HEART RATE: 64 BPM | BODY MASS INDEX: 33.04 KG/M2 | HEIGHT: 71 IN | DIASTOLIC BLOOD PRESSURE: 78 MMHG | TEMPERATURE: 97.9 F | WEIGHT: 236 LBS

## 2019-01-07 DIAGNOSIS — S43.432D TEAR OF LEFT GLENOID LABRUM, SUBSEQUENT ENCOUNTER: Primary | ICD-10-CM

## 2019-01-07 PROCEDURE — 99213 OFFICE O/P EST LOW 20 MIN: CPT | Performed by: FAMILY MEDICINE

## 2019-01-07 ASSESSMENT — MIFFLIN-ST. JEOR: SCORE: 1967.62

## 2019-01-07 NOTE — PROGRESS NOTES
"  SUBJECTIVE:   Phi Ortiz Jr. is a 48 year old male who presents to clinic today for the following health issues:      Musculoskeletal problem/pain      Duration: 11/25/2018    Description  Location: left shoulder     Intensity:  moderate    Accompanying signs and symptoms: radiates to neck and arm     History  Previous similar problem: YES  Previous evaluation:  MRI - would like to discuss this day     Precipitating or alleviating factors:  Trauma or overuse: YES- work injury   Aggravating factors include: overuse    Therapies tried and outcome: physical therapy and percocet to help with pain during sleep, voltaren       s :Phi Ortiz Jr. is a 48 year old male with L shoulder pain.  2 months ago hurt at work.  MRI shows labral tears, maybe loose bodies in joint.  Biceps tendonitis too    Mild rotator cuff tendinosis as well    Problem list, med list, additional histories reviewed and updated, as indicated.      O:/78 (Cuff Size: Adult Large)   Pulse 64   Temp 97.9  F (36.6  C) (Tympanic)   Ht 1.803 m (5' 11\")   Wt 107 kg (236 lb)   BMI 32.92 kg/m    GEN: Alert and oriented, in no acute distress  Pain with any overhead motion.     A: L labral tear, shoulder       Biceps tendinitis    P: limits for work.  Ortho referral for clarifying best plan going forward, review MRI with him, etc.     It's been several weeks, not improving at all, so wonder if he's going to need some sort of intervention.  3 therapy sessions haven't helped either.      Back in 2 weeks.    "

## 2019-01-07 NOTE — LETTER
[facility]  [address] [place] [id number]  Phone: [phone]  Fax: [phone]      REPORT OF WORK ABILITY    NOTE TO EMPLOYEE: You must promptly provide a copy of this report to your  employer or worker's compensation insurer, and Qualified Rehabilitation Consultant.    Date: 1/7/2019                     Employee Name: Phi Ortiz Jr.         YOB: 1970  Medical Record Number: [id number]   Soc.Sec.No: [id number]  Employer: None                Date of Injury: nov 25th  Managed Care Organization / Insurance Company Name: UNKNOWN    Diagnosis: L shoulder tendonitis and labral tear   Work Related: yes     MMI: NO   Permanent Partial Disability(PPD) likely: UNKNOWN    EMPLOYEE IS ABLE TO WORK: with restrictions from today to 1/21/19 -  Full shift     RESTRICTIONS IF ANY:     Lift, carry no more than:  10 - 20 lb.Frequently (4-6 hours)    OTHER RESTRICTIONS: no use of L arm overhead.    No ladder climbing    TREATMENT PLAN/NOTES: MRI done, meds, seeing ortho.        Andrés Peraza MD

## 2019-01-17 ENCOUNTER — OFFICE VISIT (OUTPATIENT)
Dept: FAMILY MEDICINE | Facility: CLINIC | Age: 49
End: 2019-01-17
Payer: COMMERCIAL

## 2019-01-17 VITALS
SYSTOLIC BLOOD PRESSURE: 116 MMHG | BODY MASS INDEX: 31.92 KG/M2 | HEART RATE: 70 BPM | HEIGHT: 71 IN | TEMPERATURE: 97.8 F | OXYGEN SATURATION: 97 % | WEIGHT: 228 LBS | DIASTOLIC BLOOD PRESSURE: 82 MMHG

## 2019-01-17 DIAGNOSIS — F41.1 GAD (GENERALIZED ANXIETY DISORDER): ICD-10-CM

## 2019-01-17 DIAGNOSIS — I25.9 CHRONIC ISCHEMIC HEART DISEASE: ICD-10-CM

## 2019-01-17 DIAGNOSIS — I10 ESSENTIAL HYPERTENSION, BENIGN: Chronic | ICD-10-CM

## 2019-01-17 DIAGNOSIS — E11.9 TYPE 2 DIABETES MELLITUS WITHOUT COMPLICATION, WITHOUT LONG-TERM CURRENT USE OF INSULIN (H): Primary | Chronic | ICD-10-CM

## 2019-01-17 DIAGNOSIS — E78.5 HYPERLIPIDEMIA LDL GOAL <100: ICD-10-CM

## 2019-01-17 DIAGNOSIS — E55.9 VITAMIN D DEFICIENCY: ICD-10-CM

## 2019-01-17 LAB — HBA1C MFR BLD: 6.5 % (ref 0–5.6)

## 2019-01-17 PROCEDURE — 36415 COLL VENOUS BLD VENIPUNCTURE: CPT | Performed by: FAMILY MEDICINE

## 2019-01-17 PROCEDURE — 99214 OFFICE O/P EST MOD 30 MIN: CPT | Performed by: FAMILY MEDICINE

## 2019-01-17 PROCEDURE — 83036 HEMOGLOBIN GLYCOSYLATED A1C: CPT | Performed by: FAMILY MEDICINE

## 2019-01-17 RX ORDER — CITALOPRAM HYDROBROMIDE 20 MG/1
20 TABLET ORAL DAILY
Qty: 90 TABLET | Refills: 3 | Status: SHIPPED | OUTPATIENT
Start: 2019-01-17 | End: 2019-03-04

## 2019-01-17 RX ORDER — ROSUVASTATIN CALCIUM 40 MG/1
40 TABLET, COATED ORAL DAILY
Qty: 90 TABLET | Refills: 3 | Status: SHIPPED | OUTPATIENT
Start: 2019-01-17

## 2019-01-17 ASSESSMENT — PATIENT HEALTH QUESTIONNAIRE - PHQ9
SUM OF ALL RESPONSES TO PHQ QUESTIONS 1-9: 20
10. IF YOU CHECKED OFF ANY PROBLEMS, HOW DIFFICULT HAVE THESE PROBLEMS MADE IT FOR YOU TO DO YOUR WORK, TAKE CARE OF THINGS AT HOME, OR GET ALONG WITH OTHER PEOPLE: VERY DIFFICULT
SUM OF ALL RESPONSES TO PHQ QUESTIONS 1-9: 20

## 2019-01-17 ASSESSMENT — ANXIETY QUESTIONNAIRES
7. FEELING AFRAID AS IF SOMETHING AWFUL MIGHT HAPPEN: SEVERAL DAYS
GAD7 TOTAL SCORE: 16
GAD7 TOTAL SCORE: 16
4. TROUBLE RELAXING: NEARLY EVERY DAY
GAD7 TOTAL SCORE: 16
6. BECOMING EASILY ANNOYED OR IRRITABLE: NEARLY EVERY DAY
5. BEING SO RESTLESS THAT IT IS HARD TO SIT STILL: NEARLY EVERY DAY
1. FEELING NERVOUS, ANXIOUS, OR ON EDGE: MORE THAN HALF THE DAYS
2. NOT BEING ABLE TO STOP OR CONTROL WORRYING: MORE THAN HALF THE DAYS
3. WORRYING TOO MUCH ABOUT DIFFERENT THINGS: MORE THAN HALF THE DAYS

## 2019-01-17 ASSESSMENT — MIFFLIN-ST. JEOR: SCORE: 1931.33

## 2019-01-17 NOTE — PROGRESS NOTES
SUBJECTIVE:   hPi Ortiz Jr. is a 48 year old male who presents to clinic today for the following health issues:    PHQ-9 ([employer]) 1/17/2019   1.  Little interest or pleasure in doing things 3   2.  Feeling down, depressed, or hopeless 1   3.  Trouble falling or staying asleep, or sleeping too much 3   4.  Feeling tired or having little energy 3   5.  Poor appetite or overeating 3   6.  Feeling bad about yourself 2   7.  Trouble concentrating 3   8.  Moving slowly or restless 2   9.  Suicidal or self-harm thoughts 0   PHQ-9 Total Score 20     RITA-7   [employer], 2002; Used with Permission) 1/17/2019   1. Feeling nervous, anxious, or on edge 2   2. Not being able to stop or control worrying 2   3. Worrying too much about different things 2   4. Trouble relaxing 3   5. Being so restless that it is hard to sit still 3   6. Becoming easily annoyed or irritable 3   7. Feeling afraid, as if something awful might happen 1   RITA-7 Total Score 16   If you checked any problems, how difficult have they made it for you to do your work, take care of things at home, or get along with other people?          Depression and Anxiety Follow-Up    Status since last visit: Worsened     Other associated symptoms:chest discomfort    Complicating factors:     Significant life event: Yes-  Going thru divorce     Current substance abuse: None    PHQ 12/11/2017 10/9/2018 1/17/2019   PHQ-9 Total Score 3 3 20   Q9: Suicide Ideation Not at all Not at all Not at all     RITA-7 SCORE 5/24/2017 6/23/2017 1/17/2019   Total Score - - -   Total Score - - 16 (severe anxiety)   Total Score 15 20 16     S: Phi Ortiz Jr. is a 48 year old male with anxiety.  Worse dealing with shoulder pain, divorce.  Irritable for weeks.  Has been on celexa in past, wonders about getting back on.  Worked before    DM2: due for A1C recheck.  Was on metformin in past,not now due to 5.7 on last A1C    Htn; controlled with diet r ight now    Hyperlipidemia: was  "on statin, stopped.  Willing to get back on.     Problem list, med list, additional histories reviewed and updated, as indicated.      No  Cp or sob    O:/82 (BP Location: Right arm, Patient Position: Chair, Cuff Size: Adult Large)   Pulse 70   Temp 97.8  F (36.6  C) (Tympanic)   Ht 1.803 m (5' 11\")   Wt 103.4 kg (228 lb)   SpO2 97%   BMI 31.80 kg/m    GEN: Alert and oriented, in no acute distress  CV: RRR, no murmur  EXT: no edema or lesions noted in lower extremities    A: anxiety, worse      DM2, recheck       Hyperlipidemia, back on statin        Htn, stable    P: fills on some meds.  A1C.  Restart celexa.  20mg.      Self cares discussed.  Back in 6 wks.      Weight management plan: working on it, has been losing wt.             "

## 2019-01-17 NOTE — NURSING NOTE
"Chief Complaint   Patient presents with     Anxiety     Depression       Initial /82 (BP Location: Right arm, Patient Position: Chair, Cuff Size: Adult Large)   Pulse 70   Temp 97.8  F (36.6  C) (Tympanic)   Ht 1.803 m (5' 11\")   Wt 103.4 kg (228 lb)   SpO2 97%   BMI 31.80 kg/m   Estimated body mass index is 31.8 kg/m  as calculated from the following:    Height as of this encounter: 1.803 m (5' 11\").    Weight as of this encounter: 103.4 kg (228 lb).    Patient presents to the clinic using No DME    Health Maintenance that is potentially due pending provider review:  NONE    n/a    Is there anyone who you would like to be able to receive your results? No  If yes have patient fill out NITISH    "

## 2019-01-17 NOTE — LETTER
January 17, 2019      Phi Ortiz Jr.  [address] [place] [address]        Dear ,    We are writing to inform you of your test results.    Diabetes has progressed some off the metformin.  We'll keep an eye on it, if you keep dropping some weight, it should come back down under 6.0, hopefully.    I hope the new medication is helping.    Resulted Orders   Hemoglobin A1c   Result Value Ref Range    Hemoglobin A1C 6.5 (H) 0 - 5.6 %      Comment:      Normal <5.7% Prediabetes 5.7-6.4%  Diabetes 6.5% or higher - adopted from ADA   consensus guidelines.         If you have any questions or concerns, please call the clinic at the number listed above.       Sincerely,        Andrés Peraza MD/michaela

## 2019-01-18 ASSESSMENT — PATIENT HEALTH QUESTIONNAIRE - PHQ9: SUM OF ALL RESPONSES TO PHQ QUESTIONS 1-9: 20

## 2019-01-18 ASSESSMENT — ANXIETY QUESTIONNAIRES: GAD7 TOTAL SCORE: 16

## 2019-02-18 ENCOUNTER — TRANSFERRED RECORDS (OUTPATIENT)
Dept: HEALTH INFORMATION MANAGEMENT | Facility: CLINIC | Age: 49
End: 2019-02-18

## 2019-02-25 ENCOUNTER — TELEPHONE (OUTPATIENT)
Dept: FAMILY MEDICINE | Facility: CLINIC | Age: 49
End: 2019-02-25

## 2019-02-25 ENCOUNTER — HOSPITAL ENCOUNTER (OUTPATIENT)
Dept: MRI IMAGING | Facility: CLINIC | Age: 49
Discharge: HOME | End: 2019-02-25
Attending: ORTHOPAEDIC SURGERY | Admitting: ORTHOPAEDIC SURGERY
Payer: OTHER MISCELLANEOUS

## 2019-02-25 DIAGNOSIS — M25.519 SHOULDER PAIN: ICD-10-CM

## 2019-02-25 PROCEDURE — 72141 MRI NECK SPINE W/O DYE: CPT

## 2019-02-25 NOTE — TELEPHONE ENCOUNTER
Reason for call:  Patient reporting a symptom    Symptom or request: Anxiety- Pt still continues to grit his teeth and feel like he can't just sit still. Pt was placed on Citalopram. Feels the dose is not enough. Pt feels it is not working.  Pt has not been on it for a month.  LM if does not answer. Pt is at work. If needs appt let know.     Have you been treated for this before? Yes      Phone Number patient can be reached at:  Home number on file [phone] (home)    Best Time:  Any Time      Can we leave a detailed message on this number:  YES    Call taken on 2/25/2019 at 1:48 PM by Selene Gibson

## 2019-02-25 NOTE — TELEPHONE ENCOUNTER
Pt called. States he is going through a divorce and feel his anxiety isn't well controlled at this time. States he thinks medication needs to be changed or increased. Offered appointment for this week and declined stating he has to work. Pt agreed to appointment in 1 week and states he will wait to see provider before making changes. Cadence Mcclure RN

## 2019-02-27 NOTE — ADDENDUM NOTE
Encounter addended by: Natacha Taylor PT on: 2/27/2019 8:46 AM   Actions taken: Sign clinical note, Flowsheet accepted, Episode resolved

## 2019-02-27 NOTE — PROGRESS NOTES
Outpatient Physical Therapy Discharge Note     Patient: Phi Ortiz Jr.  : 1970    Beginning/End Dates of Reporting Period:  18 to 2019    Referring Provider: Mckenna Pichardo Diagnosis: acute left shoulder pain     Client Self Report: Pt reports L shoulder continues to be painful. Some numbness into hand. No diffeerent after resting the weekend from work.     Objective Measurements:  Objective Measure: L shoulder AROM  Details: Flexion 131, abduction 110  Objective Measure: L shoulder MMT  Details: ER 4/5, IR 4/5, pain        Goals:  Goal Identifier 1   Goal Description Patient will demonstrate 170* flexion and abduction for reaching overhead with <1/10 pain   Target Date 19   Date Met      Progress:     Goal Identifier 2   Goal Description Patient will be able to lift 20# floor to counter without pain for work duties   Target Date 19   Date Met      Progress:     Goal Identifier 3   Goal Description Patient will demonstrate 5/5 IR/ER/flexion shoulder strength for work duties   Target Date 19   Date Met      Progress:     Goal Identifier 4   Goal Description Patient will report SPADI score < 20% for MCID and improved tolerance to functional activitiy   Target Date 19   Date Met      Progress:       Progress Toward Goals:   Progress this reporting period: Patient did not return for follow up treatments as directed. Per chart review pt following up with ortho specialist. Goal status and current objective information is therefore unknown.  Discharge from PT services at this time for this episode of treatment. Please see attached documentation under this episode of care for further information including dates of service, start of care date, referring physician, Dx, treatment plan, treatments, etc.    Please contact me with any questions or concerns.    Thank you for your referral.    Natacha Taylor, PT, DPT  Physical Therapist   [facility]  [facility]  [phone]

## 2019-03-04 ENCOUNTER — OFFICE VISIT (OUTPATIENT)
Dept: FAMILY MEDICINE | Facility: CLINIC | Age: 49
End: 2019-03-04
Payer: COMMERCIAL

## 2019-03-04 VITALS
SYSTOLIC BLOOD PRESSURE: 118 MMHG | WEIGHT: 227 LBS | HEART RATE: 76 BPM | DIASTOLIC BLOOD PRESSURE: 68 MMHG | RESPIRATION RATE: 16 BRPM | TEMPERATURE: 97.5 F | BODY MASS INDEX: 31.66 KG/M2

## 2019-03-04 DIAGNOSIS — F41.1 GENERALIZED ANXIETY DISORDER: Primary | Chronic | ICD-10-CM

## 2019-03-04 PROCEDURE — 99213 OFFICE O/P EST LOW 20 MIN: CPT | Performed by: FAMILY MEDICINE

## 2019-03-04 RX ORDER — CITALOPRAM HYDROBROMIDE 40 MG/1
40 TABLET ORAL DAILY
Qty: 90 TABLET | Refills: 3 | Status: SHIPPED | OUTPATIENT
Start: 2019-03-04 | End: 2019-07-29

## 2019-03-04 RX ORDER — LORAZEPAM 1 MG/1
TABLET ORAL
Qty: 15 TABLET | Refills: 0 | Status: SHIPPED | OUTPATIENT
Start: 2019-03-04

## 2019-03-04 ASSESSMENT — ANXIETY QUESTIONNAIRES
7. FEELING AFRAID AS IF SOMETHING AWFUL MIGHT HAPPEN: SEVERAL DAYS
4. TROUBLE RELAXING: NEARLY EVERY DAY
3. WORRYING TOO MUCH ABOUT DIFFERENT THINGS: NEARLY EVERY DAY
1. FEELING NERVOUS, ANXIOUS, OR ON EDGE: NEARLY EVERY DAY
2. NOT BEING ABLE TO STOP OR CONTROL WORRYING: NEARLY EVERY DAY
7. FEELING AFRAID AS IF SOMETHING AWFUL MIGHT HAPPEN: SEVERAL DAYS
5. BEING SO RESTLESS THAT IT IS HARD TO SIT STILL: NEARLY EVERY DAY
GAD7 TOTAL SCORE: 17
6. BECOMING EASILY ANNOYED OR IRRITABLE: SEVERAL DAYS

## 2019-03-04 ASSESSMENT — PATIENT HEALTH QUESTIONNAIRE - PHQ9
10. IF YOU CHECKED OFF ANY PROBLEMS, HOW DIFFICULT HAVE THESE PROBLEMS MADE IT FOR YOU TO DO YOUR WORK, TAKE CARE OF THINGS AT HOME, OR GET ALONG WITH OTHER PEOPLE: VERY DIFFICULT
SUM OF ALL RESPONSES TO PHQ QUESTIONS 1-9: 8
SUM OF ALL RESPONSES TO PHQ QUESTIONS 1-9: 8

## 2019-03-04 NOTE — PROGRESS NOTES
SUBJECTIVE:   Pih Ortiz Jr. is a 48 year old male who presents to clinic today for the following health issues:      Depression and Anxiety Follow-Up    Status since last visit: does not feel like Celexa is helping anxiety.     Other associated symptoms: Crabby on the medication and not sleeping well    Complicating factors:     Significant life event: No     Current substance abuse: None    PHQ 10/9/2018 1/17/2019 3/4/2019   PHQ-9 Total Score 3 20 8   Q9: Suicide Ideation Not at all Not at all Not at all     RITA-7 SCORE 6/23/2017 1/17/2019 3/4/2019   Total Score - - -   Total Score - 16 (severe anxiety) 17 (severe anxiety)   Total Score 20 16 17       PHQ-9  English  PHQ-9   Any Language  RITA-7  Suicide Assessment Five-step Evaluation and Treatment (SAFE-T)          S: Phi Ortiz Jr. is a 48 year old male with anxiety.  Going through divorce.  celexa 20mg not helping too much.  Wonders about making a change.    Sometimes will have acute panicky feeling as well    Problem list, med list, additional histories reviewed and updated, as indicated.      O:/68 (BP Location: Right arm, Patient Position: Chair, Cuff Size: Adult Large)   Pulse 76   Temp 97.5  F (36.4  C) (Tympanic)   Resp 16   Wt 103 kg (227 lb)   BMI 31.66 kg/m    GEN: Alert and oriented, in no acute distress      A: anxiety, not controlled    P: fills, but up to 40mg on celexa.  1mg ativan, 15 of them, to be used sparingly if panic attack.     F/u in a month or two.  Earlier if not working.

## 2019-03-04 NOTE — NURSING NOTE
"Chief Complaint   Patient presents with     Depression       Initial /68 (BP Location: Right arm, Patient Position: Chair, Cuff Size: Adult Large)   Pulse 76   Temp 97.5  F (36.4  C) (Tympanic)   Resp 16   Wt 103 kg (227 lb)   BMI 31.66 kg/m   Estimated body mass index is 31.66 kg/m  as calculated from the following:    Height as of 1/17/19: 1.803 m (5' 11\").    Weight as of this encounter: 103 kg (227 lb).    Patient presents to the clinic using No DME    Health Maintenance that is potentially due pending provider review:  NONE    Andreea Page MA  4:03 PM 3/4/2019  .    "

## 2019-03-05 ASSESSMENT — ANXIETY QUESTIONNAIRES: GAD7 TOTAL SCORE: 17

## 2019-03-05 ASSESSMENT — PATIENT HEALTH QUESTIONNAIRE - PHQ9: SUM OF ALL RESPONSES TO PHQ QUESTIONS 1-9: 8

## 2019-03-19 ENCOUNTER — TELEPHONE (OUTPATIENT)
Dept: PALLIATIVE MEDICINE | Facility: CLINIC | Age: 49
End: 2019-03-19

## 2019-03-19 NOTE — TELEPHONE ENCOUNTER
Faxed completed PA form and supporting documentation for RIGHT TFCESI to .  Right fax confirmation          Lia SILVEIRA    [facility]

## 2019-03-19 NOTE — TELEPHONE ENCOUNTER
Received 3- at 10:51 AM    Incoming fax from [facility] (Dr. Josh Crook at [facility]) for a Right C5 transforaminal ADDY for should pain. Order indicates Work [employer] insurance as billing group.   [facility]'s phone: [phone]   [facility]'s fax: [phone]  Routing to the  (SG) for prior auth from work comp. Please note FM has 2 WC guarantors and 2 WC claims (doi 9- left knee & doi 1- left shoulder) with [employer] as employer.         Gabriela Shallotte  Patient Representative  [facility] [place]

## 2019-03-19 NOTE — TELEPHONE ENCOUNTER
Received 3- at 3:47 PM    [facility] faxed progress notes for dos 2-. Progress notes given to  (SG) for review for work comp prior auth.    Gabriela Cassadaga  Patient Representative  [facility] [place]

## 2019-04-08 ENCOUNTER — TRANSFERRED RECORDS (OUTPATIENT)
Dept: HEALTH INFORMATION MANAGEMENT | Facility: CLINIC | Age: 49
End: 2019-04-08

## 2019-04-08 NOTE — TELEPHONE ENCOUNTER
Call received from [facility] stating that they faxed over a change to the previous order they had sent us. Fax located and confirmed the procedure they would like done is a Left C5-6 Transforaminal Steroid Injection. Routing for adjustment to  PA request.         Carie Torres    [facility]

## 2019-04-10 NOTE — TELEPHONE ENCOUNTER
Re-faxed PA request to [facility], right faxed confirmed      Lia SILVEIRA    [facility]

## 2019-04-12 NOTE — TELEPHONE ENCOUNTER
4-    Left C5-6 transforaminal ADDY order was received. Duplicate - it's already received and clinic's already working on it. Duplicate order has been placed in Shred-It bin.    Gabriela Gheens  Patient Representative  [facility] [place]

## 2019-04-17 ENCOUNTER — OFFICE VISIT (OUTPATIENT)
Dept: FAMILY MEDICINE | Facility: CLINIC | Age: 49
End: 2019-04-17
Payer: COMMERCIAL

## 2019-04-17 VITALS
RESPIRATION RATE: 16 BRPM | HEART RATE: 56 BPM | BODY MASS INDEX: 31.5 KG/M2 | TEMPERATURE: 96.8 F | HEIGHT: 71 IN | SYSTOLIC BLOOD PRESSURE: 122 MMHG | DIASTOLIC BLOOD PRESSURE: 80 MMHG | WEIGHT: 225 LBS

## 2019-04-17 DIAGNOSIS — F32.0 MILD MAJOR DEPRESSION (H): Primary | Chronic | ICD-10-CM

## 2019-04-17 DIAGNOSIS — F41.1 GENERALIZED ANXIETY DISORDER: Chronic | ICD-10-CM

## 2019-04-17 DIAGNOSIS — G47.33 OSA (OBSTRUCTIVE SLEEP APNEA): ICD-10-CM

## 2019-04-17 PROCEDURE — 99214 OFFICE O/P EST MOD 30 MIN: CPT | Performed by: FAMILY MEDICINE

## 2019-04-17 ASSESSMENT — ANXIETY QUESTIONNAIRES
3. WORRYING TOO MUCH ABOUT DIFFERENT THINGS: SEVERAL DAYS
5. BEING SO RESTLESS THAT IT IS HARD TO SIT STILL: NEARLY EVERY DAY
GAD7 TOTAL SCORE: 11
2. NOT BEING ABLE TO STOP OR CONTROL WORRYING: SEVERAL DAYS
4. TROUBLE RELAXING: NEARLY EVERY DAY
1. FEELING NERVOUS, ANXIOUS, OR ON EDGE: SEVERAL DAYS
6. BECOMING EASILY ANNOYED OR IRRITABLE: SEVERAL DAYS
7. FEELING AFRAID AS IF SOMETHING AWFUL MIGHT HAPPEN: SEVERAL DAYS
7. FEELING AFRAID AS IF SOMETHING AWFUL MIGHT HAPPEN: SEVERAL DAYS
GAD7 TOTAL SCORE: 11
GAD7 TOTAL SCORE: 11

## 2019-04-17 ASSESSMENT — PATIENT HEALTH QUESTIONNAIRE - PHQ9
10. IF YOU CHECKED OFF ANY PROBLEMS, HOW DIFFICULT HAVE THESE PROBLEMS MADE IT FOR YOU TO DO YOUR WORK, TAKE CARE OF THINGS AT HOME, OR GET ALONG WITH OTHER PEOPLE: VERY DIFFICULT
SUM OF ALL RESPONSES TO PHQ QUESTIONS 1-9: 13
SUM OF ALL RESPONSES TO PHQ QUESTIONS 1-9: 13

## 2019-04-17 ASSESSMENT — MIFFLIN-ST. JEOR: SCORE: 1917.72

## 2019-04-17 NOTE — PROGRESS NOTES
"  SUBJECTIVE:   Phi Ortiz Jr. is a 48 year old male who presents to clinic today for the following   health issues:      Depression and Anxiety Follow-Up    Status since last visit: Feeling lots of fatigue. \"scatter brain\". Has noticed shaking in hands since starting the Celexa    Other associated symptoms:None    Complicating factors:     Significant life event: Yes-  Pinched nerves in neck     Current substance abuse: None    PHQ 1/17/2019 3/4/2019 4/17/2019   PHQ-9 Total Score 20 8 13   Q9: Thoughts of better off dead/self-harm past 2 weeks Not at all Not at all Not at all     RITA-7 SCORE 1/17/2019 3/4/2019 4/17/2019   Total Score - - -   Total Score 16 (severe anxiety) 17 (severe anxiety) 11 (moderate anxiety)   Total Score 16 17 11       PHQ-9  English  PHQ-9   Any Language  RITA-7  Suicide Assessment Five-step Evaluation and Treatment (SAFE-T)      s :Phi Ortiz Jr. is a 48 year old male with with depression.  Improved some on higher dose celexa, less stress/anxiety.  Some \"scatter brained\" feeling at times, and fatigue.  Does have untreated severe sleep apnea, so hard to sort that out.     Problem list, med list, additional histories reviewed and updated, as indicated.      No sob or fever    O:/80 (BP Location: Right arm, Patient Position: Chair, Cuff Size: Adult Large)   Pulse 56   Temp 96.8  F (36  C) (Tympanic)   Resp 16   Ht 1.803 m (5' 11\")   Wt 102.1 kg (225 lb)   BMI 31.38 kg/m    Well groomed, dressed appropriately. Good eye contact.  No abnormal motor movements, oriented x3, speaks clearly with a normal rate and volume.  Thoughts are coherent and linear.  No tangential responses or loose associatons.  No obsessive behaviors discussed or observed, no suicidal thoughts.   Responds to questions appropriately, gives detailed answers.   Attention and concentration normal.  Affect WNL.  Insight and judgment appropriate.       A: generalized anxiety, some improvement      " Depression, some improvement        Sleep apnea, not treating.    P: continue for now.  He agrees.  Work on self cares.  Back in a few months to recheck.      Long talk on apnea, treating, improvement in mood/fatigue, etc.  He knows, will think about it .  Has machine at home.

## 2019-04-17 NOTE — NURSING NOTE
"Chief Complaint   Patient presents with     Depression       Initial /80 (BP Location: Right arm, Patient Position: Chair, Cuff Size: Adult Large)   Pulse 56   Temp 96.8  F (36  C) (Tympanic)   Resp 16   Ht 1.803 m (5' 11\")   Wt 102.1 kg (225 lb)   BMI 31.38 kg/m   Estimated body mass index is 31.38 kg/m  as calculated from the following:    Height as of this encounter: 1.803 m (5' 11\").    Weight as of this encounter: 102.1 kg (225 lb).    Patient presents to the clinic using No DME    Health Maintenance that is potentially due pending provider review:  NONE    Andreea Page MA  9:55 AM 4/17/2019  .        "

## 2019-04-18 ASSESSMENT — ANXIETY QUESTIONNAIRES: GAD7 TOTAL SCORE: 11

## 2019-04-18 ASSESSMENT — PATIENT HEALTH QUESTIONNAIRE - PHQ9: SUM OF ALL RESPONSES TO PHQ QUESTIONS 1-9: 13

## 2019-04-30 DIAGNOSIS — N52.9 ERECTILE DYSFUNCTION, UNSPECIFIED ERECTILE DYSFUNCTION TYPE: ICD-10-CM

## 2019-04-30 RX ORDER — SILDENAFIL 50 MG/1
TABLET, FILM COATED ORAL
Qty: 6 TABLET | Refills: 11 | Status: SHIPPED | OUTPATIENT
Start: 2019-04-30

## 2019-04-30 NOTE — TELEPHONE ENCOUNTER
"Requested Prescriptions   Pending Prescriptions Disp Refills     sildenafil (VIAGRA) 50 MG tablet 6 tablet 1       Erectile Dysfuction Protocol Passed - 4/30/2019 10:34 AM        Passed - Absence of nitrates on medication list        Passed - Absence of Alpha Blockers on Med list        Passed - Recent (12 mo) or future (30 days) visit within the authorizing provider's specialty     Patient had office visit in the last 12 months or has a visit in the next 30 days with authorizing provider or within the authorizing provider's specialty.  See \"Patient Info\" tab in inbasket, or \"Choose Columns\" in Meds & Orders section of the refill encounter.              Passed - Medication is active on med list        Passed - Patient is age 18 or older        Last Written Prescription Date:  12/28/18  Last Fill Quantity: 6,  # refills: 1   Last office visit: 4/17/2019 with prescribing provider:  Stu   Future Office Visit:      "

## 2019-05-08 ENCOUNTER — OFFICE VISIT (OUTPATIENT)
Dept: FAMILY MEDICINE | Facility: CLINIC | Age: 49
End: 2019-05-08
Payer: COMMERCIAL

## 2019-05-08 VITALS
DIASTOLIC BLOOD PRESSURE: 58 MMHG | HEIGHT: 71 IN | OXYGEN SATURATION: 97 % | WEIGHT: 230 LBS | RESPIRATION RATE: 16 BRPM | TEMPERATURE: 97 F | HEART RATE: 94 BPM | BODY MASS INDEX: 32.2 KG/M2 | SYSTOLIC BLOOD PRESSURE: 110 MMHG

## 2019-05-08 DIAGNOSIS — H65.93 BILATERAL NON-SUPPURATIVE OTITIS MEDIA: Primary | ICD-10-CM

## 2019-05-08 PROCEDURE — 99213 OFFICE O/P EST LOW 20 MIN: CPT | Performed by: NURSE PRACTITIONER

## 2019-05-08 ASSESSMENT — MIFFLIN-ST. JEOR: SCORE: 1940.4

## 2019-05-08 NOTE — PROGRESS NOTES
SUBJECTIVE:   Phi Ortiz Jr. is a 48 year old male who presents to clinic today for the following   health issues:        URI      Duration: 2 days     Description (location/character/radiation): URI    Intensity:  severe    Accompanying signs and symptoms: runny nose, congestion, sore throat, bilateral ear pain, headache, sinus pressure, fatigue, body aches     History (similar episodes/previous evaluation): works in a very [place] environment.    Precipitating or alleviating factors: None    Therapies tried and outcome: antihistamine           Additional history:     Reviewed  and updated as needed this visit by clinical staff  Tobacco  Allergies  Meds  Problems  Med Hx  Surg Hx  Fam Hx  Soc Hx          Reviewed and updated as needed this visit by Provider  Tobacco  Allergies  Meds  Problems  Med Hx  Surg Hx  Fam Hx         Patient Active Problem List   Diagnosis     GERD (gastroesophageal reflux disease)     Generalized anxiety disorder     Fatty liver     Family history of coronary artery disease     Vitamin D deficiency     Chronic ischemic heart disease     Hyperlipidemia LDL goal <70     Type 2 diabetes mellitus without complications (H)     Mild major depression (H)     Essential hypertension, benign     Urinary calculus, unspecified     Tear of left glenoid labrum, subsequent encounter     KERWIN (obstructive sleep apnea)     Past Surgical History:   Procedure Laterality Date     ESOPHAGOSCOPY, GASTROSCOPY, DUODENOSCOPY (EGD), COMBINED N/A 11/16/2018    Procedure: gastroscopy with biopsy and polypectomy;  Surgeon: Phi Tinoco MD;  Location: [facility]     NO HISTORY OF SURGERY         Social History     Tobacco Use     Smoking status: Never Smoker     Smokeless tobacco: Former User   Substance Use Topics     Alcohol use: Yes     Comment: 24 pack a month, intermittent drinks based off the shift (1st, 2nd or 3rd) he's working that week.     Family History   Problem Relation Age of Onset      XIOMARA Father         heart attack 30's     Cerebrovascular Disease Father      Diabetes Father      Hypertension Father      Hypertension Mother      Gastrointestinal Disease Mother         colitis     Diabetes Maternal Grandmother         snores     Dementia Maternal Grandmother      JOAO. Maternal Grandfather         MI, leg circulation problems     JOAO. Paternal Grandfather         MI in 30s, snores     Neurologic Disorder Sister         vertigo.  fibromyalgia     Hyperlipidemia Daughter      Breast Cancer No family hx of      Cancer - colorectal No family hx of      Prostate Cancer No family hx of          Current Outpatient Medications   Medication Sig Dispense Refill     amoxicillin-clavulanate (AUGMENTIN) 875-125 MG tablet Take 1 tablet by mouth 2 times daily for 10 days 20 tablet 0     aspirin EC 81 MG EC tablet Take 4 tablets (325 mg) by mouth daily 360 tablet 3     citalopram (CELEXA) 40 MG tablet Take 1 tablet (40 mg) by mouth daily 90 tablet 3     pantoprazole (PROTONIX) 40 MG EC tablet TAKE 1 TABLET BY MOUTH DAILY, 30 TO 60 MINUTES BEFORE A MEAL. 90 tablet 3     rosuvastatin (CRESTOR) 40 MG tablet Take 1 tablet (40 mg) by mouth daily 90 tablet 3     diclofenac (VOLTAREN) 75 MG EC tablet Take 1 tablet (75 mg) by mouth 2 times daily as needed for moderate pain (Patient not taking: Reported on 5/8/2019) 30 tablet 0     LORazepam (ATIVAN) 1 MG tablet Take one daily as needed for anxiety (Patient not taking: Reported on 4/17/2019) 15 tablet 0     ORDER FOR DME, SET TO FAX, Phi was set up on [employer]  Type [employer] Auto (System One 60 series)  Serial Number: [id number] Modem Number: [id number] Pressure: AUTO-TITRATE CPAP 7-12 cm H20 Mask of choice: AIRFIT F 10 FULL FACE MASK Size: MEDIUM HEATED TUBING AND MODEM PROVIDED       sildenafil (VIAGRA) 50 MG tablet TAKE ONE TABLET BY MOUTH DAILY AS NEEDED 30 MIN TO 4 HOURS BEFORE SEX. DO NOT USE WITH NITROGLYCERIN, TERAZOSIN OR DOXAZOSIN  "(Patient not taking: Reported on 5/8/2019) 6 tablet 11     Allergies   Allergen Reactions     Nka [No Known Allergies]      Labs reviewed in EPIC    ROS:  Constitutional, HEENT, cardiovascular, pulmonary, GI, , musculoskeletal, neuro, skin, endocrine and psych systems are negative, except as otherwise noted.    OBJECTIVE:     /58   Pulse 94   Temp 97  F (36.1  C) (Tympanic)   Resp 16   Ht 1.803 m (5' 11\")   Wt 104.3 kg (230 lb)   SpO2 97%   BMI 32.08 kg/m    Body mass index is 32.08 kg/m .   GENERAL: healthy, alert and no distress, nontoxic in appearance  EYES: Eyes grossly normal to inspection, PERRL and conjunctivae and sclerae normal  HENT: ear canals and TM's intact bilaterally with both red with effusion, nose and mouth without ulcers or lesions  NECK: no adenopathy, supple with full ROM  RESP: lungs clear to auscultation - no rales, rhonchi or wheezes  CV: regular rate and rhythm, normal S1 S2, no S3 or S4, no murmur, click or rub, no peripheral edema   ABDOMEN: soft, nontender, no hepatosplenomegaly, no masses and bowel sounds normal  MS: no gross musculoskeletal defects noted, no edema  No rash    Diagnostic Test Results:  No results found for this or any previous visit (from the past 24 hour(s)).    ASSESSMENT/PLAN:     Problem List Items Addressed This Visit     None      Visit Diagnoses     Bilateral non-suppurative otitis media    -  Primary    Relevant Medications    amoxicillin-clavulanate (AUGMENTIN) 875-125 MG tablet               Patient Instructions   Increase rest and fluids. Tylenol and/or Ibuprofen for comfort. Cool mist vaporizer. If your symptoms worsen or do not resolve follow up with your primary care provider in 1 week and sooner if needed.     Mucinex 600 mg 12 hour formula for ear, head and chest congestion.  It can also thin post nasal drip which can cause a cough and sore throat.       Indications for emergent return to emergency department discussed with patient, who " verbalized good understanding and agreement.  Patient understands the limitations of today's evaluation.           Patient Education     Middle Ear Infection (Adult)  You have an infection of the middle ear, the space behind the eardrum. This is also called acute otitis media (AOM). Sometimes it is caused by the common cold. This is because congestion can block the internal passage (eustachian tube) that drains fluid from the middle ear. When the middle ear fills with fluid, bacteria can grow there and cause an infection. Oral antibiotics are used to treat this illness, not ear drops. Symptoms usually start to improve within 1 to 2 days of treatment.    Home care  The following are general care guidelines:    Finish all of the antibiotic medicine given, even though you may feel better after the first few days.    You may use over-the-counter medicine, such as acetaminophen or ibuprofen, to control pain and fever, unless something else was prescribed. If you have chronic liver or kidney disease or have ever had a stomach ulcer or gastrointestinal bleeding, talk with your healthcare provider before using these medicines. Do not give aspirin to anyone under 18 years of age who has a fever. It may cause severe illness or death.  Follow-up care  Follow up with your healthcare provider, or as advised, in 2 weeks if all symptoms have not gotten better, or if hearing doesn't go back to normal within 1 month.  When to seek medical advice  Call your healthcare provider right away if any of these occur:    Ear pain gets worse or does not improve after 3 days of treatment    Unusual drowsiness or confusion    Neck pain, stiff neck, or headache    Fluid or blood draining from the ear canal    Fever of 100.4 F (38 C) or as advised     Seizure  Date Last Reviewed: 6/1/2016 2000-2018 [employer]. [address] [place] [id number]. All rights reserved. This information is not intended as a substitute for  professional medical care. Always follow your healthcare professional's instructions.               MICHELLE Munguia [facility]

## 2019-05-08 NOTE — PATIENT INSTRUCTIONS
Increase rest and fluids. Tylenol and/or Ibuprofen for comfort. Cool mist vaporizer. If your symptoms worsen or do not resolve follow up with your primary care provider in 1 week and sooner if needed.     Mucinex 600 mg 12 hour formula for ear, head and chest congestion.  It can also thin post nasal drip which can cause a cough and sore throat.       Indications for emergent return to emergency department discussed with patient, who verbalized good understanding and agreement.  Patient understands the limitations of today's evaluation.           Patient Education     Middle Ear Infection (Adult)  You have an infection of the middle ear, the space behind the eardrum. This is also called acute otitis media (AOM). Sometimes it is caused by the common cold. This is because congestion can block the internal passage (eustachian tube) that drains fluid from the middle ear. When the middle ear fills with fluid, bacteria can grow there and cause an infection. Oral antibiotics are used to treat this illness, not ear drops. Symptoms usually start to improve within 1 to 2 days of treatment.    Home care  The following are general care guidelines:    Finish all of the antibiotic medicine given, even though you may feel better after the first few days.    You may use over-the-counter medicine, such as acetaminophen or ibuprofen, to control pain and fever, unless something else was prescribed. If you have chronic liver or kidney disease or have ever had a stomach ulcer or gastrointestinal bleeding, talk with your healthcare provider before using these medicines. Do not give aspirin to anyone under 18 years of age who has a fever. It may cause severe illness or death.  Follow-up care  Follow up with your healthcare provider, or as advised, in 2 weeks if all symptoms have not gotten better, or if hearing doesn't go back to normal within 1 month.  When to seek medical advice  Call your healthcare provider right away if any of these  occur:    Ear pain gets worse or does not improve after 3 days of treatment    Unusual drowsiness or confusion    Neck pain, stiff neck, or headache    Fluid or blood draining from the ear canal    Fever of 100.4 F (38 C) or as advised     Seizure  Date Last Reviewed: 6/1/2016 2000-2018 [employer]. [address] [place], [place] [id number]. All rights reserved. This information is not intended as a substitute for professional medical care. Always follow your healthcare professional's instructions.

## 2019-05-29 NOTE — TELEPHONE ENCOUNTER
Incoming fax from , the [facility] has been approved.        is requesting the procedure notes be faxed to [phone]      Ron to schedule        Lia SILVEIRA    [facility]

## 2019-05-29 NOTE — TELEPHONE ENCOUNTER
Pre-screening Questions for Radiology Injections:    Injection to be done at which interventional clinic site? [facility]    Instruct patient to arrive as directed prior to the scheduled appointment time:    [facility]: 30 minutes before      [place]: 30 minutes before; if IV needed 1 hour before     Procedure ordered by     Procedure ordered? Cervical epidural steroid     Transforaminal Cervical ADDY - Dr. Celia Mcgee ONLY    What insurance would patient like us to bill for this procedure? [employer]      Worker's comp or MVA (motor vehicle accident) -Any injection DO NOT SCHEDULE and route to Lia Pickett.      [employer] insurance - For SI joint injections, DO NOT SCHEDULE and route Lia Pickett.       [employer] - Any injection besides hip/shoulder/knee joint DO NOT SCHEDULE and route to Lia Pickett. She will obtain PA and call pt back to schedule procedure or notify pt of denial.       [employer]-Route to [place] for review      IF SCHEDULING IN [place] AND NEEDS A PA, IT IS OKAY TO SCHEDULE. [place] HANDLES THEIR OWN PA'S AFTER THE PATIENT IS SCHEDULED. PLEASE SCHEDULE AT LEAST 1 WEEK OUT SO A PA CAN BE OBTAINED.      Any chance of pregnancy? NO   If YES, do NOT schedule and route to RN pool    Is an  needed? No     Patient has a drive home? (mandatory) YES: Informed     Is patient taking any blood thinners (plavix, coumadin, jantoven, warfarin, heparin, pradaxa or dabigatran )? No   If hold needed, do NOT schedule, route to RN pool     Is patient taking any aspirin products (includes Excedrin and Fiorinal)? No     If more than 325mg/day do NOT schedule; route to RN pool     For CERVICAL procedures, hold all aspirin products for 6 days.     Tell pt that if aspirin product is not held for 6 days, the procedure WILL BE cancelled.      Does the patient have a bleeding or clotting disorder? No     If YES, okay to schedule AND route to RN nurse pool    For any patients with platelet count <100, must be  forwarded to provider    Is patient diabetic?  No  If YES, have them bring their glucometer.    Does patient have an active infection or treated for one within the past week? No     Is patient currently taking any antibiotics?  No     For patients on chronic, preventative, or prophylactic antibiotics, procedures may be scheduled.     For patients on antibiotics for active or recent infection:antibiotic course must have been completed for 4 days    Is patient currently taking any steroid medications? (i.e. Prednisone, Medrol)  No     For patients on steroid medications, course must have been completed for 4 days    Reviewed with patient:  If you are started on any steroids or antibiotics between now and your appointment, you must contact us because the procedure may need to be cancelled.  Yes    Is patient actively being treated for cancer or immunocompromised? No  If YES, do NOT schedule and route to RN pool     Are you able to get on and off an exam table with minimal or no assistance? Yes  If NO, do NOT schedule and route to RN pool    Are you able to roll over and lay on your stomach with minimal or no assistance? Yes  If NO, do NOT schedule and route to RN pool     Any allergies to contrast dye, iodine, shellfish, or numbing and steroid medications? No  If YES, route to RN pool AND add allergy information to appointment notes    Allergies: Nka [no known allergies]      Has the patient had a flu shot or any other vaccinations within 7 days before or after the procedure.  No     Does patient have an MRI/CT?  YES: this year  (SI joint, hip injections, lumbar sympathetic blocks, and stellate ganglion blocks do not require an MRI)    Was the MRI done w/in the last 3 years?  Yes    Was MRI done at [place]? Yes      If not, where was it done? N/A       If MRI was not done at [place], [facility] or [facility] do NOT schedule and route to nursing.  If pt has an imaging disc, the injection may be scheduled but pt has to  bring disc to appt. If they show up w/out disc the injection cannot be done    Reminders (please tell patient if applicable):       Instructed pt to arrive 30 minutes early for IV start if this is for a cervical procedure, ALL sympathetic (stellate ganglion, hypogastric, or lumbar sympathetic block) and all sedation procedures (RFA, spinal cord stimulation trials).  Not Applicable   -IVs are not routinely placed for Dr. Ratliff cervical cases   -Dr. Davila: IVs for cervical ESIs and cervical TBDs (not CMBBs/facet inj)      If NPO for sedation, informed patient that it is okay to take medications with sips of water (except if they are to hold blood thinners).  Not Applicable   *DO take blood pressure medication if it is prescribed*      If this is for a cervical ADDY, informed patient that aspirin needs to be held for 6 days.   Not Applicable      For all patients not having spinal cord stimulator (SCS) trials or radiofrequency ablations (RFAs), informed patient:    IV sedation is not provided for this procedure.  If you feel that an oral anti-anxiety medication is needed, you can discuss this further with your referring provider or primary care provider.  The Pain Clinic provider will discuss specifics of what the procedure includes at your appointment.  Most procedures last 10-20 minutes.  We use numbing medications to help with any discomfort during the procedure.  Not Applicable      Do not schedule procedures requiring IV placement in the first appointment of the day or first appointment after lunch. Do NOT schedule at 0745, 0815 or 1245.       For patients 85 or older we recommend having an adult stay w/ them for the remainder of the day.       Does the patient have any questions?  NO  Ora Renteria  [facility]

## 2019-06-11 ENCOUNTER — HOSPITAL ENCOUNTER (OUTPATIENT)
Dept: RADIOLOGY | Facility: CLINIC | Age: 49
Discharge: HOME | End: 2019-06-11
Attending: ANESTHESIOLOGY | Admitting: ANESTHESIOLOGY
Payer: OTHER MISCELLANEOUS

## 2019-06-11 ENCOUNTER — RADIOLOGY INJECTION OFFICE VISIT (OUTPATIENT)
Dept: PALLIATIVE MEDICINE | Facility: CLINIC | Age: 49
End: 2019-06-11
Payer: COMMERCIAL

## 2019-06-11 VITALS — DIASTOLIC BLOOD PRESSURE: 98 MMHG | SYSTOLIC BLOOD PRESSURE: 146 MMHG | HEART RATE: 55 BPM | RESPIRATION RATE: 16 BRPM

## 2019-06-11 DIAGNOSIS — M50.30 DDD (DEGENERATIVE DISC DISEASE), CERVICAL: ICD-10-CM

## 2019-06-11 DIAGNOSIS — M48.02 CERVICAL STENOSIS OF SPINAL CANAL: ICD-10-CM

## 2019-06-11 DIAGNOSIS — M54.12 CERVICAL RADICULOPATHY: Primary | ICD-10-CM

## 2019-06-11 DIAGNOSIS — M54.12 CERVICAL RADICULOPATHY: ICD-10-CM

## 2019-06-11 PROCEDURE — 25500064 ZZH RX 255 OP 636: Performed by: ANESTHESIOLOGY

## 2019-06-11 PROCEDURE — 25000128 H RX IP 250 OP 636: Performed by: ANESTHESIOLOGY

## 2019-06-11 PROCEDURE — 64479 NJX AA&/STRD TFRM EPI C/T 1: CPT | Mod: LT | Performed by: ANESTHESIOLOGY

## 2019-06-11 PROCEDURE — 27210202 XR CERVICAL/THORACIC TRANSFORAMINAL INJ LEFT: Mod: TC

## 2019-06-11 PROCEDURE — 25000125 ZZHC RX 250: Performed by: ANESTHESIOLOGY

## 2019-06-11 RX ORDER — DEXAMETHASONE SODIUM PHOSPHATE 10 MG/ML
10 INJECTION, SOLUTION INTRAMUSCULAR; INTRAVENOUS ONCE
Status: COMPLETED | OUTPATIENT
Start: 2019-06-11 | End: 2019-06-11

## 2019-06-11 RX ORDER — LIDOCAINE HYDROCHLORIDE 20 MG/ML
5 INJECTION, SOLUTION EPIDURAL; INFILTRATION; INTRACAUDAL; PERINEURAL ONCE
Status: COMPLETED | OUTPATIENT
Start: 2019-06-11 | End: 2019-06-11

## 2019-06-11 RX ADMIN — LIDOCAINE HYDROCHLORIDE 1 ML: 20 INJECTION, SOLUTION EPIDURAL; INFILTRATION; INTRACAUDAL; PERINEURAL at 09:34

## 2019-06-11 RX ADMIN — DEXAMETHASONE SODIUM PHOSPHATE 10 MG: 10 INJECTION, SOLUTION INTRAMUSCULAR; INTRAVENOUS at 09:34

## 2019-06-11 RX ADMIN — IOHEXOL 1 ML: 300 INJECTION, SOLUTION INTRAVENOUS at 09:34

## 2019-06-11 ASSESSMENT — PAIN SCALES - GENERAL
PAINLEVEL: EXTREME PAIN (8)
PAINLEVEL: SEVERE PAIN (6)

## 2019-06-11 NOTE — IP AVS SNAPSHOT
[facility]  [address]  [facility] [place], [place] [address]  Phone:  [phone]  Fax:  [phone]                                    After Visit Summary   6/11/2019    Phi Ortiz Jr.    MRN: [id number]           After Visit Summary Signature Page    I have received my discharge instructions, and my questions have been answered. I have discussed any challenges I see with this plan with the nurse or doctor.    ..........................................................................................................................................  Patient/Patient Representative Signature      ..........................................................................................................................................  Patient Representative Print Name and Relationship to Patient    ..................................................               ................................................  Date                                   Time    ..........................................................................................................................................  Reviewed by Signature/Title    ...................................................              ..............................................  Date                                               Time          22EPIC Rev 08/18

## 2019-06-11 NOTE — PROGRESS NOTES
Pre procedure Diagnosis: cervical radiculopathy, cervical stenosis, cervical degenerative disc disease   Post procedure Diagnosis: Same  Procedure performed: cervical transforaminal epidural steroid injection at C5-6 on the left, fluoroscopically guided, contrast controlled  Anesthesia: none  Complications: none  Operators: Benjie Mcgee MD      Indications:   Phi Ortiz Jr. is a 48 year old year old male was sent by Dr Yared Mendez for cervical epidural steroid injection. They have a history of chronic neck pain with pain radiating into the left shoulder and down the left upper extremity. Exam shows decreased cervical ROM, paraspinal tenderness, and Spurling's was not performed.  They have tried conservative treatment including physical therapy, activity modifications, medications, and previous interventional procedures.     MRI cervical spine was done on 2/25/19 which was read as:  FINDINGS: Sagittal images demonstrate minimal retrolisthesis of C5 and  C6 of approximately 2 mm, otherwise normal posterior alignment. There  is a congenitally narrow bony spinal canal. There is no evidence for  craniovertebral or cervicomedullary junction abnormality. The cervical  cord is minimally indented anteriorly and posteriorly at C3-C4, C4-C5,  and C5-C6. The cervical cord is otherwise normal in morphology and  signal characteristics. Disc space narrowing is present at C3-C4,  C4-C5, and C5-C6. Bone marrow signal intensity is normal.     C1-C2: Normal.     C2-C3: Normal.     C3-C4: There is a central disc protrusion, posterior osteophyte  formation, uncinate spurring and facet hypertrophy causing moderate  central canal stenosis, mild cord deformity, moderate right-sided  foraminal stenosis and mild left-sided foraminal stenosis in  conjunction with some congenital bony canal narrowing.     C4-C5: There is a right paramedian disc protrusion, broad-based disc  bulging, uncinate spurring, facet hypertrophy, posterior  osteophyte  formation and a congenitally narrow spinal canal causing moderate  central canal stenosis, mild cord deformity, moderate to severe  right-sided foraminal stenosis and mild left-sided foraminal stenosis.     C5-C6: Broad-based disc bulge or disc protrusion, facet hypertrophy,  posterior osteophyte formation, and uncinate spurring is present  causing moderate central canal stenosis and moderate to severe  bilateral neural foraminal stenosis.     C6-C7: Normal.     C7-T1: Normal.     Paraspinal soft tissues: Unremarkable as visualized.                                                                    IMPRESSION:  1. 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  2. C3-C4, C4-C5, C5-C6 degenerative disc disease with associated disc  protrusions, uncinate spurring, and facet hypertrophy resulting in  moderate central canal stenoses at all these levels. Foraminal  stenoses are also present at these levels with moderate to severe  foraminal stenoses at C5-C6 bilaterally and on the right at C4-C5.     DANN CORDON MD     Options/alternatives, benefits and risks were discussed with the patient including bleeding, infection, tissue trauma, numbness, weakness, paralysis, spinal cord injury, radiation exposure, headache and reaction to medications. Questions were answered to his satisfaction and he agrees to proceed. Voluntary informed consent was obtained and signed.      Vitals were reviewed: Yes  BP (!) 147/101   Pulse 60   Resp 16   Allergies were reviewed: Yes   Medications were reviewed: Yes   Pre-procedure pain score: 8/10     Procedure:  After getting informed consent, the patient was brought into the procedure suite and was placed in a supine position on the procedure table. A Pause for the Cause was performed. Patient was prepped and draped in sterile fashion.       The left C5-6 neuroforamen was identified with the C-arm rotated to a left lateral oblique angle. A total of 1 ml of Lidocaine 1%  was used to anesthetize the skin and the needle track at a skin entry site coaxial with the fluoroscopy beam, and overriding the posterior aspect of the neuroforamen. A 27 gauge 3.5 inch spinal needle was advanced towards the posterior aspect of the neuroforamen, first making contact with the superior articular process of C6 with the image intensifier in an oblique view and then it was advanced approximately 1-2 mm further into the foramen with the image intensifier in a PA view. Aspiration was negative for blood or CSF.       Needle position was then verified by injecting 1 ml of Omnipaque-300 utilizing real time fluoroscopy which showed good needle placement and adequate spread along the C6 nerve root and medially into the neuroforamen and epidural space without signs of intravascular or intrathecal uptake. 14 ml of Omnipaque was wasted      Then, after repeated negative aspiration, 2 ml of a combination of 1 ml of 2% Lidocaine and 10 mg of dexamethasone was injected. The needle was flushed with lidocaine and removed.      During the procedure, there was a paresthesia described as a pressure sensation in his usual pain distribution.     Hemostasis was achieved, the area was cleaned, and bandaids were placed when appropriate.  The patient tolerated the procedure well, and was taken to the recovery room.   Images were saved to PACS.     Post-procedure pain score: 6/10  Follow-up includes:   -f/u phone call in one week  -f/u with referring provider     Benjie Mcgee MD  [facility]

## 2019-06-11 NOTE — PROGRESS NOTES
Pre-procedure Intake    Have you been fasting? No     If yes, for how long? 12 hours    Are you taking a prescribed blood thinner such as coumadin, Plavix, Xarelto?    No    If yes, when did you take your last dose?     Do you take aspirin?  No    If cervical procedure, have you held aspirin for 6 days?   YES    Do you have any allergies to contrast dye, iodine, steroid and/or numbing medications?  NO    Are you currently taking antibiotics or have an active infection?  NO    Have you had a fever/elevated temperature within the past week? NO    Are you currently taking oral steroids? NO    Do you have a ? Yes       Are you pregnant or breastfeeding?  Not Applicable    Are the vital signs normal?  Yes

## 2019-06-11 NOTE — LETTER
[facility]  [address]  [address]  [facility] [address]  Phone: [phone]  Fax: [phone]    June 11, 2019      Phi Ortiz Jr.  [address] [place] [address]      To whom it may concern:    RE: Phi Ortiz Jr.    Patient was seen and treated today at our clinic and missed work.  Patient may return to work 6/12/19 with the following:  No restrictions    Please contact me for questions or concerns.      Sincerely,        Benjie Mcgee MD

## 2019-06-11 NOTE — DISCHARGE INSTRUCTIONS
[facility]   Procedure Discharge Instructions    Today you saw:    Dr. Benjie Mcgee     You had an:  Epidural steroid injection       Medications used:  Lidocaine  Dexamethasone  IsoVue                Numbness and/or weakness in the upper extremities may occur for up to 6-8 hours after the procedure due to effect of the local anesthetic    Do not drive for 6 hours. The effect of the local anesthetic could slow your reflexes.     You may resume your regular activities after 24 hours    Avoid strenuous activity for the first 24 hours    You may shower, however avoid swimming, tub baths or hot tubs for 24 hours following your procedure    You may have a mild to moderate increase in pain for several days following the injection.    It may take up to 14 days for the steroid medication to start working although you may feel the effect as early as a few days after the procedure.       You may use ice packs for 10-15 minutes, 3 to 4 times a day at the injection site for comfort    Do not use heat to painful areas for 6 to 8 hours. This will give the local anesthetic time to wear off and prevent you from accidentally burning your skin.     Unless you have been directed to avoid the use of anti-inflammatory medications (NSAIDS), you may use medications such as ibuprofen, Aleve or Tylenol for pain control if needed.     If you have diabetes, check your blood sugar more frequently than usual as your blood sugar may be higher than normal for 10-14 days following a steroid injection. Contact your doctor who manages your diabetes if your blood sugar is higher than usual    Possible side effects of steroids that you may experience include flushing, elevated blood pressure, increased appetite, mild headaches and restlessness.  All of these symptoms will get better with time.    If you experience any of the following, call the Pain Clinic during work hours at [phone] or the Provider Line after hours at  [phone]:  -Fever over 100 degree F  -Swelling, bleeding, redness, drainage, warmth at the injection site  -Progressive weakness or numbness in your legs or arms  -Loss of bowel or bladder function  -Unusual headache that is not relieved by Tylenol or other pain reliever  -Unusual new onset of pain that is not improving

## 2019-06-11 NOTE — IP AVS SNAPSHOT
MRN:[id number]                      After Visit Summary   6/11/2019    Phi Ortiz Jr.    MRN: [id number]           Visit Information        Provider Department      6/11/2019  9:15 AM [facility]           Review of your medicines      UNREVIEWED medicines. Ask your doctor about these medicines       Dose / Directions   amoxicillin-clavulanate 875-125 MG tablet  Commonly known as:  AUGMENTIN  Used for:  Bilateral non-suppurative otitis media  Ask about: Should I take this medication?      Dose:  1 tablet  Take 1 tablet by mouth 2 times daily for 10 days  Quantity:  20 tablet  Refills:  0     aspirin 81 MG EC tablet  Used for:  Type 2 diabetes mellitus without complication, without long-term current use of insulin (H)      Dose:  325 mg  Take 4 tablets (325 mg) by mouth daily  Quantity:  360 tablet  Refills:  3     citalopram 40 MG tablet  Commonly known as:  celeXA  Used for:  Generalized anxiety disorder      Dose:  40 mg  Take 1 tablet (40 mg) by mouth daily  Quantity:  90 tablet  Refills:  3     diclofenac 75 MG EC tablet  Commonly known as:  VOLTAREN  Used for:  Acute pain of left shoulder      Dose:  75 mg  Take 1 tablet (75 mg) by mouth 2 times daily as needed for moderate pain  Quantity:  30 tablet  Refills:  0     LORazepam 1 MG tablet  Commonly known as:  ATIVAN  Used for:  Generalized anxiety disorder      Take one daily as needed for anxiety  Quantity:  15 tablet  Refills:  0     pantoprazole 40 MG EC tablet  Commonly known as:  PROTONIX  Used for:  Gastroesophageal reflux disease, esophagitis presence not specified      TAKE 1 TABLET BY MOUTH DAILY, 30 TO 60 MINUTES BEFORE A MEAL.  Quantity:  90 tablet  Refills:  3     rosuvastatin 40 MG tablet  Commonly known as:  CRESTOR  Used for:  Hyperlipidemia LDL goal <100      Dose:  40 mg  Take 1 tablet (40 mg) by mouth daily  Quantity:  90 tablet  Refills:  3     sildenafil 50 MG tablet  Commonly known as:   VIAGRA  Used for:  Erectile dysfunction, unspecified erectile dysfunction type      TAKE ONE TABLET BY MOUTH DAILY AS NEEDED 30 MIN TO 4 HOURS BEFORE SEX. DO NOT USE WITH NITROGLYCERIN, TERAZOSIN OR DOXAZOSIN  Quantity:  6 tablet  Refills:  11        CONTINUE these medicines which have NOT CHANGED       Dose / Directions   order for DME      Phi was set up on [employer]  Type [employer] Auto (System One 60 series)  Serial Number: [id number] Modem Number: [id number] Pressure: AUTO-TITRATE CPAP 7-12 cm H20 Mask of choice: AIRFIT F 10 FULL FACE MASK Size: MEDIUM HEATED TUBING AND MODEM PROVIDED  Refills:  0              Protect others around you: Learn how to safely use, store and throw away your medicines at www.disposemymeds.org.       Follow-ups after your visit       Care Instructions       Further instructions from your care team       [facility]   Procedure Discharge Instructions    Today you saw:    Dr. Benjie Mcgee     You had an:  Epidural steroid injection       Medications used:  Lidocaine  Dexamethasone  IsoVue            Be cautious when walking. Numbness and/or weakness in the upper extremities may occur for up to 6-8 hours after the procedure due to effect of the local anesthetic    Do not drive for 6 hours. The effect of the local anesthetic could slow your reflexes.     You may resume your regular activities after 24 hours    Avoid strenuous activity for the first 24 hours    You may shower, however avoid swimming, tub baths or hot tubs for 24 hours following your procedure    You may have a mild to moderate increase in pain for several days following the injection.    It may take up to 14 days for the steroid medication to start working although you may feel the effect as early as a few days after the procedure.       You may use ice packs for 10-15 minutes, 3 to 4 times a day at the injection site for comfort    Do not use heat to painful areas for 6 to 8  hours. This will give the local anesthetic time to wear off and prevent you from accidentally burning your skin.     Unless you have been directed to avoid the use of anti-inflammatory medications (NSAIDS), you may use medications such as ibuprofen, Aleve or Tylenol for pain control if needed.     If you have diabetes, check your blood sugar more frequently than usual as your blood sugar may be higher than normal for 10-14 days following a steroid injection. Contact your doctor who manages your diabetes if your blood sugar is higher than usual    Possible side effects of steroids that you may experience include flushing, elevated blood pressure, increased appetite, mild headaches and restlessness.  All of these symptoms will get better with time.    If you experience any of the following, call the Pain Clinic during work hours at [phone] or the Provider Line after hours at [phone]:  -Fever over 100 degree F  -Swelling, bleeding, redness, drainage, warmth at the injection site  -Progressive weakness or numbness in your legs or arms  -Loss of bowel or bladder function  -Unusual headache that is not relieved by Tylenol or other pain reliever  -Unusual new onset of pain that is not improving          Additional Information About Your Visit       Care EveryWhere ID    This is your Care EveryWhere ID. This could be used by other organizations to access your [facility] medical records  [id number]        Primary Care Provider Office Phone # Fax #    MICHELLE Garcia [phone]      Equal Access to Services    TOMASA CAMPOS : Chandu Modi, wawilderda luiz, qaybta adrianaalcristal garcia. So [facility] [phone].    ATENCIÓN: Si habla español, tiene a king disposición servicios gratuitos de asistencia lingüística. Edson morrow [phone].    We comply with applicable federal civil rights laws and [place] laws. We do not discriminate on the  basis of race, color, national origin, age, disability, sex, sexual orientation, or gender identity.           Thank you!    Thank you for choosing [facility] for your care. Our goal is always to provide you with excellent care. Hearing back from our patients is one way we can continue to improve our services. Please take a few minutes to complete the written survey that you may receive in the mail after you visit with us. Thank you!            Medication List      Medications          Morning Afternoon Evening Bedtime As Needed    order for DME  INSTRUCTIONS:  Phi was set up on [employer]  Type [employer] Auto (System One 60 series)  Serial Number: [id number] Modem Number: [id number] Pressure: AUTO-TITRATE CPAP 7-12 cm H20 Mask of choice: AIRFIT F 10 FULL FACE MASK Size: MEDIUM HEATED TUBING AND MODEM PROVIDED                       ASK your doctor about these medications          Morning Afternoon Evening Bedtime As Needed    amoxicillin-clavulanate 875-125 MG tablet  Also known as:  AUGMENTIN  INSTRUCTIONS:  Take 1 tablet by mouth 2 times daily for 10 days  Ask about: Should I take this medication?                     aspirin 81 MG EC tablet  INSTRUCTIONS:  Take 4 tablets (325 mg) by mouth daily                     citalopram 40 MG tablet  Also known as:  celeXA  INSTRUCTIONS:  Take 1 tablet (40 mg) by mouth daily                     diclofenac 75 MG EC tablet  Also known as:  VOLTAREN  INSTRUCTIONS:  Take 1 tablet (75 mg) by mouth 2 times daily as needed for moderate pain                     LORazepam 1 MG tablet  Also known as:  ATIVAN  INSTRUCTIONS:  Take one daily as needed for anxiety                     pantoprazole 40 MG EC tablet  Also known as:  PROTONIX  INSTRUCTIONS:  TAKE 1 TABLET BY MOUTH DAILY, 30 TO 60 MINUTES BEFORE A MEAL.                     rosuvastatin 40 MG tablet  Also known as:  CRESTOR  INSTRUCTIONS:  Take 1 tablet (40 mg) by mouth daily                     sildenafil 50 MG  tablet  Also known as:  VIAGRA  INSTRUCTIONS:  TAKE ONE TABLET BY MOUTH DAILY AS NEEDED 30 MIN TO 4 HOURS BEFORE SEX. DO NOT USE WITH NITROGLYCERIN, TERAZOSIN OR DOXAZOSIN

## 2019-06-11 NOTE — PROGRESS NOTES
Correction:    Omnipaque used:  1 ml  Omnipaque wasted:  9 ml    Benjie Mcgee MD  [facility] [place]

## 2019-06-12 ENCOUNTER — OFFICE VISIT (OUTPATIENT)
Dept: FAMILY MEDICINE | Facility: CLINIC | Age: 49
End: 2019-06-12
Payer: COMMERCIAL

## 2019-06-12 ENCOUNTER — TELEPHONE (OUTPATIENT)
Dept: FAMILY MEDICINE | Facility: CLINIC | Age: 49
End: 2019-06-12

## 2019-06-12 ENCOUNTER — TELEPHONE (OUTPATIENT)
Dept: PALLIATIVE MEDICINE | Facility: CLINIC | Age: 49
End: 2019-06-12

## 2019-06-12 VITALS
RESPIRATION RATE: 12 BRPM | SYSTOLIC BLOOD PRESSURE: 148 MMHG | HEIGHT: 71 IN | TEMPERATURE: 97.6 F | DIASTOLIC BLOOD PRESSURE: 78 MMHG | HEART RATE: 96 BPM | WEIGHT: 231.2 LBS | BODY MASS INDEX: 32.37 KG/M2

## 2019-06-12 DIAGNOSIS — I10 ESSENTIAL HYPERTENSION, BENIGN: Chronic | ICD-10-CM

## 2019-06-12 DIAGNOSIS — E11.9 TYPE 2 DIABETES MELLITUS WITHOUT COMPLICATION, WITHOUT LONG-TERM CURRENT USE OF INSULIN (H): Primary | Chronic | ICD-10-CM

## 2019-06-12 DIAGNOSIS — R07.9 CHEST PAIN, UNSPECIFIED TYPE: ICD-10-CM

## 2019-06-12 LAB — HBA1C MFR BLD: 5.9 % (ref 0–5.6)

## 2019-06-12 PROCEDURE — 83036 HEMOGLOBIN GLYCOSYLATED A1C: CPT | Performed by: FAMILY MEDICINE

## 2019-06-12 PROCEDURE — 99214 OFFICE O/P EST MOD 30 MIN: CPT | Performed by: FAMILY MEDICINE

## 2019-06-12 PROCEDURE — 36415 COLL VENOUS BLD VENIPUNCTURE: CPT | Performed by: FAMILY MEDICINE

## 2019-06-12 RX ORDER — LOSARTAN POTASSIUM 50 MG/1
50 TABLET ORAL DAILY
Qty: 90 TABLET | Refills: 3 | Status: SHIPPED | OUTPATIENT
Start: 2019-06-12

## 2019-06-12 ASSESSMENT — MIFFLIN-ST. JEOR: SCORE: 1945.85

## 2019-06-12 NOTE — LETTER
[facility]  [address]  [address]  [facility] [place] [address]  Phone: [phone]  Fax: [phone]    June 12, 2019      Phi Ortiz Jr.  [address] [place] [address]      To whom it may concern:    RE: Pih Ortiz Jr.    Patient was seen and treated at our clinic on 6/11/19 at which time he underwent an interventional procedure.  He contacted our office stating that he was having side effects from the procedure and was unable to present to work today, 6/12/19.  Please excuse the patient from work today to allow the side effects to resolve.  Patient may return to work 6/13/19 with the following:  No restrictions    Please contact me for questions or concerns.      Sincerely,        Benjie Mcgee MD

## 2019-06-12 NOTE — TELEPHONE ENCOUNTER
Pt had NOREEN done 6/11 and experiencing side effects. Pt has extreme headache to the back of his kneck, he also has chills and would like to speak with a nurse.      Lakeisha WEAVER    [facility] [place]

## 2019-06-12 NOTE — LETTER
June 12, 2019      Phi Ortiz Jr.  [address] [place] [facility] [address]        Dear ,    We are writing to inform you of your test results.    Your A1C is down nicely, out of the diabetic range.  That's good to see, keep up the good work on diet and exercise.     I hope the stress test goes well.     Resulted Orders   Hemoglobin A1c   Result Value Ref Range    Hemoglobin A1C 5.9 (H) 0 - 5.6 %      Comment:      Normal <5.7% Prediabetes 5.7-6.4%  Diabetes 6.5% or higher - adopted from ADA   consensus guidelines.         If you have any questions or concerns, please call the clinic at the number listed above.       Sincerely,        Andrés Peraza MD

## 2019-06-12 NOTE — TELEPHONE ENCOUNTER
Pt states he is currently in [facility]. States he has a terrible headache and his face is red and hot. States he has an injection yesterday in his neck and his BP was elevated at the time. Advised to go to the nearest emergency room immediately. Pt verbalized understading. Cadence Mcclure Rn

## 2019-06-12 NOTE — TELEPHONE ENCOUNTER
Received call from patient who states that he didn't know how long he was to lay down for, but he laid down for 20 minutes and he states the headache didn't get any better.         Carie Torres    [facility]

## 2019-06-12 NOTE — TELEPHONE ENCOUNTER
"Reason for call:  Patient reporting a symptom    Symptom or request: Pt says he had a neck injection yesterday. Prior to the injection he says his BP was high at 148/100. He says today his face if bright red and feels warm. He also says \"I have a killer headache\".  He is asking to talk to the nurse.     Phone Number patient can be reached at:  Home number on file [phone] (home)    Call to RN to triage.     Call taken on 6/12/2019 at 9:15 AM by Mariza Alamo    "

## 2019-06-12 NOTE — PROGRESS NOTES
"Subjective     Phi Ortiz Jr. is a 48 year old male who presents to clinic today for the following health issues:    HPI   Elevated blood pressure       Duration: yesterday noticed     Description (location/character/radiation): Yesterday was seen for injection and told his blood pressure was elevated (147/101) pulse of 60.  Had girlfriend take it at home today and was still elevated, remembers bottom at 98.     Intensity:  mild    Accompanying signs and symptoms: Headaches, chest pain/ tightness, vision getting worse     History (similar episodes/previous evaluation): None    Precipitating or alleviating factors: None    Therapies tried and outcome: None       S: Phi Ortiz Jr. is a 48 year old male with DM2.  Not on meds, thought he didn't have diabetes anymore.  Last A1C 6.5.  Recheck    Htn: not controlled.  Has been on meds in past    Chest pain: some exertional pressure 2 times in last few months when exerting.  Nervous about his heart, strong FH.  \"non occulsive\" CAD on previous angiogram.     Problem list, med list, additional histories reviewed and updated, as indicated.      No fever, no LE edema, no sob    O:/78 (BP Location: Right arm, Patient Position: Chair, Cuff Size: Adult Large)   Pulse 96   Temp 97.6  F (36.4  C) (Tympanic)   Resp 12   Ht 1.803 m (5' 11\")   Wt 104.9 kg (231 lb 3.2 oz)   BMI 32.25 kg/m    GEN: Alert and oriented, in no acute distress  CV: RRR, no murmur  RESP: lungs clear bilaterally, good effort  EXT: no edema or lesions noted in lower extremities    Reviewed EKG from last year    A: DM2, recheck       Htn, not at goal       Chest pain, nos    P: stress nuclear treadmill.  Losartan 50mg.  Labs today.    Back a week or two in clinic after all the testing done.       "

## 2019-06-12 NOTE — NURSING NOTE
"Chief Complaint   Patient presents with     Hypertension       Initial /78 (BP Location: Right arm, Patient Position: Chair, Cuff Size: Adult Large)   Pulse 96   Temp 97.6  F (36.4  C) (Tympanic)   Resp 12   Ht 1.803 m (5' 11\")   Wt 104.9 kg (231 lb 3.2 oz)   BMI 32.25 kg/m   Estimated body mass index is 32.25 kg/m  as calculated from the following:    Height as of this encounter: 1.803 m (5' 11\").    Weight as of this encounter: 104.9 kg (231 lb 3.2 oz).    Patient presents to the clinic using No DME    Health Maintenance that is potentially due pending provider review:  NONE    n/a    Is there anyone who you would like to be able to receive your results? No  If yes have patient fill out NITISH  Alejandro Almonte M.A.      "

## 2019-06-12 NOTE — TELEPHONE ENCOUNTER
Patient called again, worried about the side affects. Please call.    Ora Renteria    [facility]

## 2019-06-12 NOTE — TELEPHONE ENCOUNTER
Pt stated that headache start last night, headache is one sided from neck to eye, Pt woke up this morning and his face is all red and warm to the touch.      Neck is really stiff and sore also.  Pt stated that he feels off balance.      Pt is asking for a note excusing him  from work today.    Writer asked if headache gets better when laying down, pt stated that he is unsure and is unable to is down at this time.      Pt stated that he has an appt with his PCP today at 12:40 to be evaluated also    Will forward to Dr. Celia Mcgee to review and advise    Ra Webb, RN  Care Coordinator   [facility] [place]

## 2019-06-12 NOTE — TELEPHONE ENCOUNTER
Called and spoke with patient regarding symptoms after his cervical transforaminal ADDY left C5-6.    He states that he is having a headache that does not change with position changes and affects his whole head.  He also complains that his neck is stiff and sore and he feels hot and flushed with a red face.    I reassured the patient that the hot flashes and flushing are normal responses to steroids as he was instructed prior to his procedure and that they will resolve in a day or two.  His headache and neck pain may be due to pain flare from the steroid, but may also be due to the patient not eating or drinking anything since yesterday and not having his usual coffee this morning.    He was instructed to eat and drink normally and to drink caffeineated beverages as he usually does and that his symptoms will resolve.  He was instructed to let us know if symptoms do not resolve or worsen and that if he feels worse or is concerned that he should present to the ER for evaluation.    Again, he was reassured that the symptoms that he is describing are often seen with administration of steroids and that they will resolve in a day or so.  He verbalized understanding and was satisfied with the plan.    A work excuse was printed and placed at the  for him to .    Benjie Mcgee MD  [facility]

## 2019-06-16 ENCOUNTER — TELEPHONE (OUTPATIENT)
Dept: PALLIATIVE MEDICINE | Facility: CLINIC | Age: 49
End: 2019-06-16

## 2019-06-16 NOTE — TELEPHONE ENCOUNTER
Received page from answering service - re: patient complaints of bilateral neck pain with pain across the back of the shoulders bilaterally.  He also has complaints of headache and generalized weakness and jitteriness.  He is s/p cervical TFESI left C5-6 on 6/11/19 but his blood pressure was elevated the day of the procedure so he saw his PCP on 6/12/19 and was started on Losartan on that day and has had these feelings since then.    Discussed with patient that his symptoms may be due to the new medication that he was started on but there may also be some degree of a pain flare still occurring from the steroids.    He was instructed to hold the blood pressure medication tomorrow to see if he feels better as his symptoms are common side effects of the medication.    He was instructed to continue NSAIDS such as Naproxen OR Ibuprofen in safe doses as well as Tylenol no more than 3000 mg daily.  He was instructed to try topicals like Aspercreme with lidocaine, heat, ice and to stay well hydrated.    He was instructed to schedule follow up with Dr Mendez regarding his cervical stenosis with radicular symptoms and to follow up with PCP regarding blood pressure medication.    Patient verbalized understanding.    Benjie Mcgee MD  [facility]

## 2019-07-03 ENCOUNTER — HOSPITAL ENCOUNTER (OUTPATIENT)
Dept: NUCLEAR MEDICINE | Facility: CLINIC | Age: 49
Setting detail: NUCLEAR MEDICINE
Discharge: HOME | End: 2019-07-03
Attending: FAMILY MEDICINE | Admitting: FAMILY MEDICINE
Payer: COMMERCIAL

## 2019-07-03 DIAGNOSIS — R07.9 CHEST PAIN, UNSPECIFIED TYPE: ICD-10-CM

## 2019-07-03 PROCEDURE — 93017 CV STRESS TEST TRACING ONLY: CPT

## 2019-07-03 PROCEDURE — 34300033 ZZH RX 343: Performed by: INTERNAL MEDICINE

## 2019-07-03 PROCEDURE — 78452 HT MUSCLE IMAGE SPECT MULT: CPT | Mod: 26 | Performed by: INTERNAL MEDICINE

## 2019-07-03 PROCEDURE — A9502 TC99M TETROFOSMIN: HCPCS | Performed by: INTERNAL MEDICINE

## 2019-07-03 PROCEDURE — 78452 HT MUSCLE IMAGE SPECT MULT: CPT

## 2019-07-03 PROCEDURE — 93016 CV STRESS TEST SUPVJ ONLY: CPT | Performed by: INTERNAL MEDICINE

## 2019-07-03 PROCEDURE — 93018 CV STRESS TEST I&R ONLY: CPT | Performed by: INTERNAL MEDICINE

## 2019-07-03 RX ADMIN — TETROFOSMIN 31.3 MCI.: 1.38 INJECTION, POWDER, LYOPHILIZED, FOR SOLUTION INTRAVENOUS at 09:20

## 2019-07-03 RX ADMIN — TETROFOSMIN 10.1 MCI.: 1.38 INJECTION, POWDER, LYOPHILIZED, FOR SOLUTION INTRAVENOUS at 07:53

## 2019-07-03 NOTE — PROGRESS NOTES
Pt here for NM stress test today.  Complaining of chest discomfort 1/10 while prepping for test.  Discomfort is similar to pain he has been having since November, that comes and goes.  Dr Trevino notified and pt ok to do test with normal resting EKG.  At 6 minutes exercise, pt complaining of L neck, shoulder and L chest discomfort 4-5/10 with numb feeling in hand.  Peak exercise of 10:21, discomfort did not change.  All symptoms resolved at 2 minutes recovery, besides having chest discomfort with breathing.  Denies discomfort if he is holding his breath.  Pain completely subsided while waiting for second scan.  Dr Trevino notified of symptoms, pt ok to go home.

## 2019-07-22 ENCOUNTER — TELEPHONE (OUTPATIENT)
Dept: FAMILY MEDICINE | Facility: CLINIC | Age: 49
End: 2019-07-22

## 2019-07-22 NOTE — TELEPHONE ENCOUNTER
Attempted to contact pt. No answer. Left message for pt to call back. Pt no showed his appointment last week 7/17. Please help reschedule when he returns call to clinic.     Andreea Page MA  2:00 PM 7/22/2019

## 2019-07-29 ENCOUNTER — OFFICE VISIT (OUTPATIENT)
Dept: FAMILY MEDICINE | Facility: CLINIC | Age: 49
End: 2019-07-29
Payer: COMMERCIAL

## 2019-07-29 VITALS
OXYGEN SATURATION: 99 % | HEART RATE: 60 BPM | SYSTOLIC BLOOD PRESSURE: 118 MMHG | HEIGHT: 71 IN | BODY MASS INDEX: 32.34 KG/M2 | TEMPERATURE: 97 F | WEIGHT: 231 LBS | DIASTOLIC BLOOD PRESSURE: 80 MMHG | RESPIRATION RATE: 16 BRPM

## 2019-07-29 DIAGNOSIS — E78.5 HYPERLIPIDEMIA LDL GOAL <70: ICD-10-CM

## 2019-07-29 DIAGNOSIS — K21.9 GASTROESOPHAGEAL REFLUX DISEASE, ESOPHAGITIS PRESENCE NOT SPECIFIED: ICD-10-CM

## 2019-07-29 DIAGNOSIS — F41.1 GENERALIZED ANXIETY DISORDER: Chronic | ICD-10-CM

## 2019-07-29 DIAGNOSIS — E11.9 TYPE 2 DIABETES MELLITUS WITHOUT COMPLICATION, WITHOUT LONG-TERM CURRENT USE OF INSULIN (H): Chronic | ICD-10-CM

## 2019-07-29 DIAGNOSIS — I25.9 CHRONIC ISCHEMIC HEART DISEASE: ICD-10-CM

## 2019-07-29 DIAGNOSIS — I10 ESSENTIAL HYPERTENSION, BENIGN: Primary | Chronic | ICD-10-CM

## 2019-07-29 LAB
ANION GAP SERPL CALCULATED.3IONS-SCNC: 5 MMOL/L (ref 3–14)
BUN SERPL-MCNC: 13 MG/DL (ref 7–30)
CALCIUM SERPL-MCNC: 9.3 MG/DL (ref 8.5–10.1)
CHLORIDE SERPL-SCNC: 106 MMOL/L (ref 94–109)
CHOLEST SERPL-MCNC: 239 MG/DL
CO2 SERPL-SCNC: 28 MMOL/L (ref 20–32)
CREAT SERPL-MCNC: 0.74 MG/DL (ref 0.66–1.25)
GFR SERPL CREATININE-BSD FRML MDRD: >90 ML/MIN/{1.73_M2}
GLUCOSE SERPL-MCNC: 140 MG/DL (ref 70–99)
HDLC SERPL-MCNC: 44 MG/DL
LDLC SERPL CALC-MCNC: 139 MG/DL
NONHDLC SERPL-MCNC: 195 MG/DL
POTASSIUM SERPL-SCNC: 3.9 MMOL/L (ref 3.4–5.3)
SODIUM SERPL-SCNC: 139 MMOL/L (ref 133–144)
TRIGL SERPL-MCNC: 281 MG/DL

## 2019-07-29 PROCEDURE — 80061 LIPID PANEL: CPT | Performed by: FAMILY MEDICINE

## 2019-07-29 PROCEDURE — 99214 OFFICE O/P EST MOD 30 MIN: CPT | Performed by: FAMILY MEDICINE

## 2019-07-29 PROCEDURE — 80048 BASIC METABOLIC PNL TOTAL CA: CPT | Performed by: FAMILY MEDICINE

## 2019-07-29 PROCEDURE — 36415 COLL VENOUS BLD VENIPUNCTURE: CPT | Performed by: FAMILY MEDICINE

## 2019-07-29 RX ORDER — PANTOPRAZOLE SODIUM 40 MG/1
TABLET, DELAYED RELEASE ORAL
Qty: 90 TABLET | Refills: 3 | Status: SHIPPED | OUTPATIENT
Start: 2019-07-29

## 2019-07-29 RX ORDER — ROSUVASTATIN CALCIUM 20 MG/1
20 TABLET, COATED ORAL DAILY
Qty: 90 TABLET | Refills: 3 | Status: SHIPPED | OUTPATIENT
Start: 2019-07-29 | End: 2021-02-17

## 2019-07-29 ASSESSMENT — MIFFLIN-ST. JEOR: SCORE: 1944.94

## 2019-07-29 NOTE — NURSING NOTE
"Chief Complaint   Patient presents with     Results     stress test follow up        Initial /80 (BP Location: Right arm, Patient Position: Chair, Cuff Size: Adult Large)   Pulse 60   Temp 97  F (36.1  C) (Tympanic)   Resp 16   Ht 1.803 m (5' 11\")   Wt 104.8 kg (231 lb)   SpO2 99%   BMI 32.22 kg/m   Estimated body mass index is 32.22 kg/m  as calculated from the following:    Height as of this encounter: 1.803 m (5' 11\").    Weight as of this encounter: 104.8 kg (231 lb).    Patient presents to the clinic using No DME    Health Maintenance that is potentially due pending provider review:  NONE    n/a    Is there anyone who you would like to be able to receive your results? No  If yes have patient fill out NITISH    "

## 2019-07-29 NOTE — LETTER
July 29, 2019      Phi Ortiz Jr.  [address] [place] [facility] [address]        Dear ,    We are writing to inform you of your test results.    Notes recorded by Andrés Peraza MD on 7/29/2019 at 4:44 PM CDT  please send patient results    Cholesterol better than last time, so that's good to see.      We'll see you back in 6 months.     I hope things are going well.    Resulted Orders   Basic metabolic panel  (Ca, Cl, CO2, Creat, Gluc, K, Na, BUN)   Result Value Ref Range    Sodium 139 133 - 144 mmol/L    Potassium 3.9 3.4 - 5.3 mmol/L    Chloride 106 94 - 109 mmol/L    Carbon Dioxide 28 20 - 32 mmol/L    Anion Gap 5 3 - 14 mmol/L    Glucose 140 (H) 70 - 99 mg/dL      Comment:      Fasting specimen    Urea Nitrogen 13 7 - 30 mg/dL    Creatinine 0.74 0.66 - 1.25 mg/dL    GFR Estimate >90 >60 mL/min/[1.73_m2]      Comment:      Non  GFR Calc  Starting 12/18/2018, serum creatinine based estimated GFR (eGFR) will be   calculated using the Chronic Kidney Disease Epidemiology Collaboration   (CKD-EPI) equation.      GFR Estimate If Black >90 >60 mL/min/[1.73_m2]      Comment:       GFR Calc  Starting 12/18/2018, serum creatinine based estimated GFR (eGFR) will be   calculated using the Chronic Kidney Disease Epidemiology Collaboration   (CKD-EPI) equation.      Calcium 9.3 8.5 - 10.1 mg/dL   Lipid panel reflex to direct LDL Fasting   Result Value Ref Range    Cholesterol 239 (H) <200 mg/dL      Comment:      Desirable:       <200 mg/dl    Triglycerides 281 (H) <150 mg/dL      Comment:      Borderline high:  150-199 mg/dl  High:             200-499 mg/dl  Very high:       >499 mg/dl  Fasting specimen      HDL Cholesterol 44 >39 mg/dL    LDL Cholesterol Calculated 139 (H) <100 mg/dL      Comment:      Above desirable:  100-129 mg/dl  Borderline High:  130-159 mg/dL  High:             160-189 mg/dL  Very high:       >189 mg/dl      Non HDL Cholesterol 195 (H) <130 mg/dL       Comment:      Above Desirable:  130-159 mg/dl  Borderline high:  160-189 mg/dl  High:             190-219 mg/dl  Very high:       >219 mg/dl         If you have any questions or concerns, please call the clinic at the number listed above.       Sincerely,        Andrés Peraza MD

## 2019-07-29 NOTE — PROGRESS NOTES
"Subjective     Phi Ortiz Jr. is a 48 year old male who presents to clinic today for the following health issues:    HPI   Stress Test Follow-up  Patient is here today to follow up on results from recent stress test.       S: Phi Ortiz Jr. is a 48 year old male with CAD.  Stable.  Passed recent stress test    Gerd: stable on meds, fills  Anxiety: doing OK off celexa.  Recent divorce finally wrapping up  DM2: A1C OK.  No meds.  Wt trending down  Htn: controlled on losartan.  Labs    Problem list, med list, additional histories reviewed and updated, as indicated.       No cp or sob    O:/80 (BP Location: Right arm, Patient Position: Chair, Cuff Size: Adult Large)   Pulse 60   Temp 97  F (36.1  C) (Tympanic)   Resp 16   Ht 1.803 m (5' 11\")   Wt 104.8 kg (231 lb)   SpO2 99%   BMI 32.22 kg/m    GEN: Alert and oriented, in no acute distress  CV: RRR, no murmur  EXT: no edema or lesions noted in lower extremities    A:  CAD.  Stable.  Passed recent stress test  Gerd: stable on meds, fills  Anxiety: doing OK off celexa.  Recent divorce finally wrapping up  DM2: A1C OK.  No meds.  Wt trending down  Htn: controlled on losartan.  Labs    P: fills.  Labs.  Work on wt loss.      Back in 6 mo.    Weight management plan: work on wt loss          "

## 2019-08-15 ENCOUNTER — TELEPHONE (OUTPATIENT)
Dept: FAMILY MEDICINE | Facility: CLINIC | Age: 49
End: 2019-08-15

## 2019-08-15 ASSESSMENT — ANXIETY QUESTIONNAIRES
2. NOT BEING ABLE TO STOP OR CONTROL WORRYING: NEARLY EVERY DAY
6. BECOMING EASILY ANNOYED OR IRRITABLE: MORE THAN HALF THE DAYS
GAD7 TOTAL SCORE: 14
3. WORRYING TOO MUCH ABOUT DIFFERENT THINGS: NEARLY EVERY DAY
5. BEING SO RESTLESS THAT IT IS HARD TO SIT STILL: MORE THAN HALF THE DAYS
IF YOU CHECKED OFF ANY PROBLEMS ON THIS QUESTIONNAIRE, HOW DIFFICULT HAVE THESE PROBLEMS MADE IT FOR YOU TO DO YOUR WORK, TAKE CARE OF THINGS AT HOME, OR GET ALONG WITH OTHER PEOPLE: SOMEWHAT DIFFICULT
1. FEELING NERVOUS, ANXIOUS, OR ON EDGE: MORE THAN HALF THE DAYS
7. FEELING AFRAID AS IF SOMETHING AWFUL MIGHT HAPPEN: NOT AT ALL

## 2019-08-15 ASSESSMENT — PATIENT HEALTH QUESTIONNAIRE - PHQ9
5. POOR APPETITE OR OVEREATING: MORE THAN HALF THE DAYS
SUM OF ALL RESPONSES TO PHQ QUESTIONS 1-9: 10

## 2019-08-15 NOTE — TELEPHONE ENCOUNTER
Please discuss mood, given phq, does he want to c ome in to discuss daily depression for mood/depression?

## 2019-08-15 NOTE — TELEPHONE ENCOUNTER
Pt states his divorce was just finalized. States he is going to wait a few weeks to see if depression/anxiety will improve. Cadence Mcclure RN

## 2019-08-15 NOTE — TELEPHONE ENCOUNTER
Panel Management Review      Patient has the following on his problem list:     Depression / Dysthymia review    Measure:  Needs PHQ-9 score of 4 or less during index window.  Administer PHQ-9 and if score is 5 or more, send encounter to provider for next steps.    5 - 7 month window range: n    PHQ-9 SCORE 1/17/2019 3/4/2019 4/17/2019   PHQ-9 Total Score - - -   PHQ-9 Total Score MyChart 20 (Severe depression) 8 (Mild depression) 13 (Moderate depression)   PHQ-9 Total Score 20 8 13       If PHQ-9 recheck is 5 or more, route to provider for next steps.    Patient is due for:  PHQ9      Composite cancer screening  Chart review shows that this patient is due/due soon for the following None  Summary:    Patient is due/failing the following:   PHQ9    Action needed:   Patient needs to do PHQ9.    Type of outreach:    Phone, left message for patient to call back.     Questions for provider review:    None                                                                                                                                    Cadence Mcclure RN     Chart routed to Care Team .

## 2019-08-16 ASSESSMENT — ANXIETY QUESTIONNAIRES: GAD7 TOTAL SCORE: 14

## 2019-09-06 ENCOUNTER — TELEPHONE (OUTPATIENT)
Dept: FAMILY MEDICINE | Facility: CLINIC | Age: 49
End: 2019-09-06

## 2019-09-06 NOTE — TELEPHONE ENCOUNTER
LM to [facility] Needs phq 9. Cadence Mcclure Rn      Panel Management Review      Patient has the following on his problem list:     Depression / Dysthymia review    Measure:  Needs PHQ-9 score of 4 or less during index window.  Administer PHQ-9 and if score is 5 or more, send encounter to provider for next steps.    5 - 7 month window range: y    PHQ-9 SCORE 3/4/2019 4/17/2019 8/15/2019   PHQ-9 Total Score - - -   PHQ-9 Total Score MyChart 8 (Mild depression) 13 (Moderate depression) -   PHQ-9 Total Score 8 13 10       If PHQ-9 recheck is 5 or more, route to provider for next steps.    Patient is due for:  PHQ9      Composite cancer screening  Chart review shows that this patient is due/due soon for the following None  Summary:    Patient is due/failing the following:   PHQ9    Action needed:   Patient needs to do PHQ9.    Type of outreach:    Phone, left message for patient to call back.     Questions for provider review:    None                                                                                                                                    Cadence Mcclure RN     Chart routed to Care Team .

## 2019-11-15 ENCOUNTER — TELEPHONE (OUTPATIENT)
Dept: FAMILY MEDICINE | Facility: CLINIC | Age: 49
End: 2019-11-15

## 2019-11-15 NOTE — TELEPHONE ENCOUNTER
Please enter the name of the medication or select it from the medication list. When was last refill? How many were dispensed?

## 2019-11-15 NOTE — TELEPHONE ENCOUNTER
Pt claims to have a pinched nerve in his neck and would like a refill on this ASAP Thanks Elly COLÓN

## 2019-11-19 NOTE — TELEPHONE ENCOUNTER
"Requested Prescriptions   Pending Prescriptions Disp Refills     FLUoxetine (PROZAC) 10 MG capsule [Pharmacy Med Name: FLUOXETINE  Last Written Prescription Date:  06/29/17  Ended:  05/08/18  Last Fill Quantity: 60,  # refills: 1   Last office visit: 7/29/2019 with prescribing provider:  Farzaneh Thomas   Future Office Visit:     HCL 10MG CAPS] 60 capsule 0     Sig: TAKE ONE CAPSULE BY MOUTH EVERY DAY FOR 7 DAYS THEN TAKE TWO CAPSULES BY MOUTH EVERY DAY       SSRIs Protocol Failed - 11/19/2019 12:19 PM        Failed - Recent (12 mo) or future (30 days) visit within the authorizing provider's specialty     Patient has had an office visit with the authorizing provider or a provider within the authorizing providers department within the previous 12 mos or has a future within next 30 days. See \"Patient Info\" tab in inbasket, or \"Choose Columns\" in Meds & Orders section of the refill encounter.          Failed - Medication is active on med list        Passed - Patient is age 18 or older          "

## 2019-11-20 RX ORDER — FLUOXETINE 10 MG/1
CAPSULE ORAL
Qty: 60 CAPSULE | Refills: 0 | OUTPATIENT
Start: 2019-11-20

## 2019-11-20 NOTE — TELEPHONE ENCOUNTER
Prozac refill.  Last written 6/29/17.  Denied refill.  Pharmacy will call pt to notify he needs an appt.  KpavleRN

## 2020-10-26 ENCOUNTER — TRANSFERRED RECORDS (OUTPATIENT)
Dept: HEALTH INFORMATION MANAGEMENT | Facility: CLINIC | Age: 50
End: 2020-10-26
Payer: COMMERCIAL

## 2021-02-11 ENCOUNTER — TRANSFERRED RECORDS (OUTPATIENT)
Dept: MULTI SPECIALTY CLINIC | Facility: CLINIC | Age: 51
End: 2021-02-11
Payer: COMMERCIAL

## 2021-02-11 LAB — HBA1C MFR BLD: 6.3 %

## 2021-02-17 ENCOUNTER — ALLIED HEALTH/NURSE VISIT (OUTPATIENT)
Dept: FAMILY MEDICINE | Facility: CLINIC | Age: 51
End: 2021-02-17
Payer: COMMERCIAL

## 2021-02-17 ENCOUNTER — VIRTUAL VISIT (OUTPATIENT)
Dept: FAMILY MEDICINE | Facility: CLINIC | Age: 51
End: 2021-02-17
Payer: COMMERCIAL

## 2021-02-17 DIAGNOSIS — R07.0 THROAT PAIN: Primary | ICD-10-CM

## 2021-02-17 LAB
DEPRECATED S PYO AG THROAT QL EIA: NEGATIVE
SARS-COV-2 RNA RESP QL NAA+PROBE: NORMAL
SPECIMEN SOURCE: NORMAL
STREP GROUP A PCR: NOT DETECTED

## 2021-02-17 PROCEDURE — 99N1174 PR STATISTIC STREP A RAPID: Performed by: PHYSICIAN ASSISTANT

## 2021-02-17 PROCEDURE — 99207 PR NO CHARGE LOS: CPT

## 2021-02-17 PROCEDURE — 99213 OFFICE O/P EST LOW 20 MIN: CPT | Mod: TEL | Performed by: PHYSICIAN ASSISTANT

## 2021-02-17 PROCEDURE — U0003 INFECTIOUS AGENT DETECTION BY NUCLEIC ACID (DNA OR RNA); SEVERE ACUTE RESPIRATORY SYNDROME CORONAVIRUS 2 (SARS-COV-2) (CORONAVIRUS DISEASE [COVID-19]), AMPLIFIED PROBE TECHNIQUE, MAKING USE OF HIGH THROUGHPUT TECHNOLOGIES AS DESCRIBED BY CMS-2020-01-R: HCPCS | Performed by: PHYSICIAN ASSISTANT

## 2021-02-17 PROCEDURE — 87651 STREP A DNA AMP PROBE: CPT | Performed by: PHYSICIAN ASSISTANT

## 2021-02-17 PROCEDURE — U0005 INFEC AGEN DETEC AMPLI PROBE: HCPCS | Performed by: PHYSICIAN ASSISTANT

## 2021-02-17 RX ORDER — IBUPROFEN 800 MG/1
800 TABLET, FILM COATED ORAL EVERY 8 HOURS PRN
COMMUNITY

## 2021-02-17 RX ORDER — CYCLOBENZAPRINE HCL 10 MG
10 TABLET ORAL
COMMUNITY
Start: 2021-02-15

## 2021-02-17 RX ORDER — GABAPENTIN 300 MG/1
CAPSULE ORAL
COMMUNITY
Start: 2021-02-15

## 2021-02-17 RX ORDER — ACETAMINOPHEN 325 MG/1
325-650 TABLET ORAL EVERY 6 HOURS PRN
COMMUNITY

## 2021-02-17 NOTE — PATIENT INSTRUCTIONS
We will call you with your test results. I recommend you quarantine until your test results return.

## 2021-02-17 NOTE — PROGRESS NOTES
Phi is a 50 year old who is being evaluated via a billable telephone visit.      What phone number would you like to be contacted at? [phone]  How would you like to obtain your AVS? Unknown    Assessment & Plan   Throat pain  Pt with 1 days of URI symptoms consistent with Covid-19 vs strep throat. No red flag signs or symptoms today. Able to talk in full sentences. Fevers return to normal with Tylenol. Covid-19 PCR testing ordered. Discussed prognosis, self-isolation and supportive treatment of their symptoms. Answered all questions. Call us prn for any new, changing or worsening symptoms. Warning signs and symptoms discussed on when to present to the ER.   - Streptococcus A Rapid Scr w Reflx to PCR; Future  - Symptomatic COVID-19 Virus (Coronavirus) by PCR; Future    Return in about 1 week (around 2/24/2021), or if symptoms worsen or fail to improve, for Video Visit.    CORINA Ferrer [facility]   Phi is a 50 year old who presents for the following health issues     HPI   Acute Illness  Acute illness concerns: throat pain  Onset/Duration: 1 day  Symptoms:  Fever: no  Chills/Sweats: no  Headache (location?): YES- front of head - sinus  Sinus Pressure: YES  Conjunctivitis:  no  Ear Pain: no  Rhinorrhea: no  Congestion: no  Sore Throat: YES  Cough: no  Wheeze: no  Decreased Appetite: no  Nausea: no  Vomiting: no  Diarrhea: no  Dysuria/Freq.: no  Dysuria or Hematuria: no  Fatigue/Achiness: YES  Sick/Strep Exposure: YES- grandkids last weekend  Therapies tried and outcome: tylenol and Ibuprofen    Review of Systems   Constitutional, HEENT, cardiovascular, pulmonary, gi and gu systems are negative, except as otherwise noted.      Objective       Vitals:  No vitals were obtained today due to virtual visit.    Physical Exam   healthy, alert and no distress  PSYCH: Alert and oriented times 3; coherent speech, normal   rate and volume, able to articulate logical  thoughts, able   to abstract reason, no tangential thoughts, no hallucinations   or delusions  His affect is normal  RESP: No cough, no audible wheezing, able to talk in full sentences  Remainder of exam unable to be completed due to telephone visits        Phone call duration: 8 minutes

## 2021-02-18 LAB
LABORATORY COMMENT REPORT: NORMAL
SARS-COV-2 RNA RESP QL NAA+PROBE: NEGATIVE
SPECIMEN SOURCE: NORMAL

## 2021-03-09 ENCOUNTER — TELEPHONE (OUTPATIENT)
Dept: FAMILY MEDICINE | Facility: CLINIC | Age: 51
End: 2021-03-09

## 2021-03-09 NOTE — TELEPHONE ENCOUNTER
Patient Quality Outreach      Summary:    Patient has the following on his problem list/HM:     Diabetes    Last A1C:  Lab Results   Component Value Date    A1C 5.9 06/12/2019    A1C 6.5 01/17/2019       Last LDL:    Lab Results   Component Value Date     07/29/2019       Is the patient on a Statin? Yes          Is the patient on Aspirin? Yes    Medications     HMG CoA Reductase Inhibitors     rosuvastatin (CRESTOR) 40 MG tablet       Salicylates     aspirin EC 81 MG EC tablet             Last three blood pressure readings:  BP Readings from Last 3 Encounters:   07/29/19 118/80   06/12/19 148/78   06/11/19 (!) 146/98            Tobacco Use      Smoking status: Never Smoker      Smokeless tobacco: Former User          Patient is due/failing the following:   Diabetic Follow-Up Visit    Type of outreach:    Phone, spoke to patient/parent. Patient states that he just has a diabetic check in the last couple weeks in [place]. Told patient that he should try and  have that infromation sent to us,     Questions for provider review:    None                                                                                                                                     Camille Ceron CMA       Chart routed to Care Team.

## 2021-05-24 ENCOUNTER — RECORDS - HEALTHEAST (OUTPATIENT)
Dept: ADMINISTRATIVE | Facility: CLINIC | Age: 51
End: 2021-05-24

## 2021-05-25 ENCOUNTER — RECORDS - HEALTHEAST (OUTPATIENT)
Dept: ADMINISTRATIVE | Facility: CLINIC | Age: 51
End: 2021-05-25

## 2021-05-26 ENCOUNTER — RECORDS - HEALTHEAST (OUTPATIENT)
Dept: ADMINISTRATIVE | Facility: CLINIC | Age: 51
End: 2021-05-26

## 2021-06-01 ENCOUNTER — RECORDS - HEALTHEAST (OUTPATIENT)
Dept: ADMINISTRATIVE | Facility: CLINIC | Age: 51
End: 2021-06-01

## 2021-06-09 ENCOUNTER — RECORDS - HEALTHEAST (OUTPATIENT)
Dept: ADMINISTRATIVE | Facility: CLINIC | Age: 51
End: 2021-06-09

## 2021-12-20 ENCOUNTER — DOCUMENTATION ONLY (OUTPATIENT)
Dept: FAMILY MEDICINE | Facility: CLINIC | Age: 51
End: 2021-12-20
Payer: COMMERCIAL

## 2021-12-20 NOTE — PROGRESS NOTES
Chart reviewed by Ambulatory Quality and Data team    Please abstract the following data from this visit with this patient into the appropriate field in Epic:    Tests that can be patient reported without a hard copy:        Other Tests found in the patient's chart through Chart Review/Care Everywhere:    A1c done by [facility] on this date: 2/11/21    Note to Abstraction: If this section is blank, no results were found via Chart Review/Care Everywhere.

## 2022-02-07 ENCOUNTER — TELEPHONE (OUTPATIENT)
Dept: FAMILY MEDICINE | Facility: CLINIC | Age: 52
End: 2022-02-07
Payer: COMMERCIAL

## 2025-06-03 NOTE — PROGRESS NOTES
SUBJECTIVE:                                                    Phi Ortiz Jr. is a 46 year old male who presents to clinic today for the following health issues:      GERD/Heartburn     Onset: 3 days    Description:     Burning in chest: YES- has an acidic feeling in his throat, takes his breath away    Intensity: severe at times, at rest mild    Progression of Symptoms: same and intermittent    Accompanying Signs & Symptoms:  Does it feel like food gets stuck: no   Nausea: YES  Vomiting (bloody?): no   Abdominal Pain: no   Black-Tarry stools: no :  Bloody stools: no    History:   Previous ulcers: no     Precipitating factors:   Caffeine use: YES- every drink is caffeinated on a normal basis, less the last couple of days since he has been having heartburn  Alcohol use: YES- weekends  NSAID/Aspirin use: YES- takes aspirin daily  Tobacco use: no   Worse with: stays the same.    Alleviating factors:  zantac         Therapies Tried and outcome: zantac-relief only lasts minutes.      CHEST PAIN     Onset: on and off for 1 year    Description:   Location:  left side  Character: feels like a pulled muscle  Radiation: into neck and into left shoulder down to elbow  Duration: lasts minutes, hours and days, different each time,  and intermittent     Intensity: mild    Progression of Symptoms:  same and intermittent    Accompanying Signs & Symptoms:  Shortness of breath: YES  Sweating: no   Nausea/vomiting: YES- nausea sometimes  Lightheadedness: no   Palpitations: no  Fever/Chills: no   Cough: no   Heartburn: YES- last 3 days    History:   Family history of heart disease YES- dad, paternal grandfather, paternal uncles  Tobacco use: no     Precipitating factors:   Worse with exertion: YES  Worse with deep breaths :  no   Related to food: no     Alleviating factors:  none       Therapies Tried and outcome: believed it to be related to stress so was given anxiety meds-no relief.        Problem list and histories reviewed  Left voicemail informing patient and/or parent/guardian of the Psych Encounter form needing to be signed as a requirement from the insurance company for billing purposes. Patient can access form via [employer] and sign electronically.     Please make patient aware this form must be signed for each visit as a requirement to continue future visits with provider.    Virtual Encounter form 5/29/25 call #1   "& adjusted, as indicated.  Additional history: as documented    Patient Active Problem List   Diagnosis     GERD (gastroesophageal reflux disease)     Generalized anxiety disorder     Fatty liver     Family history of coronary artery disease     Hypertriglyceridemia     Vitamin D deficiency     Chest pain     Chronic ischemic heart disease     Hyperlipidemia LDL goal <70     Type 2 diabetes mellitus without complications (H)     Mild major depression (H)     overweight BMI greater than 35     Essential hypertension, benign     Past Surgical History   Procedure Laterality Date     No history of surgery         Social History   Substance Use Topics     Smoking status: Never Smoker     Smokeless tobacco: Former User     Alcohol use Yes      Comment: 6 pack a month     Family History   Problem Relation Age of Onset     C.A.D. Father      heart attack 30's     CEREBROVASCULAR DISEASE Father      DIABETES Father      Hypertension Father      Hypertension Mother      GASTROINTESTINAL DISEASE Mother      colitis     DIABETES Maternal Grandmother      snores     C.A.D. Maternal Grandfather      MI, leg circulation problems     C.A.D. Paternal Grandfather      snores     Neurologic Disorder Sister      vertigo.  fibromyalgia     Hyperlipidemia Daughter      Breast Cancer No family hx of      Cancer - colorectal No family hx of      Prostate Cancer No family hx of            Reviewed and updated as needed this visit by clinical staff  Tobacco  Allergies  Med Hx  Surg Hx  Fam Hx  Soc Hx      Reviewed and updated as needed this visit by Provider         ROS:  Constitutional, HEENT, cardiovascular, pulmonary, gi and gu systems are negative, except as otherwise noted.    OBJECTIVE:                                                    /81 (BP Location: Right arm, Patient Position: Chair, Cuff Size: Adult Large)  Pulse 75  Temp 97.1  F (36.2  C) (Tympanic)  Resp 16  Ht 5' 11\" (1.803 m)  Wt 266 lb (120.7 kg)  SpO2 97%  BMI " 37.1 kg/m2  Body mass index is 37.1 kg/(m^2).  GENERAL: healthy, alert and no distress  EYES: Eyes grossly normal to inspection, PERRL and conjunctivae and sclerae normal  HENT: ear canals and TM's normal, nose and mouth without ulcers or lesions  NECK: no adenopathy, no asymmetry, masses, or scars and thyroid normal to palpation  RESP: lungs clear to auscultation - no rales, rhonchi or wheezes  CV: regular rates and rhythm, normal S1 S2, no S3 or S4 and no murmur, click or rub  ABDOMEN: soft, nontender, no hepatosplenomegaly, no masses and bowel sounds normal  SKIN: no suspicious lesions or rashes  NEURO: Normal strength and tone, mentation intact and speech normal  BACK: no CVA tenderness, no paralumbar tenderness    Diagnostic Test Results:  EKG - appears normal, NSR, normal axis, normal intervals, no acute ST/T changes c/w ischemia, no LVH by voltage criteria, unchanged from previous tracings     ASSESSMENT/PLAN:                                                        ICD-10-CM    1. Atypical chest pain R07.89 EKG 12-lead complete w/read - Clinics     GASTROENTEROLOGY ADULT REF PROCEDURE ONLY   2. Gastroesophageal reflux disease without esophagitis K21.9 pantoprazole (PROTONIX) 40 MG EC tablet     GASTROENTEROLOGY ADULT REF PROCEDURE ONLY       FURTHER TESTING:       -  Upper Endoscopy, discussed lifestyle changes. 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     Patient Instructions       GERD (Adult)    The esophagus is a tube that carries food from the mouth to the stomach. A valve at the lower end of the esophagus prevents stomach acid from flowing upward. When this valve doesn't work properly, stomach contents may repeatedly flow back up (reflux) into the esophagus. This is called gastroesophageal reflux disease (GERD). GERD can irritate the esophagus. It can cause problems with swallowing or breathing. In severe cases, GERD  "can cause recurrent pneumonia or other serious problems.  Symptoms of reflux include burning, pressure or sharp pain in the upper abdomen or mid to lower chest. The pain can spread to the neck, back, or shoulder. There may be belching, an acid taste in the back of the throat, chronic cough, or sore throat or hoarseness. GERD symptoms often occur during the day after a big meal. They can also occur at night when lying down.   Home care  Lifestyle changes can help reduce symptoms. If needed, medicines may be prescribed. Symptoms often improve with treatment, but if treatment is stopped, the symptoms often return after a few months. So most persons with GERD will need to continue treatment.  Lifestyle changes    Limit or avoid fatty, fried, and spicy foods, as well as coffee, chocolate, mint, and foods with high acid content such as tomatoes and citrus fruit and juices (orange, grapefruit, lemon).    Don t eat large meals, especially at night. Frequent, smaller meals are best. Do not lie down right after eating. And don t eat anything 3 hours before going to bed.    Avoid drinking alcohol and smoking. As much as possible, stay away from second hand smoke.    If you are overweight, losing weight will reduce symptoms.     Avoid wearing tight clothing around your stomach area.    If your symptoms occur during sleep, use a foam wedge to elevate your upper body (not just your head.) Or, place 4\" blocks under the head of your bed.  Medicines  If needed, medicines can help relieve the symptoms of GERD and prevent damage to the esophagus. Discuss a medicine plan with your healthcare provider. This may include one or more of the following medicines:    Antacids to help neutralize the normal acids in your stomach.    Acid blockers (H2 blockers) to decrease acid production.    Acid inhibitors (PPIs) to decrease acid production in a different way than the blockers. They may work better, but can take a little longer to take " effect.  Take an antacid 30-60 minutes after eating and at bedtime, but not at the same time as an acid blocker.  Try not to take medicines such as ibuprofen and aspirin. If you are taking aspirin for your heart or other medical reasons, talk to your healthcare provider about stopping it.  Follow-up care  Follow up with your healthcare provider or as advised by our staff.  When to seek medical advice  Call your healthcare provider if any of the following occur:    Stomach pain gets worse or moves to the lower right abdomen (appendix area)    Chest pain appears or gets worse, or spreads to the back, neck, shoulder, or arm    Frequent vomiting (can t keep down liquids)    Blood in the stool or vomit (red or black in color)    Feeling weak or dizzy    Fever of 100.4 F (38 C) or higher, or as directed by your healthcare provider    1931-8854 [employer]. [address] [place], [place] [address]. All rights reserved. This information is not intended as a substitute for professional medical care. Always follow your healthcare professional's instructions.        Lifestyle Changes for Controlling GERD  When you have GERD, stomach acid feels as if it s backing up toward your mouth. Whether or not you take medication to control your GERD, your symptoms can often be improved with lifestyle changes. Talk to your doctor about the following suggestions, which may help you get relief from your symptoms.  Raise Your Head    Reflux is more likely to strike when you re lying down flat, because stomach fluid can flow backward more easily. Raising the head of your bed 4 to 6 inches can help. To do this:    Slide blocks or books under the legs at the head of your bed. Or, place a wedge under the mattress. [employer] can make a suitable wedge for you. The wedge should run from your waist to the top of your head.    Don t just prop your head on several pillows. This increases pressure on your stomach. It can make GERD  worse.  Watch Your Eating Habits  Certain foods may increase the acid in your stomach or relax the lower esophageal sphincter, making GERD more likely. It s best to avoid the following:    Coffee, tea, and carbonated drinks (with and without caffeine)    Fatty, fried, or spicy food    Mint, chocolate, onions, and tomatoes    Any other foods that seem to irritate your stomach or cause you pain  Relieve the Pressure    Eat smaller meals, even if you have to eat more often.    Don t lie down right after you eat. Wait a few hours for your stomach to empty.    Avoid tight belts and tight-fitting clothes.    Lose excess weight.  Tobacco and Alcohol  Avoid smoking tobacco and drinking alcohol. They can make GERD symptoms worse.    5990-9705 [employer]. [address] [place] [address], [place] [address]. All rights reserved. This information is not intended as a substitute for professional medical care. Always follow your healthcare professional's instructions.        Surgery for GERD (Fundoplication)  You have gastroesophageal reflux disease (GERD). This is a problem where food and fluid flow back (reflux) into your esophagus. Other treatments have not brought relief. Your doctor is now recommending a surgery called fundoplication. Read on to learn more.        The top of the stomach is wrapped around the esophagus.         The wrap is permanently stitched in place. Two commonly used wraps are full and partial.      What the Surgery Does  Your lower esophageal sphincter (LES) is a one-way valve at the top of the stomach. It keeps food and fluid from flowing backward. Your LES is weak. It does not close off the top of the stomach. This allows food and fluid to reflux into the esophagus. During fundoplication, the LES is restructured. This is done by wrapping the very top of the stomach around the lower part of the esophagus.  Two Techniques for Surgery  The surgery is most often done with laparoscopy. But it may also  be done with open surgery.    Laparoscopy: This is surgery through a few small incisions. A thin, lighted tube called a laparoscope is used. The scope allows the doctor to see inside the body and work through the small incisions.    Open surgery: This is surgery through one larger incision. The doctor sees and works through this incision. It may be used if your doctor feels it isn t safe to continue with laparoscopic surgery.  During the Surgery  An intravenous line is put into a vein in your arm or hand. This line gives you fluids and medications. You are then given anesthesia. This is medication to keep you free from pain during surgery. Most often, general anesthesia is used. This puts you into a state like deep sleep during the surgery. Once the surgery begins:    If done by laparoscopy.    The doctor makes 2 to 4 small incisions in the abdomen. The scope is put through one of the incisions. The scope sends live pictures to a video screen. This allows the doctor see inside the abdomen.    Surgical tools are placed through the other small incisions.    Your abdomen is  inflated with carbon dioxide. This gas provides space for the doctor to see and work.    If done by open surgery.    One larger incision will be made that will allow the doctor to see and work through.      The opening in the diaphragm that the esophagus travels through is called the hiatus. If the hiatus is too large, it s called a hiatal hernia. If this is present, the hiatus is tightened with a few stitches.    The stomach is wrapped around the outside of the esophagus. The wrap is stitched into place.    When the surgery is done, all tools are removed. Any incisions are closed with sutures or staples.  Risks and Complications of Fundoplication    Injury to the liver, spleen, esophagus, or stomach    Infection    Increased gas or bloating    Bleeding    Inability to vomit    Trouble swallowing    Failure to eliminate GERD     [id number] The  [employer]. [address] [place] [address], [place] [address]. All rights reserved. This information is not intended as a substitute for professional medical care. Always follow your healthcare professional's instructions.            [facility]

## (undated) RX ORDER — LIDOCAINE HYDROCHLORIDE 10 MG/ML
INJECTION, SOLUTION EPIDURAL; INFILTRATION; INTRACAUDAL; PERINEURAL
Status: DISPENSED
Start: 2018-11-16

## (undated) RX ORDER — PROPOFOL 10 MG/ML
INJECTION, EMULSION INTRAVENOUS
Status: DISPENSED
Start: 2018-11-16

## (undated) RX ORDER — DEXAMETHASONE SODIUM PHOSPHATE 10 MG/ML
INJECTION, SOLUTION INTRAMUSCULAR; INTRAVENOUS
Status: DISPENSED
Start: 2019-06-11

## (undated) RX ORDER — LIDOCAINE HYDROCHLORIDE 20 MG/ML
INJECTION, SOLUTION EPIDURAL; INFILTRATION; INTRACAUDAL; PERINEURAL
Status: DISPENSED
Start: 2019-06-11

## (undated) RX ORDER — GLYCOPYRROLATE 0.2 MG/ML
INJECTION, SOLUTION INTRAMUSCULAR; INTRAVENOUS
Status: DISPENSED
Start: 2018-11-16